# Patient Record
Sex: FEMALE | Race: BLACK OR AFRICAN AMERICAN | NOT HISPANIC OR LATINO | Employment: OTHER | URBAN - METROPOLITAN AREA
[De-identification: names, ages, dates, MRNs, and addresses within clinical notes are randomized per-mention and may not be internally consistent; named-entity substitution may affect disease eponyms.]

---

## 2019-05-15 ENCOUNTER — MEDICAL CORRESPONDENCE (OUTPATIENT)
Dept: HEALTH INFORMATION MANAGEMENT | Facility: CLINIC | Age: 69
End: 2019-05-15

## 2019-05-20 ENCOUNTER — TRANSFERRED RECORDS (OUTPATIENT)
Dept: HEALTH INFORMATION MANAGEMENT | Facility: CLINIC | Age: 69
End: 2019-05-20

## 2019-06-11 NOTE — TELEPHONE ENCOUNTER
RECORDS RECEIVED FROM: Hep C, High viral load, appt per [facility]. records at [facility]   DATE RECEIVED: 06.12.2019   NOTES STATUS DETAILS   OFFICE NOTE from referring provider Received 06.11.2019   OFFICE NOTES from other specialists N/A    DISCHARGE SUMMARY from hospital N/A    MEDICATION LIST N/A    LIVER BIOSPY (IF APPLICABLE)      PATHOLOGY REPORTS  N/A    IMAGING     ENDOSCOPY (IF AVAILABLE) N/A    COLONOSCOPY (IF AVAILABLE) N/A    ULTRASOUND LIVER N/A    CT OF ABDOMEN N/A    MRI OF LIVER N/A    FIBROSCAN, US ELASTOGRAPHY, FIBROSIS SCAN, MR ELASTOGRAPHY N/A    LABS     HEPATIC PANEL (LIVER PANEL) N/A    BASIC METABOLIC PANEL N/A    COMPLETE METABOLIC PANEL N/A    COMPLETE BLOOD COUNT (CBC) Received 05.16.2019   INTERNATIONAL NORMALIZED RATIO (INR) N/A    HEPATITIS C ANTIBODY N/A    HEPATITIS C VIRAL LOAD/PCR N/A    HEPATITIS C GENOTYPE N/A    HEPATITIS B SURFACE ANTIGEN N/A    HEPATITIS B SURFACE ANTIBODY N/A    HEPATITIS B DNA QUANT LEVEL N/A    HEPATITIS B CORE ANTIBODY N/A

## 2019-06-12 ENCOUNTER — PRE VISIT (OUTPATIENT)
Dept: GASTROENTEROLOGY | Facility: CLINIC | Age: 69
End: 2019-06-12

## 2019-06-21 ENCOUNTER — APPOINTMENT (OUTPATIENT)
Dept: GENERAL RADIOLOGY | Facility: CLINIC | Age: 69
End: 2019-06-21
Attending: EMERGENCY MEDICINE
Payer: COMMERCIAL

## 2019-06-21 ENCOUNTER — HOSPITAL ENCOUNTER (EMERGENCY)
Facility: CLINIC | Age: 69
Discharge: HOME | End: 2019-06-21
Attending: EMERGENCY MEDICINE | Admitting: EMERGENCY MEDICINE
Payer: COMMERCIAL

## 2019-06-21 VITALS
DIASTOLIC BLOOD PRESSURE: 62 MMHG | OXYGEN SATURATION: 100 % | HEART RATE: 74 BPM | SYSTOLIC BLOOD PRESSURE: 130 MMHG | RESPIRATION RATE: 16 BRPM | TEMPERATURE: 98 F

## 2019-06-21 DIAGNOSIS — D64.9 ANEMIA, UNSPECIFIED TYPE: ICD-10-CM

## 2019-06-21 DIAGNOSIS — G89.29 OTHER CHRONIC PAIN: ICD-10-CM

## 2019-06-21 DIAGNOSIS — M19.90 OSTEOARTHRITIS, UNSPECIFIED OSTEOARTHRITIS TYPE, UNSPECIFIED SITE: ICD-10-CM

## 2019-06-21 LAB
ALBUMIN UR-MCNC: NEGATIVE MG/DL
ANION GAP SERPL CALCULATED.3IONS-SCNC: 7 MMOL/L (ref 3–14)
APPEARANCE UR: CLEAR
BASOPHILS # BLD AUTO: 0 10E9/L (ref 0–0.2)
BASOPHILS NFR BLD AUTO: 0 %
BILIRUB UR QL STRIP: NEGATIVE
BUN SERPL-MCNC: 22 MG/DL (ref 7–30)
CALCIUM SERPL-MCNC: 8.5 MG/DL (ref 8.5–10.1)
CHLORIDE SERPL-SCNC: 103 MMOL/L (ref 94–109)
CO2 SERPL-SCNC: 27 MMOL/L (ref 20–32)
COLOR UR AUTO: ABNORMAL
CREAT SERPL-MCNC: 0.86 MG/DL (ref 0.52–1.04)
DIFFERENTIAL METHOD BLD: ABNORMAL
EOSINOPHIL # BLD AUTO: 0 10E9/L (ref 0–0.7)
EOSINOPHIL NFR BLD AUTO: 0 %
ERYTHROCYTE [DISTWIDTH] IN BLOOD BY AUTOMATED COUNT: 13 % (ref 10–15)
GFR SERPL CREATININE-BSD FRML MDRD: 68 ML/MIN/{1.73_M2}
GLUCOSE SERPL-MCNC: 84 MG/DL (ref 70–99)
GLUCOSE UR STRIP-MCNC: NEGATIVE MG/DL
HCT VFR BLD AUTO: 32.1 % (ref 35–47)
HGB BLD-MCNC: 10.7 G/DL (ref 11.7–15.7)
HGB UR QL STRIP: NEGATIVE
IMM GRANULOCYTES # BLD: 0 10E9/L (ref 0–0.4)
IMM GRANULOCYTES NFR BLD: 0.4 %
KETONES UR STRIP-MCNC: NEGATIVE MG/DL
LEUKOCYTE ESTERASE UR QL STRIP: ABNORMAL
LYMPHOCYTES # BLD AUTO: 1.7 10E9/L (ref 0.8–5.3)
LYMPHOCYTES NFR BLD AUTO: 35.4 %
MCH RBC QN AUTO: 32.1 PG (ref 26.5–33)
MCHC RBC AUTO-ENTMCNC: 33.3 G/DL (ref 31.5–36.5)
MCV RBC AUTO: 96 FL (ref 78–100)
MONOCYTES # BLD AUTO: 0.4 10E9/L (ref 0–1.3)
MONOCYTES NFR BLD AUTO: 7.8 %
MUCOUS THREADS #/AREA URNS LPF: PRESENT /LPF
NEUTROPHILS # BLD AUTO: 2.7 10E9/L (ref 1.6–8.3)
NEUTROPHILS NFR BLD AUTO: 56.4 %
NITRATE UR QL: NEGATIVE
NRBC # BLD AUTO: 0 10*3/UL
NRBC BLD AUTO-RTO: 0 /100
PH UR STRIP: 6 PH (ref 5–7)
PLATELET # BLD AUTO: 128 10E9/L (ref 150–450)
POTASSIUM SERPL-SCNC: 4.7 MMOL/L (ref 3.4–5.3)
RBC # BLD AUTO: 3.33 10E12/L (ref 3.8–5.2)
RBC #/AREA URNS AUTO: 0 /HPF (ref 0–2)
SODIUM SERPL-SCNC: 137 MMOL/L (ref 133–144)
SOURCE: ABNORMAL
SP GR UR STRIP: 1 (ref 1–1.03)
SQUAMOUS #/AREA URNS AUTO: <1 /HPF (ref 0–1)
UROBILINOGEN UR STRIP-MCNC: NORMAL MG/DL (ref 0–2)
WBC # BLD AUTO: 4.8 10E9/L (ref 4–11)
WBC #/AREA URNS AUTO: 1 /HPF (ref 0–5)

## 2019-06-21 PROCEDURE — 25000132 ZZH RX MED GY IP 250 OP 250 PS 637: Performed by: EMERGENCY MEDICINE

## 2019-06-21 PROCEDURE — 72100 X-RAY EXAM L-S SPINE 2/3 VWS: CPT

## 2019-06-21 PROCEDURE — 72040 X-RAY EXAM NECK SPINE 2-3 VW: CPT

## 2019-06-21 PROCEDURE — 80048 BASIC METABOLIC PNL TOTAL CA: CPT | Performed by: EMERGENCY MEDICINE

## 2019-06-21 PROCEDURE — 81001 URINALYSIS AUTO W/SCOPE: CPT | Performed by: EMERGENCY MEDICINE

## 2019-06-21 PROCEDURE — 73502 X-RAY EXAM HIP UNI 2-3 VIEWS: CPT

## 2019-06-21 PROCEDURE — 73562 X-RAY EXAM OF KNEE 3: CPT | Mod: LT

## 2019-06-21 PROCEDURE — 85025 COMPLETE CBC W/AUTO DIFF WBC: CPT | Performed by: EMERGENCY MEDICINE

## 2019-06-21 PROCEDURE — 99283 EMERGENCY DEPT VISIT LOW MDM: CPT | Mod: Z6 | Performed by: EMERGENCY MEDICINE

## 2019-06-21 PROCEDURE — 99284 EMERGENCY DEPT VISIT MOD MDM: CPT | Performed by: EMERGENCY MEDICINE

## 2019-06-21 RX ORDER — IBUPROFEN 600 MG/1
600 TABLET, FILM COATED ORAL ONCE
Status: COMPLETED | OUTPATIENT
Start: 2019-06-21 | End: 2019-06-21

## 2019-06-21 RX ORDER — IBUPROFEN 200 MG
400 TABLET ORAL EVERY 8 HOURS PRN
Qty: 20 TABLET | Refills: 0 | Status: SHIPPED | OUTPATIENT
Start: 2019-06-21 | End: 2019-07-11

## 2019-06-21 RX ORDER — ACETAMINOPHEN 500 MG
500 TABLET ORAL EVERY 6 HOURS PRN
Qty: 20 TABLET | Refills: 0 | Status: SHIPPED | OUTPATIENT
Start: 2019-06-21 | End: 2019-07-11

## 2019-06-21 RX ADMIN — IBUPROFEN 600 MG: 600 TABLET ORAL at 20:32

## 2019-06-21 SDOH — HEALTH STABILITY: MENTAL HEALTH: HOW OFTEN DO YOU HAVE A DRINK CONTAINING ALCOHOL?: NEVER

## 2019-06-21 ASSESSMENT — ENCOUNTER SYMPTOMS
NECK STIFFNESS: 0
NECK PAIN: 1
WEAKNESS: 0
DYSURIA: 0
NUMBNESS: 1
COUGH: 0
SHORTNESS OF BREATH: 0
BACK PAIN: 1
FACIAL ASYMMETRY: 0
VOMITING: 0
NAUSEA: 0
ARTHRALGIAS: 1
ABDOMINAL PAIN: 0
MYALGIAS: 1
HEADACHES: 0
FEVER: 1
SPEECH DIFFICULTY: 0
WOUND: 0

## 2019-06-21 NOTE — ED NOTES
Patient reports pain throughout body for past couple of months. Was seen in doctors office June 5, says they didn't tell her what was wrong or give her anything to help. Patient voided just prior to coming into ED, asked for urine sample when she is able to provide.

## 2019-06-21 NOTE — ED NOTES
Patient presents to ED with c/o right sided pain and states symptoms started 3 months ago and has gotten worst; patient c/o right toes numbness; refused  services and requested family to serve as ; resting in bed.

## 2019-06-21 NOTE — ED AVS SNAPSHOT
[facility], [place], Emergency Department  [address]  [place] [address]  Phone:  [phone]  Fax:  [phone]                                    Junaid Gunter   MRN: [id number]    Department:  [facility], Emergency Department   Date of Visit:  6/21/2019           After Visit Summary Signature Page    I have received my discharge instructions, and my questions have been answered. I have discussed any challenges I see with this plan with the nurse or doctor.    ..........................................................................................................................................  Patient/Patient Representative Signature      ..........................................................................................................................................  Patient Representative Print Name and Relationship to Patient    ..................................................               ................................................  Date                                   Time    ..........................................................................................................................................  Reviewed by Signature/Title    ...................................................              ..............................................  Date                                               Time          22EPIC Rev 08/18

## 2019-06-22 NOTE — ED PROVIDER NOTES
History     Chief Complaint   Patient presents with     Body pain     Left sided body pain for about three months, getting worse.     Urgency     and burning with urination.     The history is provided by the patient. The history is limited by a language barrier.  used: patient's neighbor acts as Singaporean .     Junaid Gunter is a 69 year old [place]an female with a history of hypertension who presents to the Emergency Department accompanied by her neighbor for evaluation of left-sided pain ongoing for the past three months. Patient reports that her pain initially began at her left hip and has since progressed to include her entire left side from the neck below. Patient reports that her pain has been constant since onset though waxing and waning in severity. Patient describes this as musculoskeletal in nature with no associated injuries or trauma. She notes that her pain is exacerbated with movement. She does use a cane in order to ambulate and notes that she walks with a limp and bears more weight on her right side. Patient states that she has a burning sensation of her right foot on the plantar aspect with associated paresthesia. Additionally, patient reports having subjective fevers since onset and states that this is increasing. She denies any headaches but does endorse neck pain. She denies any vision changes or eye pain, though she does note having some blurred vision. She denies any abdominal pain, chest pain, shortness of breath, nausea or vomiting.    I have reviewed the Medications, Allergies, Past Medical and Surgical History, and Social History in the [facility] system.    Past Medical History:   Diagnosis Date     Hypertension        History reviewed. No pertinent surgical history.    History reviewed. No pertinent family history.    Social History     Tobacco Use     Smoking status: Never Smoker     Smokeless tobacco: Never Used   Substance Use Topics     Alcohol use: Never      Frequency: Never     No current facility-administered medications for this encounter.      Current Outpatient Medications   Medication     acetaminophen (TYLENOL) 500 MG tablet     ibuprofen (ADVIL/MOTRIN) 200 MG tablet        Allergies   Allergen Reactions     Egg [Chicken-Derived Products (Egg)] Itching       Review of Systems   Constitutional: Positive for fever (subjective).   Eyes: Negative for visual disturbance.   Respiratory: Negative for cough and shortness of breath.    Cardiovascular: Negative for chest pain and leg swelling.   Gastrointestinal: Negative for abdominal pain, nausea and vomiting.   Genitourinary: Negative for dysuria.   Musculoskeletal: Positive for arthralgias, back pain, gait problem (limps), myalgias (left-sided from the neck below) and neck pain (left-sided). Negative for neck stiffness.   Skin: Negative for rash and wound.   Neurological: Positive for numbness (right toes). Negative for facial asymmetry, speech difficulty, weakness and headaches.   All other systems reviewed and are negative.      Physical Exam   BP: 148/60  Pulse: 74  Heart Rate: 74  Temp: 96.4  F (35.8  C)  Resp: 16  SpO2: 97 %      Physical Exam   Constitutional: She is oriented to person, place, and time. She appears well-developed and well-nourished. No distress.   HENT:   Head: Normocephalic and atraumatic.   Nose: Nose normal.   Mouth/Throat: Oropharynx is clear and moist.   Eyes: Pupils are equal, round, and reactive to light. Conjunctivae and EOM are normal. No scleral icterus.   Neck: Trachea normal, normal range of motion, full passive range of motion without pain and phonation normal. Neck supple. Muscular tenderness present. No spinous process tenderness present. No neck rigidity. No edema, no erythema and normal range of motion present. No thyroid mass present.       Cardiovascular: Normal rate, regular rhythm, normal heart sounds and intact distal pulses.   No murmur heard.  Pulmonary/Chest: Effort  normal and breath sounds normal. No stridor. No respiratory distress. She has no wheezes. She has no rales. She exhibits no tenderness.   Abdominal: Soft. Bowel sounds are normal. She exhibits no distension and no mass. There is no tenderness. There is no rebound and no guarding.   Musculoskeletal: She exhibits tenderness. She exhibits no edema or deformity.        Left shoulder: Normal.        Left elbow: Normal.        Left hip: She exhibits tenderness. She exhibits normal range of motion, no bony tenderness, no swelling, no crepitus and no deformity.        Cervical back: Normal.        Thoracic back: Normal.        Lumbar back: She exhibits tenderness and pain. She exhibits normal range of motion, no bony tenderness, no swelling, no edema and no deformity.        Back:         Right upper arm: Normal.        Left upper leg: She exhibits tenderness. She exhibits no bony tenderness, no edema and no deformity.        Left lower leg: Normal.        Legs:       Right foot: Normal. There is normal range of motion, no tenderness, no bony tenderness, no swelling and no deformity.        Left foot: Normal.   Neurological: She is alert and oriented to person, place, and time. She has normal strength. She displays no tremor. No cranial nerve deficit or sensory deficit. She exhibits normal muscle tone. Gait abnormal. Coordination normal. GCS eye subscore is 4. GCS verbal subscore is 5. GCS motor subscore is 6.   Gait is slightly antalgic with subtle tilt to right side   Skin: Skin is warm and dry. No rash noted. She is not diaphoretic. No erythema.   Psychiatric: She has a normal mood and affect. Her behavior is normal. Thought content normal.   Nursing note and vitals reviewed.      ED Course        Procedures             Critical Care time:  none             Labs Ordered and Resulted from Time of ED Arrival Up to the Time of Departure from the ED   CBC WITH PLATELETS DIFFERENTIAL - Abnormal; Notable for the following  components:       Result Value    RBC Count 3.33 (*)     Hemoglobin 10.7 (*)     Hematocrit 32.1 (*)     Platelet Count 128 (*)     All other components within normal limits   ROUTINE UA WITH MICROSCOPIC REFLEX TO CULTURE - Abnormal; Notable for the following components:    Leukocyte Esterase Urine Small (*)     Mucous Urine Present (*)     All other components within normal limits   BASIC METABOLIC PANEL           XR Pelvis and Hip Left 2 Views   Final Result   IMPRESSION: AP view of pelvis. Pelvic ring is intact without fracture.   Hips are located without evidence of fracture. SI joints appear   symmetric. Lateral view left hip is unremarkable. Multiple injection   calcifications are noted in the right and left buttock region.       CADY AGARWAL MD      XR Lumbar Spine 2/3 Views   Final Result   IMPRESSION:  Two views of the lumbar spine are performed. There is   grade 2 spondylolisthesis of L5 on S1 probably related to significant   degenerative facet changes at that level. Lumbar spine is otherwise   normal in alignment. No fracture is appreciated. Aortic calcification   is noted. No obvious evidence of aneurysm.       CADY AGARWAL MD      XR Knee Left 3 Views   Final Result   IMPRESSION: Three views of the left knee are performed. No fracture or   dislocation is evident. Three compartment degenerative changes are   present most significant in the medial joint compartment with medial   joint line osteophytes and joint space narrowing. Mild joint space   narrowing patellofemoral and lateral joint compartment are also noted   without significant osteophytes. No suprapatellar effusion is evident.         CADY AGARWAL MD      XR Cervical Spine 2/3 Views   Final Result   IMPRESSION:  Three views of the cervical spine are performed. No   evidence of fracture or malalignment. Degenerative disc changes are   noted at C3-4, C4-5 and C5-6. Degenerative facet changes are also   noted at these levels. Odontoid view is  unremarkable.       CADY AGARWAL MD            Assessments & Plan (with Medical Decision Making)   Junaid Gunter is a 69 year old female with a history of hypertension who presents to the Emergency Department accompanied by her neighbor for evaluation of left-sided pain ongoing for the past three months.    Ddx: myalgias, arthralgias, DJD/OA of left hip or knee, antalgic gait    Patient NAD with diffuse muscle tenderness on left upper and lower body. No bony deformity. No strength or sensation deficits on exam. Walks with slightly antalgic gait, leaning to right side a bit. No ataxia or weakness. Suspect patient has DJD of left hip or knee causing gait abnormality with myalgias from asymmetric gait. Would likely benefit from PT and NSAIDs for pain. Will obtain screening labs: CBC, BMP, UA. Xrays of C and L spine, pelvis, left hip, left knee. No e/o stroke with absence of objective neuro deficits on exam. No joint swelling or warmth to suggest septic or inflammatory arthropathy. Patient walks with cane at baseline. Given motrin for pain.     Pain resolved with motrin. Xrays, as above, with diffuse DJD. Discussed f/u with PCP for referral to PCP and w/u of anemia. Prescribed tylenol and motrin for chronic MSK pain. Return precautions provided.       I have reviewed the nursing notes.    I have reviewed the findings, diagnosis, plan and need for follow up with the patient.       Medication List      Started    acetaminophen 500 MG tablet  Commonly known as:  TYLENOL  500 mg, Oral, EVERY 6 HOURS PRN     ibuprofen 200 MG tablet  Commonly known as:  ADVIL/MOTRIN  400 mg, Oral, EVERY 8 HOURS PRN            Final diagnoses:   Osteoarthritis, unspecified osteoarthritis type, unspecified site   Anemia, unspecified type   Other chronic pain     Sonya BRICENO, am serving as a trained medical scribe to document services personally performed by Dominique Benton MD, based on the provider's statements to me.   IDominique MD,  was physically present and have reviewed and verified the accuracy of this note documented by Sonya Gunter.    6/21/2019   [facility], [place], EMERGENCY DEPARTMENT     Dominique Benton MD  06/22/19 0132

## 2019-08-30 ENCOUNTER — TELEPHONE (OUTPATIENT)
Dept: GASTROENTEROLOGY | Facility: CLINIC | Age: 69
End: 2019-08-30

## 2019-09-03 ENCOUNTER — OFFICE VISIT (OUTPATIENT)
Dept: GASTROENTEROLOGY | Facility: CLINIC | Age: 69
End: 2019-09-03
Attending: INTERNAL MEDICINE
Payer: COMMERCIAL

## 2019-09-03 VITALS
HEART RATE: 73 BPM | OXYGEN SATURATION: 98 % | DIASTOLIC BLOOD PRESSURE: 72 MMHG | TEMPERATURE: 97.8 F | WEIGHT: 158.7 LBS | SYSTOLIC BLOOD PRESSURE: 126 MMHG

## 2019-09-03 DIAGNOSIS — B18.2 CHRONIC HEPATITIS C WITHOUT HEPATIC COMA (H): Primary | ICD-10-CM

## 2019-09-03 DIAGNOSIS — B18.2 CHRONIC HEPATITIS C WITHOUT HEPATIC COMA (H): ICD-10-CM

## 2019-09-03 LAB
ALBUMIN SERPL-MCNC: 3.6 G/DL (ref 3.4–5)
ALP SERPL-CCNC: 85 U/L (ref 40–150)
ALT SERPL W P-5'-P-CCNC: 46 U/L (ref 0–50)
ANION GAP SERPL CALCULATED.3IONS-SCNC: 5 MMOL/L (ref 3–14)
AST SERPL W P-5'-P-CCNC: 44 U/L (ref 0–45)
BILIRUB SERPL-MCNC: 0.3 MG/DL (ref 0.2–1.3)
BUN SERPL-MCNC: 27 MG/DL (ref 7–30)
CALCIUM SERPL-MCNC: 9.2 MG/DL (ref 8.5–10.1)
CHLORIDE SERPL-SCNC: 99 MMOL/L (ref 94–109)
CO2 SERPL-SCNC: 28 MMOL/L (ref 20–32)
CREAT SERPL-MCNC: 0.88 MG/DL (ref 0.52–1.04)
ERYTHROCYTE [DISTWIDTH] IN BLOOD BY AUTOMATED COUNT: 12.9 % (ref 10–15)
GFR SERPL CREATININE-BSD FRML MDRD: 67 ML/MIN/{1.73_M2}
GLUCOSE SERPL-MCNC: 71 MG/DL (ref 70–99)
HBV SURFACE AB SERPL IA-ACNC: 0.88 M[IU]/ML
HBV SURFACE AG SERPL QL IA: NONREACTIVE
HCT VFR BLD AUTO: 31.8 % (ref 35–47)
HCV AB SERPL QL IA: REACTIVE
HCV AB SERPL QL IA: REACTIVE
HGB BLD-MCNC: 10.3 G/DL (ref 11.7–15.7)
MCH RBC QN AUTO: 32.3 PG (ref 26.5–33)
MCHC RBC AUTO-ENTMCNC: 32.4 G/DL (ref 31.5–36.5)
MCV RBC AUTO: 100 FL (ref 78–100)
PLATELET # BLD AUTO: 109 10E9/L (ref 150–450)
POTASSIUM SERPL-SCNC: 3.7 MMOL/L (ref 3.4–5.3)
PROT SERPL-MCNC: 8.1 G/DL (ref 6.8–8.8)
RBC # BLD AUTO: 3.19 10E12/L (ref 3.8–5.2)
SODIUM SERPL-SCNC: 132 MMOL/L (ref 133–144)
WBC # BLD AUTO: 5.2 10E9/L (ref 4–11)

## 2019-09-03 PROCEDURE — 87389 HIV-1 AG W/HIV-1&-2 AB AG IA: CPT | Performed by: INTERNAL MEDICINE

## 2019-09-03 PROCEDURE — 87340 HEPATITIS B SURFACE AG IA: CPT | Performed by: INTERNAL MEDICINE

## 2019-09-03 PROCEDURE — 36415 COLL VENOUS BLD VENIPUNCTURE: CPT | Performed by: INTERNAL MEDICINE

## 2019-09-03 PROCEDURE — 87522 HEPATITIS C REVRS TRNSCRPJ: CPT | Performed by: INTERNAL MEDICINE

## 2019-09-03 PROCEDURE — 86706 HEP B SURFACE ANTIBODY: CPT | Performed by: INTERNAL MEDICINE

## 2019-09-03 PROCEDURE — 80053 COMPREHEN METABOLIC PANEL: CPT | Performed by: INTERNAL MEDICINE

## 2019-09-03 PROCEDURE — 85027 COMPLETE CBC AUTOMATED: CPT | Performed by: INTERNAL MEDICINE

## 2019-09-03 PROCEDURE — G0463 HOSPITAL OUTPT CLINIC VISIT: HCPCS | Mod: ZF

## 2019-09-03 PROCEDURE — 86705 HEP B CORE ANTIBODY IGM: CPT | Performed by: INTERNAL MEDICINE

## 2019-09-03 PROCEDURE — 86803 HEPATITIS C AB TEST: CPT | Performed by: INTERNAL MEDICINE

## 2019-09-03 PROCEDURE — 91200 LIVER ELASTOGRAPHY: CPT | Mod: ZF

## 2019-09-03 PROCEDURE — 86704 HEP B CORE ANTIBODY TOTAL: CPT | Performed by: INTERNAL MEDICINE

## 2019-09-03 PROCEDURE — 87902 NFCT AGT GNTYP ALYS HEP C: CPT | Performed by: INTERNAL MEDICINE

## 2019-09-03 ASSESSMENT — PAIN SCALES - GENERAL: PAINLEVEL: MODERATE PAIN (5)

## 2019-09-03 NOTE — NURSING NOTE
Chief Complaint   Patient presents with     Consult     new; HEP C     /72 (BP Location: Right arm, Patient Position: Sitting, Cuff Size: Adult Regular)   Pulse 73   Temp 97.8  F (36.6  C)   Wt 72 kg (158 lb 11.2 oz)   SpO2 98%       Jefry Eduardo, EMT

## 2019-09-03 NOTE — LETTER
September 5, 2019       TO: Junaid KOLB Darrylmaura  [address]  [place] [address]       Dear Ms. Jani,    We are writing to inform you of your test results.  Your hepatitis C is positive. Will talk to you about management in your next visit.     Resulted Orders   Hepatitis C Screen Reflex to HCV RNA Quant and Genotype   Result Value Ref Range    Hepatitis C Antibody Reactive (AA) NR^Nonreactive      Comment:      A reactive result indicates one of the following 1) current HCV infection 2)   past HCV infection that has resolved or 3) false positivity. The CDC   recommends that a reactive result should be followed by Nucleic acid testing   for HCV RNA.   If HCV RNA is detected, that indicates current HCV infection. If HCV RNA is   not detected, that indicates either past, resolved HCV infection, or false HCV   antibody positivity.  Assay performance characteristics have not been established for newborns,   infants, and children             It was a pleasure to see you at your recent visit. Please let me know if you have any questions or concerns.     Clinic Staff - [phone] option 3     Sincerely,     Jani Nino MD  [address] [facility], [id number]  [place]  [address].

## 2019-09-03 NOTE — PROGRESS NOTES
[facility]    Hepatology New Patient Visit    Referring provider:  Todd Han    CHIEF COMPLAINT AND REASON FOR VISIT:  Ms. Gunter is a 69-year-old [place] immigrant who carries a diagnosis of hepatitis C virus infection.  Although patient claims that I have seen her in the past,  I do not have the details of her diagnosis.        She tells me now that she never had jaundice, does not have any signs of chronic liver disease, abdominal distension, lower extremity edema, lethargy or confusion. No history of melena, hematemesis or hematochezia. Her weight has not changed a lot and her appetite is good.  Denies also any abdominal pain, nausea or vomiting.  She is having regular bowel movements also.  She is physically active, although she is limited by the degenerative joint disease.  Otherwise, her only 2 medications are for pain and these are ibuprofen and Tylenol.     Medical hx Surgical hx   Past Medical History:   Diagnosis Date     DJD (degenerative joint disease)      HCV (hepatitis C virus)      Hypertension      Osteoporosis       No past surgical history on file.       Medications  Prior to Admission medications    Medication Sig Start Date End Date Taking? Authorizing Provider   acetaminophen (TYLENOL) 500 MG tablet Take 1 tablet (500 mg) by mouth every 6 hours as needed for pain 6/21/19 7/11/19  Dominique Benton MD   ibuprofen (ADVIL/MOTRIN) 200 MG tablet Take 2 tablets (400 mg) by mouth every 8 hours as needed (moderate or severe pain) 6/21/19 7/11/19  Dominique Benton MD       Allergies  Allergies   Allergen Reactions     Egg [Chicken-Derived Products (Egg)] Itching       Family hx Social hx   Family History   Problem Relation Age of Onset     Depression Sister       Social History     Tobacco Use     Smoking status: Never Smoker     Smokeless tobacco: Never Used   Substance Use Topics     Alcohol use: Never     Frequency: Never     Drug use: Never          REVIEW OF  SYSTEMS:  She denies any infections, admitted to some fatigue and occasional headaches which required at one point a hospital ER visit.  Denies any seizures.  Denies any cough, shortness of breath or chest pain.  She has some anemia, but no easy bruising.  Denies any diabetes or thyroid issues.  Does not have any psychiatric diagnosis.  She has problems with eye vision, but no hearing problems.  She has significant degenerative joint disease, but no other skin issues.  Otherwise, a comprehensive review of systems was noncontributory if not stated above.     Examination  /72 (BP Location: Right arm, Patient Position: Sitting, Cuff Size: Adult Regular)   Pulse 73   Temp 97.8  F (36.6  C)   Wt 72 kg (158 lb 11.2 oz)   SpO2 98%   There is no height or weight on file to calculate BMI.    Gen- well, NAD, A+Ox3, normal color  Eye- EOMI  ENT- MMM, normal oropharynx  Lym- no palpable lymphadenopathy  CVS- S1, S2 normal, no added sounds, RRR  RS- CTA  Abd- Obese> no hepatosplenomegaly.  Extr- pulses good, no KELLY  MS- hands normal- no clubbing  Neuro- A+Ox3, no asterixis  Skin- no rash or jaundice  Psych- normal mood    Laboratory  Lab Results   Component Value Date     09/03/2019    POTASSIUM 3.7 09/03/2019    CHLORIDE 99 09/03/2019    CO2 28 09/03/2019    BUN 27 09/03/2019    CR 0.88 09/03/2019       Lab Results   Component Value Date    BILITOTAL 0.3 09/03/2019    ALT 46 09/03/2019    AST 44 09/03/2019    ALKPHOS 85 09/03/2019       Lab Results   Component Value Date    ALBUMIN 3.6 09/03/2019    PROTTOTAL 8.1 09/03/2019        Lab Results   Component Value Date    WBC 5.2 09/03/2019    HGB 10.3 09/03/2019     09/03/2019     09/03/2019       No results found for: INR      Radiology    ASSESSMENT AND PLAN:  Hepatitis C virus infection.  Ms. Gunter comes to me with the diagnosis of hepatitis C virus infection.  She claims that I have seen her before, although I do not have the details of that  visit.  She has been in [place] for the last 5 years now, and she is just coming back.  The plan will be first of all to clarify if she has hepatitis C.  We will get a hepatitis C antibody with reflex to HCV RNA and genotype, and at the same time we will get markers for hepatitis B virus infection.  Please note that this is necessary in case the patient proves to have hepatitis C and requires hep C treatment.  There are cases of reactivation, and there is also a question if she had hepatitis B state of disease.  We will get that, and she already had this morning a fibrosis scan, which showed no fibrosis at all, as her mean liver stiffness was 5.6 kPa, which means F0, F1.  She will need an abdominal ultrasound, also, and that will be ordered after we know if she has hep B versus hep C.      This was a 45 minute visit, of which more than 50% was spent in explaining to the patient what our plan of care was.  We answered all of her questions.  She will have her healthcare maintenance issues with him (Dr. Han?), also, including need for a colonoscopy.      cc:   Todd Han MD   [address] [address]     Jani Nino MD  Hepatology  [facility]

## 2019-09-03 NOTE — LETTER
9/3/2019      RE: Junaid Gunter  [address]  [facility] [address]       [facility]    Hepatology New Patient Visit    Referring provider:  Todd Han    CHIEF COMPLAINT AND REASON FOR VISIT:  Ms. Gunter is a 69-year-old Pakistani immigrant who carries a diagnosis of hepatitis C virus infection.  Although patient claims that I have seen her in the past,  I do not have the details of her diagnosis.        She tells me now that she never had jaundice, does not have any signs of chronic liver disease, abdominal distension, lower extremity edema, lethargy or confusion. No history of melena, hematemesis or hematochezia. Her weight has not changed a lot and her appetite is good.  Denies also any abdominal pain, nausea or vomiting.  She is having regular bowel movements also.  She is physically active, although she is limited by the degenerative joint disease.  Otherwise, her only 2 medications are for pain and these are ibuprofen and Tylenol.     Medical hx Surgical hx   Past Medical History:   Diagnosis Date     DJD (degenerative joint disease)      HCV (hepatitis C virus)      Hypertension      Osteoporosis       No past surgical history on file.       Medications  Prior to Admission medications    Medication Sig Start Date End Date Taking? Authorizing Provider   acetaminophen (TYLENOL) 500 MG tablet Take 1 tablet (500 mg) by mouth every 6 hours as needed for pain 6/21/19 7/11/19  Dominique Benton MD   ibuprofen (ADVIL/MOTRIN) 200 MG tablet Take 2 tablets (400 mg) by mouth every 8 hours as needed (moderate or severe pain) 6/21/19 7/11/19  Dominique Benton MD       Allergies  Allergies   Allergen Reactions     Egg [Chicken-Derived Products (Egg)] Itching       Family hx Social hx   Family History   Problem Relation Age of Onset     Depression Sister       Social History     Tobacco Use     Smoking status: Never Smoker     Smokeless tobacco: Never Used   Substance Use Topics      Alcohol use: Never     Frequency: Never     Drug use: Never          REVIEW OF SYSTEMS:  She denies any infections, admitted to some fatigue and occasional headaches which required at one point a hospital ER visit.  Denies any seizures.  Denies any cough, shortness of breath or chest pain.  She has some anemia, but no easy bruising.  Denies any diabetes or thyroid issues.  Does not have any psychiatric diagnosis.  She has problems with eye vision, but no hearing problems.  She has significant degenerative joint disease, but no other skin issues.  Otherwise, a comprehensive review of systems was noncontributory if not stated above.     Examination  /72 (BP Location: Right arm, Patient Position: Sitting, Cuff Size: Adult Regular)   Pulse 73   Temp 97.8  F (36.6  C)   Wt 72 kg (158 lb 11.2 oz)   SpO2 98%   There is no height or weight on file to calculate BMI.    Gen- well, NAD, A+Ox3, normal color  Eye- EOMI  ENT- MMM, normal oropharynx  Lym- no palpable lymphadenopathy  CVS- S1, S2 normal, no added sounds, RRR  RS- CTA  Abd- Obese> no hepatosplenomegaly.  Extr- pulses good, no KELLY  MS- hands normal- no clubbing  Neuro- A+Ox3, no asterixis  Skin- no rash or jaundice  Psych- normal mood    Laboratory  Lab Results   Component Value Date     09/03/2019    POTASSIUM 3.7 09/03/2019    CHLORIDE 99 09/03/2019    CO2 28 09/03/2019    BUN 27 09/03/2019    CR 0.88 09/03/2019       Lab Results   Component Value Date    BILITOTAL 0.3 09/03/2019    ALT 46 09/03/2019    AST 44 09/03/2019    ALKPHOS 85 09/03/2019       Lab Results   Component Value Date    ALBUMIN 3.6 09/03/2019    PROTTOTAL 8.1 09/03/2019        Lab Results   Component Value Date    WBC 5.2 09/03/2019    HGB 10.3 09/03/2019     09/03/2019     09/03/2019       No results found for: INR      Radiology    ASSESSMENT AND PLAN:  Hepatitis C virus infection.  Ms. Gunter comes to me with the diagnosis of hepatitis C virus infection.  She  claims that I have seen her before, although I do not have the details of that visit.  She has been in [place] for the last 5 years now, and she is just coming back.  The plan will be first of all to clarify if she has hepatitis C.  We will get a hepatitis C antibody with reflex to HCV RNA and genotype, and at the same time we will get markers for hepatitis B virus infection.  Please note that this is necessary in case the patient proves to have hepatitis C and requires hep C treatment.  There are cases of reactivation, and there is also a question if she had hepatitis B state of disease.  We will get that, and she already had this morning a fibrosis scan, which showed no fibrosis at all, as her mean liver stiffness was 5.6 kPa, which means F0, F1.  She will need an abdominal ultrasound, also, and that will be ordered after we know if she has hep B versus hep C.      This was a 45 minute visit, of which more than 50% was spent in explaining to the patient what our plan of care was.  We answered all of her questions.  She will have her healthcare maintenance issues with him (Dr. Han?), also, including need for a colonoscopy.     Jani Nino MD    cc:   Todd Han MD   [address] [address]     Jani Nino MD  Hepatology  [facility]

## 2019-09-03 NOTE — LETTER
9/3/2019       RE: uJnaid Gunter  [address]  [facility] [address]     Dear Colleague,    Thank you for referring your patient, Junaid Gunter, to the [facility] HEPATOLOGY at [facility]. Please see a copy of my visit note below.    [facility]    Hepatology New Patient Visit    Referring provider:  Todd Han    CHIEF COMPLAINT AND REASON FOR VISIT:  Ms. Gunter is a 69-year-old [place] immigrant who carries a diagnosis of hepatitis C virus infection.  Although patient claims that I have seen her in the past,  I do not have the details of her diagnosis.        She tells me now that she never had jaundice, does not have any signs of chronic liver disease, abdominal distension, lower extremity edema, lethargy or confusion. No history of melena, hematemesis or hematochezia. Her weight has not changed a lot and her appetite is good.  Denies also any abdominal pain, nausea or vomiting.  She is having regular bowel movements also.  She is physically active, although she is limited by the degenerative joint disease.  Otherwise, her only 2 medications are for pain and these are ibuprofen and Tylenol.     Medical hx Surgical hx   Past Medical History:   Diagnosis Date     DJD (degenerative joint disease)      HCV (hepatitis C virus)      Hypertension      Osteoporosis       No past surgical history on file.       Medications  Prior to Admission medications    Medication Sig Start Date End Date Taking? Authorizing Provider   acetaminophen (TYLENOL) 500 MG tablet Take 1 tablet (500 mg) by mouth every 6 hours as needed for pain 6/21/19 7/11/19  Dominique Benton MD   ibuprofen (ADVIL/MOTRIN) 200 MG tablet Take 2 tablets (400 mg) by mouth every 8 hours as needed (moderate or severe pain) 6/21/19 7/11/19  Dominique Benton MD       Allergies  Allergies   Allergen Reactions     Egg [Chicken-Derived Products (Egg)] Itching       Family hx Social hx    Family History   Problem Relation Age of Onset     Depression Sister       Social History     Tobacco Use     Smoking status: Never Smoker     Smokeless tobacco: Never Used   Substance Use Topics     Alcohol use: Never     Frequency: Never     Drug use: Never          REVIEW OF SYSTEMS:  She denies any infections, admitted to some fatigue and occasional headaches which required at one point a hospital ER visit.  Denies any seizures.  Denies any cough, shortness of breath or chest pain.  She has some anemia, but no easy bruising.  Denies any diabetes or thyroid issues.  Does not have any psychiatric diagnosis.  She has problems with eye vision, but no hearing problems.  She has significant degenerative joint disease, but no other skin issues.  Otherwise, a comprehensive review of systems was noncontributory if not stated above.     Examination  /72 (BP Location: Right arm, Patient Position: Sitting, Cuff Size: Adult Regular)   Pulse 73   Temp 97.8  F (36.6  C)   Wt 72 kg (158 lb 11.2 oz)   SpO2 98%   There is no height or weight on file to calculate BMI.    Gen- well, NAD, A+Ox3, normal color  Eye- EOMI  ENT- MMM, normal oropharynx  Lym- no palpable lymphadenopathy  CVS- S1, S2 normal, no added sounds, RRR  RS- CTA  Abd- Obese> no hepatosplenomegaly.  Extr- pulses good, no KELLY  MS- hands normal- no clubbing  Neuro- A+Ox3, no asterixis  Skin- no rash or jaundice  Psych- normal mood    Laboratory  Lab Results   Component Value Date     09/03/2019    POTASSIUM 3.7 09/03/2019    CHLORIDE 99 09/03/2019    CO2 28 09/03/2019    BUN 27 09/03/2019    CR 0.88 09/03/2019       Lab Results   Component Value Date    BILITOTAL 0.3 09/03/2019    ALT 46 09/03/2019    AST 44 09/03/2019    ALKPHOS 85 09/03/2019       Lab Results   Component Value Date    ALBUMIN 3.6 09/03/2019    PROTTOTAL 8.1 09/03/2019        Lab Results   Component Value Date    WBC 5.2 09/03/2019    HGB 10.3 09/03/2019     09/03/2019      09/03/2019       No results found for: INR      Radiology    ASSESSMENT AND PLAN:  Hepatitis C virus infection.  Ms. Gunter comes to me with the diagnosis of hepatitis C virus infection.  She claims that I have seen her before, although I do not have the details of that visit.  She has been in [place] for the last 5 years now, and she is just coming back.  The plan will be first of all to clarify if she has hepatitis C.  We will get a hepatitis C antibody with reflex to HCV RNA and genotype, and at the same time we will get markers for hepatitis B virus infection.  Please note that this is necessary in case the patient proves to have hepatitis C and requires hep C treatment.  There are cases of reactivation, and there is also a question if she had hepatitis B state of disease.  We will get that, and she already had this morning a fibrosis scan, which showed no fibrosis at all, as her mean liver stiffness was 5.6 kPa, which means F0, F1.  She will need an abdominal ultrasound, also, and that will be ordered after we know if she has hep B versus hep C.      This was a 45 minute visit, of which more than 50% was spent in explaining to the patient what our plan of care was.  We answered all of her questions.  She will have her healthcare maintenance issues with him (Dr. Han?), also, including need for a colonoscopy.        Again, thank you for allowing me to participate in the care of your patient.      Sincerely,    Jani Nino MD    cc:   Todd Han MD   [address] [address]

## 2019-09-05 DIAGNOSIS — B18.2 CHRONIC HEPATITIS C WITHOUT HEPATIC COMA (H): Primary | ICD-10-CM

## 2019-09-05 LAB
HBV CORE AB SERPL QL IA: REACTIVE
HBV CORE IGM SERPL QL IA: NONREACTIVE
HCV RNA SERPL NAA+PROBE-ACNC: ABNORMAL [IU]/ML
HCV RNA SERPL NAA+PROBE-LOG IU: 7.2 LOG IU/ML

## 2019-09-06 DIAGNOSIS — B18.2 CHRONIC HEPATITIS C WITHOUT HEPATIC COMA (H): ICD-10-CM

## 2019-09-06 LAB — HIV 1+2 AB+HIV1 P24 AG SERPL QL IA: NONREACTIVE

## 2019-09-09 ENCOUNTER — PATIENT OUTREACH (OUTPATIENT)
Dept: GASTROENTEROLOGY | Facility: CLINIC | Age: 69
End: 2019-09-09

## 2019-09-09 NOTE — PROGRESS NOTES
Attempted to reach patient to discuss plan of care and hepatitis C treatment with Mavyret x 8 weeks, no answer, message left requesting call back, number provided.

## 2019-09-10 LAB — HCV GENTYP SERPL NAA+PROBE: NORMAL

## 2019-10-18 NOTE — PROGRESS NOTES
2nd attempt made to connect with pt to discuss approval of Mavyret and delivery of medication, no answer, message left requesting call back, number provided. Also called pt's emergency contact, Abhermann (son), to request Abee give pt message to call writer. Spencer asked if writer had called [phone] which writer had. Spoke with Baron Lovelace, [facility] Advocate, who stated that he also has not been able to connect with pt. Will attempt to reach pt again in 2 weeks.

## 2019-11-07 NOTE — PROGRESS NOTES
[facility] unable to reach pt to arrange initial delivery of Mavyret. Third attempt made to reach patient, no answer, message left instructing pt to call the clinic, number provided.

## 2020-02-04 DIAGNOSIS — B18.2 CHRONIC HEPATITIS C WITHOUT HEPATIC COMA (H): Primary | ICD-10-CM

## 2020-02-18 ENCOUNTER — PRE VISIT (OUTPATIENT)
Dept: GASTROENTEROLOGY | Facility: CLINIC | Age: 70
End: 2020-02-18

## 2020-02-18 NOTE — PROGRESS NOTES
Was the patient contacted by phone and reminded of the upcoming visit? Yes via Indian      Was the patient instructed to bring a current list of all medications to the appointment or instructed to bring in all medication bottles? Yes, patient verbalized understanding    Was the patient instructed to arrive prior to the appointment time to have ordered labs drawn? Yes, patient verbalized understanding    Were the needed lab orders placed? Yes    Was lab appointment made 1 hour or more prior to visit? Yes    Patient instructed to arrive early for check-in    Elham Givens CMA [facility]  2/18/2020 4:12 PM

## 2020-02-19 ENCOUNTER — DOCUMENTATION ONLY (OUTPATIENT)
Dept: CARE COORDINATION | Facility: CLINIC | Age: 70
End: 2020-02-19

## 2020-03-10 ENCOUNTER — OFFICE VISIT (OUTPATIENT)
Dept: FAMILY MEDICINE | Facility: CLINIC | Age: 70
End: 2020-03-10
Payer: COMMERCIAL

## 2020-03-10 VITALS
HEIGHT: 59 IN | OXYGEN SATURATION: 100 % | WEIGHT: 163 LBS | HEART RATE: 87 BPM | DIASTOLIC BLOOD PRESSURE: 64 MMHG | BODY MASS INDEX: 32.86 KG/M2 | RESPIRATION RATE: 16 BRPM | SYSTOLIC BLOOD PRESSURE: 110 MMHG | TEMPERATURE: 98.4 F

## 2020-03-10 DIAGNOSIS — I10 ESSENTIAL HYPERTENSION: ICD-10-CM

## 2020-03-10 DIAGNOSIS — M54.2 NECK PAIN: ICD-10-CM

## 2020-03-10 DIAGNOSIS — R06.09 EXERTIONAL DYSPNEA: ICD-10-CM

## 2020-03-10 DIAGNOSIS — E03.9 HYPOTHYROIDISM, UNSPECIFIED TYPE: ICD-10-CM

## 2020-03-10 DIAGNOSIS — Z23 NEED FOR VACCINATION: ICD-10-CM

## 2020-03-10 DIAGNOSIS — M85.80 AGE-RELATED BONE LOSS: ICD-10-CM

## 2020-03-10 DIAGNOSIS — R52 PAIN OF LEFT SIDE OF BODY: ICD-10-CM

## 2020-03-10 DIAGNOSIS — Z13.6 ENCOUNTER FOR LIPID SCREENING FOR CARDIOVASCULAR DISEASE: ICD-10-CM

## 2020-03-10 DIAGNOSIS — I70.0 ATHEROSCLEROSIS OF AORTA (H): ICD-10-CM

## 2020-03-10 DIAGNOSIS — Z78.0 MENOPAUSE: ICD-10-CM

## 2020-03-10 DIAGNOSIS — K21.9 GASTROESOPHAGEAL REFLUX DISEASE, ESOPHAGITIS PRESENCE NOT SPECIFIED: ICD-10-CM

## 2020-03-10 DIAGNOSIS — Z12.11 SPECIAL SCREENING FOR MALIGNANT NEOPLASMS, COLON: ICD-10-CM

## 2020-03-10 DIAGNOSIS — D64.9 ANEMIA, UNSPECIFIED TYPE: ICD-10-CM

## 2020-03-10 DIAGNOSIS — G62.9 PERIPHERAL POLYNEUROPATHY: Primary | ICD-10-CM

## 2020-03-10 DIAGNOSIS — R53.83 FATIGUE, UNSPECIFIED TYPE: ICD-10-CM

## 2020-03-10 DIAGNOSIS — B18.2 CHRONIC HEPATITIS C WITHOUT HEPATIC COMA (H): ICD-10-CM

## 2020-03-10 DIAGNOSIS — D69.6 THROMBOCYTOPENIA (H): ICD-10-CM

## 2020-03-10 DIAGNOSIS — M50.30 DDD (DEGENERATIVE DISC DISEASE), CERVICAL: ICD-10-CM

## 2020-03-10 DIAGNOSIS — Z13.220 ENCOUNTER FOR LIPID SCREENING FOR CARDIOVASCULAR DISEASE: ICD-10-CM

## 2020-03-10 DIAGNOSIS — F32.5 MAJOR DEPRESSIVE DISORDER WITH SINGLE EPISODE, IN REMISSION (H): ICD-10-CM

## 2020-03-10 LAB
BASOPHILS # BLD AUTO: 0.1 10E9/L (ref 0–0.2)
BASOPHILS NFR BLD AUTO: 1 %
DIFFERENTIAL METHOD BLD: ABNORMAL
ERYTHROCYTE [DISTWIDTH] IN BLOOD BY AUTOMATED COUNT: 13.8 % (ref 10–15)
FOLATE SERPL-MCNC: 16.1 NG/ML
HBA1C MFR BLD: 5.1 % (ref 0–5.6)
HCT VFR BLD AUTO: 36 % (ref 35–47)
HGB BLD-MCNC: 11.3 G/DL (ref 11.7–15.7)
LYMPHOCYTES # BLD AUTO: 1.4 10E9/L (ref 0.8–5.3)
LYMPHOCYTES NFR BLD AUTO: 27 %
MCH RBC QN AUTO: 32.4 PG (ref 26.5–33)
MCHC RBC AUTO-ENTMCNC: 31.4 G/DL (ref 31.5–36.5)
MCV RBC AUTO: 103 FL (ref 78–100)
MONOCYTES # BLD AUTO: 0.2 10E9/L (ref 0–1.3)
MONOCYTES NFR BLD AUTO: 3 %
NEUTROPHILS # BLD AUTO: 3.5 10E9/L (ref 1.6–8.3)
NEUTROPHILS NFR BLD AUTO: 69 %
PLATELET # BLD AUTO: 134 10E9/L (ref 150–450)
PLATELET # BLD EST: ABNORMAL 10*3/UL
RBC # BLD AUTO: 3.49 10E12/L (ref 3.8–5.2)
RBC MORPH BLD: ABNORMAL
VIT B12 SERPL-MCNC: 662 PG/ML (ref 193–986)
WBC # BLD AUTO: 5.2 10E9/L (ref 4–11)

## 2020-03-10 PROCEDURE — 82306 VITAMIN D 25 HYDROXY: CPT | Performed by: INTERNAL MEDICINE

## 2020-03-10 PROCEDURE — 82746 ASSAY OF FOLIC ACID SERUM: CPT | Performed by: INTERNAL MEDICINE

## 2020-03-10 PROCEDURE — 36415 COLL VENOUS BLD VENIPUNCTURE: CPT | Performed by: INTERNAL MEDICINE

## 2020-03-10 PROCEDURE — 80050 GENERAL HEALTH PANEL: CPT | Performed by: INTERNAL MEDICINE

## 2020-03-10 PROCEDURE — 84439 ASSAY OF FREE THYROXINE: CPT | Performed by: INTERNAL MEDICINE

## 2020-03-10 PROCEDURE — 99205 OFFICE O/P NEW HI 60 MIN: CPT | Mod: 25 | Performed by: INTERNAL MEDICINE

## 2020-03-10 PROCEDURE — 82607 VITAMIN B-12: CPT | Performed by: INTERNAL MEDICINE

## 2020-03-10 PROCEDURE — 83036 HEMOGLOBIN GLYCOSYLATED A1C: CPT | Performed by: INTERNAL MEDICINE

## 2020-03-10 PROCEDURE — 80061 LIPID PANEL: CPT | Performed by: INTERNAL MEDICINE

## 2020-03-10 PROCEDURE — 90715 TDAP VACCINE 7 YRS/> IM: CPT | Performed by: INTERNAL MEDICINE

## 2020-03-10 PROCEDURE — 90471 IMMUNIZATION ADMIN: CPT | Performed by: INTERNAL MEDICINE

## 2020-03-10 PROCEDURE — 83540 ASSAY OF IRON: CPT | Performed by: INTERNAL MEDICINE

## 2020-03-10 PROCEDURE — 83550 IRON BINDING TEST: CPT | Performed by: INTERNAL MEDICINE

## 2020-03-10 PROCEDURE — 82728 ASSAY OF FERRITIN: CPT | Performed by: INTERNAL MEDICINE

## 2020-03-10 RX ORDER — FERROUS GLUCONATE 324(37.5)
TABLET ORAL
Status: ON HOLD | COMMUNITY
Start: 2019-12-23 | End: 2020-06-24

## 2020-03-10 RX ORDER — PAROXETINE 10 MG/1
TABLET, FILM COATED ORAL
COMMUNITY
Start: 2020-02-05 | End: 2020-03-10

## 2020-03-10 RX ORDER — LEVOTHYROXINE SODIUM 25 UG/1
TABLET ORAL
COMMUNITY
Start: 2020-02-10 | End: 2020-03-11

## 2020-03-10 RX ORDER — PNEUMOCOCCAL 13-VALENT CONJUGATE VACCINE 2.2; 2.2; 2.2; 2.2; 2.2; 4.4; 2.2; 2.2; 2.2; 2.2; 2.2; 2.2; 2.2 UG/.5ML; UG/.5ML; UG/.5ML; UG/.5ML; UG/.5ML; UG/.5ML; UG/.5ML; UG/.5ML; UG/.5ML; UG/.5ML; UG/.5ML; UG/.5ML; UG/.5ML
INJECTION, SUSPENSION INTRAMUSCULAR
Status: ON HOLD | COMMUNITY
Start: 2019-12-31 | End: 2020-06-24

## 2020-03-10 RX ORDER — CHOLECALCIFEROL (VITAMIN D3) 50 MCG
1 TABLET ORAL DAILY
COMMUNITY
Start: 2020-02-05

## 2020-03-10 RX ORDER — ACETAMINOPHEN 160 MG
TABLET,DISINTEGRATING ORAL
Refills: 99 | COMMUNITY
Start: 2019-09-23 | End: 2020-03-10

## 2020-03-10 RX ORDER — FERROUS GLUCONATE 324(38)MG
TABLET ORAL
Status: ON HOLD | COMMUNITY
Start: 2020-02-05 | End: 2022-02-19

## 2020-03-10 RX ORDER — AMLODIPINE BESYLATE 5 MG/1
TABLET ORAL
COMMUNITY
Start: 2020-03-03 | End: 2020-03-10

## 2020-03-10 RX ORDER — SENNOSIDES 8.6 MG/1
TABLET, FILM COATED ORAL
Status: ON HOLD | COMMUNITY
Start: 2020-02-05 | End: 2022-02-19

## 2020-03-10 RX ORDER — PSEUDOEPHED/ACETAMINOPH/DIPHEN 30MG-500MG
500 TABLET ORAL EVERY 4 HOURS PRN
Status: ON HOLD | COMMUNITY
Start: 2020-02-05 | End: 2022-02-21

## 2020-03-10 RX ORDER — ALENDRONATE SODIUM 70 MG/1
TABLET ORAL
COMMUNITY
Start: 2020-02-05 | End: 2020-03-10

## 2020-03-10 RX ORDER — GABAPENTIN 300 MG/1
300 CAPSULE ORAL 2 TIMES DAILY
Qty: 60 CAPSULE | Refills: 1 | Status: SHIPPED | OUTPATIENT
Start: 2020-03-10

## 2020-03-10 RX ORDER — LOSARTAN POTASSIUM 100 MG/1
100 TABLET ORAL DAILY
COMMUNITY
Start: 2020-02-08

## 2020-03-10 RX ORDER — FAMOTIDINE 20 MG/1
20 TABLET, FILM COATED ORAL 2 TIMES DAILY
Qty: 60 TABLET | Refills: 1 | Status: ON HOLD | OUTPATIENT
Start: 2020-03-10 | End: 2020-06-24

## 2020-03-10 RX ORDER — HYDROCHLOROTHIAZIDE 12.5 MG/1
CAPSULE ORAL
COMMUNITY
Start: 2020-02-08 | End: 2020-03-10

## 2020-03-10 RX ORDER — INFLUENZA A VIRUS A/VICTORIA/4897/2022 IVR-238 (H1N1) ANTIGEN (FORMALDEHYDE INACTIVATED), INFLUENZA A VIRUS A/CALIFORNIA/122/2022 SAN-022 (H3N2) ANTIGEN (FORMALDEHYDE INACTIVATED), AND INFLUENZA B VIRUS B/MICHIGAN/01/2021 ANTIGEN (FORMALDEHYDE INACTIVATED) 60; 60; 60 UG/.5ML; UG/.5ML; UG/.5ML
INJECTION, SUSPENSION INTRAMUSCULAR
Status: ON HOLD | COMMUNITY
Start: 2019-12-31 | End: 2020-06-24

## 2020-03-10 ASSESSMENT — MIFFLIN-ST. JEOR: SCORE: 1169.75

## 2020-03-10 NOTE — ASSESSMENT & PLAN NOTE
This new problem identified on the patient recent ultrasound done for her liver electrogram and ultrasound renal showing atherosclerosis of aorta.  There is no lipid panel done anytime for this patient, not on aspirin.

## 2020-03-10 NOTE — ASSESSMENT & PLAN NOTE
This is a new problem which patient states is mostly causing her trouble in the numbness of the Rt foot which is causing her difficulty to sleep . Has associated burning sensation in her feet which is uncontrollable.

## 2020-03-10 NOTE — ASSESSMENT & PLAN NOTE
This new problem which patient states is mostly in the upper abdomen, feels burning sensation in the stomach.  Denies any nausea, vomiting or blood in the stools.

## 2020-03-10 NOTE — ASSESSMENT & PLAN NOTE
This is a chronic health problem that is uncontrolled with current medications and lifestyle measures.She has visited  hepatology who has done the required testing and pt supposed to take the medication Mavyret which she never started taking it as she does not know how to get it due to language barrier. Interpretor services will help her to get it done as per my todays discussion. Fibrosis elastogram was negative for Cirrhosis of liver F0 in 09/2019.

## 2020-03-10 NOTE — LETTER
March 11, 2020      Junaid Gunter  [address]    [facility] [address]        Dear ,    We are writing to inform you of your test results.    Your thyroid function is abnormal which is a cause of your depression, I do remember you telling me that the levothyroxine which was started for you caused swelling on your body and you have stopped taking it.  I am happy to give you an alternative called [place] [facility]roid and I hope your insurance covers this. Please start this medication ASAP. We will need to recheck your thyroid function in 5 weeks to make sure we adjust the dose of [place] Thyroid.     Your cholesterol levels are borderline high at 111, given your age and risk factors of hypertension and Calcification of Aorta I recommend to start on a low-dose of statin.  I have sent both medications to pharmacy and also placed a future order for your thyroid function in 5 weeks with lab appointment for you around April 16, 2020 to check if your thyroid function is normalized after you start off on the new thyroid medication tomorrow.     Please let me know if you have any questions.     Resulted Orders   CBC with platelets differential   Result Value Ref Range    WBC 5.2 4.0 - 11.0 10e9/L    RBC Count 3.49 (L) 3.8 - 5.2 10e12/L    Hemoglobin 11.3 (L) 11.7 - 15.7 g/dL    Hematocrit 36.0 35.0 - 47.0 %     (H) 78 - 100 fl    MCH 32.4 26.5 - 33.0 pg    MCHC 31.4 (L) 31.5 - 36.5 g/dL    RDW 13.8 10.0 - 15.0 %    Platelet Count 134 (L) 150 - 450 10e9/L    % Neutrophils 69.0 %    % Lymphocytes 27.0 %    % Monocytes 3.0 %    % Basophils 1.0 %    Absolute Neutrophil 3.5 1.6 - 8.3 10e9/L    Absolute Lymphocytes 1.4 0.8 - 5.3 10e9/L    Absolute Monocytes 0.2 0.0 - 1.3 10e9/L    Absolute Basophils 0.1 0.0 - 0.2 10e9/L    RBC Morphology Consistent with reported results     Platelet Estimate       Automated count confirmed.  Platelet morphology is normal.    Diff Method Manual Differential    Comprehensive  metabolic panel   Result Value Ref Range    Sodium 138 133 - 144 mmol/L    Potassium 3.9 3.4 - 5.3 mmol/L    Chloride 108 94 - 109 mmol/L    Carbon Dioxide 21 20 - 32 mmol/L    Anion Gap 9 3 - 14 mmol/L    Glucose 83 70 - 99 mg/dL    Urea Nitrogen 18 7 - 30 mg/dL    Creatinine 0.90 0.52 - 1.04 mg/dL    GFR Estimate 65 >60 mL/min/[1.73_m2]      Comment:      Non  GFR Calc  Starting 12/18/2018, serum creatinine based estimated GFR (eGFR) will be   calculated using the Chronic Kidney Disease Epidemiology Collaboration   (CKD-EPI) equation.      GFR Estimate If Black 75 >60 mL/min/[1.73_m2]      Comment:       GFR Calc  Starting 12/18/2018, serum creatinine based estimated GFR (eGFR) will be   calculated using the Chronic Kidney Disease Epidemiology Collaboration   (CKD-EPI) equation.      Calcium 8.8 8.5 - 10.1 mg/dL    Bilirubin Total 0.3 0.2 - 1.3 mg/dL    Albumin 3.3 (L) 3.4 - 5.0 g/dL    Protein Total 8.2 6.8 - 8.8 g/dL    Alkaline Phosphatase 94 40 - 150 U/L    ALT 25 0 - 50 U/L    AST 21 0 - 45 U/L   Hemoglobin A1c   Result Value Ref Range    Hemoglobin A1C 5.1 0 - 5.6 %      Comment:      Normal <5.7% Prediabetes 5.7-6.4%  Diabetes 6.5% or higher - adopted from ADA   consensus guidelines.     Lipid panel reflex to direct LDL Fasting   Result Value Ref Range    Cholesterol 199 <200 mg/dL    Triglycerides 92 <150 mg/dL    HDL Cholesterol 70 >49 mg/dL    LDL Cholesterol Calculated 111 (H) <100 mg/dL      Comment:      Above desirable:  100-129 mg/dl  Borderline High:  130-159 mg/dL  High:             160-189 mg/dL  Very high:       >189 mg/dl      Non HDL Cholesterol 129 <130 mg/dL   TSH with free T4 reflex   Result Value Ref Range    TSH 6.06 (H) 0.40 - 4.00 mU/L   Iron and iron binding capacity   Result Value Ref Range    Iron 92 35 - 180 ug/dL    Iron Binding Cap 318 240 - 430 ug/dL    Iron Saturation Index 29 15 - 46 %   Vitamin B12   Result Value Ref Range    Vitamin B12 662 193  - 986 pg/mL   Folate   Result Value Ref Range    Folate 16.1 >5.4 ng/mL   Ferritin   Result Value Ref Range    Ferritin 149 8 - 252 ng/mL   T4 free   Result Value Ref Range    T4 Free 1.05 0.76 - 1.46 ng/dL       If you have any questions or concerns, please call the clinic at the number listed above.       Sincerely,        Sherlyn Orozco MD

## 2020-03-10 NOTE — ASSESSMENT & PLAN NOTE
This is a chronic health problem that is uncontrolled with current medications and lifestyle measures.  Patient had her lab work done in September 2019 showing mildly worsening anemia with hemoglobin at 10.3, denies any active blood loss from stools.

## 2020-03-10 NOTE — ASSESSMENT & PLAN NOTE
This is a chronic health problem that is uncontrolled with current medications and lifestyle measures. Pt was supposed to be on medication LT 25 mcg but she took only for 8 days and stopped it last month as she feels that she got edema all over the body due to it.

## 2020-03-10 NOTE — ASSESSMENT & PLAN NOTE
This new problem identified and added to the problem list today after reviewing her recent x-rays in June 2019 done for her generalized left-sided body pains at the emergency room.  Cervical spine x-ray positive for degenerative disc disease between C5-C7.  Does have ongoing radiculopathy with left arm pain extending from the neck.

## 2020-03-10 NOTE — ASSESSMENT & PLAN NOTE
This is a chronic health problem that is well controlled with current medications and lifestyle measures. Pt was previously on Losartan/HCTZ and Amlodipine but currently only on Losartan 100 mg daily. B.P in the office today was 110/64 on Losartan 100mg daily.

## 2020-03-10 NOTE — ASSESSMENT & PLAN NOTE
This is a chronic health problem that is uncontrolled with current medications and lifestyle measures. Pt has visited ER with similar symptoms and has gotten the X rays on all the Left sided joints positive for Cervical spine DDD , DJD on the Knee , hip joints.

## 2020-03-10 NOTE — ASSESSMENT & PLAN NOTE
This is a chronic health problem that is well controlled with current medications and lifestyle measures. Pt supposed to be on Paroxetine which she stopped taking it after 1 day few weeks back. PHQ-2 was 0.

## 2020-03-10 NOTE — PROGRESS NOTES
Subjective     Junaid Gunter is a 70 year old female who presents to clinic today for the following health issues:    Patient has language barrier of and we had to use the  services for communicating with this patient.    HPI   Body Aches/Musculoskeletal Pain      Duration: ongoing     Description (location/character/radiation): lower back pain radiating down legs, neck pain radiating down back     Intensity:  Moderate, 6/10    Accompanying signs and symptoms: numbness and tingling in feet x1 year, difficulty turning head due to neck pain, problems with urination - urgency    History (similar episodes/previous evaluation): None    Precipitating or alleviating factors: None    Therapies tried and outcome: support wrap/brace, acetaminophen         Pain of left side of body  This is a chronic health problem that is uncontrolled with current medications and lifestyle measures. Pt has visited ER with similar symptoms and has gotten the X rays on all the Left sided joints positive for Cervical spine DDD , DJD on the Knee , hip joints.     Peripheral neuropathy  This is a new problem which patient states is mostly causing her trouble in the numbness of the Rt foot which is causing her difficulty to sleep . Has associated burning sensation in her feet which is uncontrollable.    Hepatitis C, chronic (H)  This is a chronic health problem that is uncontrolled with current medications and lifestyle measures.She has visited  hepatology who has done the required testing and pt supposed to take the medication Mavyret which she never started taking it as she does not know how to get it due to language barrier. Interpretor services will help her to get it done as per my todays discussion. Fibrosis elastogram was negative for Cirrhosis of liver F0 in 09/2019.     Hypothyroidism  This is a chronic health problem that is uncontrolled with current medications and lifestyle measures. Pt was supposed to be on medication  LT 25 mcg but she took only for 8 days and stopped it last month as she feels that she got edema all over the body due to it.    Essential hypertension  This is a chronic health problem that is well controlled with current medications and lifestyle measures. Pt was previously on Losartan/HCTZ and Amlodipine but currently only on Losartan 100 mg daily. B.P in the office today was 110/64 on Losartan 100mg daily.    Major depressive disorder with single episode, in remission (H)  This is a chronic health problem that is well controlled with current medications and lifestyle measures. Pt supposed to be on Paroxetine which she stopped taking it after 1 day few weeks back. PHQ-2 was 0.     Atherosclerosis of aorta (H)  This new problem identified on the patient recent ultrasound done for her liver electrogram and ultrasound renal showing atherosclerosis of aorta.  There is no lipid panel done anytime for this patient, not on aspirin.    DDD (degenerative disc disease), cervical  This new problem identified and added to the problem list today after reviewing her recent x-rays in June 2019 done for her generalized left-sided body pains at the emergency room.  Cervical spine x-ray positive for degenerative disc disease between C5-C7.  Does have ongoing radiculopathy with left arm pain extending from the neck.    Neck pain  This new problem which patient is having with ongoing neck pain more on the right side and also left arm pain which is been chronic but is worsening currently.    Thrombocytopenia (H)  This is a chronic health problem that is uncontrolled with current medications and lifestyle measures.  Last lab work in September 2019 showing low platelets at 109.  Denies any bleeding diathesis or skin purpura.    Anemia  This is a chronic health problem that is uncontrolled with current medications and lifestyle measures.  Patient had her lab work done in September 2019 showing mildly worsening anemia with hemoglobin at  "10.3, denies any active blood loss from stools.    Gastroesophageal reflux disease, esophagitis presence not specified  This new problem which patient states is mostly in the upper abdomen, feels burning sensation in the stomach.  Denies any nausea, vomiting or blood in the stools.      Patient Active Problem List   Diagnosis     Pain of left side of body     Hepatitis C, chronic (H)     Peripheral neuropathy     Hypothyroidism     Essential hypertension     Major depressive disorder with single episode, in remission (H)     Anemia     Thrombocytopenia (H)     Atherosclerosis of aorta (H)     DDD (degenerative disc disease), cervical     Menopause     Gastroesophageal reflux disease, esophagitis presence not specified     Neck pain     History reviewed. No pertinent surgical history.    Social History     Tobacco Use     Smoking status: Never Smoker     Smokeless tobacco: Never Used   Substance Use Topics     Alcohol use: Never     Frequency: Never     Family History   Problem Relation Age of Onset     Depression Sister            Reviewed and updated as needed this visit by Provider      Review of Systems  Constitutional: Denies fevers or chills  Eyes: Denies changes in vision  Ears/Nose/Throat/Mouth: Denies nasal congestion or sore throat   Cardiovascular: Denies chest pain or palpitations   Respiratory:  Positive for exertional dyspnea , Denies cough  Gastrointestinal/Hepatic: As mentioned in HPI above  Genitourinary: Denies dysuria or frequency  Musculoskeletal/Rheum: As mentioned in HPI above  Neurological: Denies headache  Psychiatric: Denies mood disorder   Endocrine: Denies hx of diabetes As mentioned in HPI above  Heme/Oncology/Lymph Nodes: Denies weight changes or enlarged LNs.   Allergic/Immunologic: Denies hx of allergies        PHYSICAL EXAM  VITAL SIGNS:   /64 (Patient Position: Sitting, Cuff Size: Adult Large)   Pulse 87   Temp 98.4  F (36.9  C) (Tympanic)   Resp 16   Ht 1.506 m (4' 11.3\")  "  Wt 73.9 kg (163 lb)   SpO2 100%   BMI 32.59 kg/m    Constitutional: Well developed, Well nourished, No acute distress, Non-toxic appearance.    HENT: Normocephalic, Atraumatic, Bilateral external ears normal, Oropharynx moist, No oral exudates, Nose normal. Eyes:  EOMI, Conjunctiva normal, No discharge.    Neck: Positive for tenderness on palpation of C7 spine, upper border of trapezius bilaterally has muscle spasm and pain, Supple, No stridor.    Lymphatic: No lymphadenopathy noted.    Cardiovascular: Regular rate and rhythm,  No murmurs, No rubs, No gallops.    Thorax & Lungs: Normal breath sounds, No respiratory distress, No wheezing, No chest tenderness.    Abdomen: Bowel sounds normal, Soft, Positive for mild tenderness in the epigastrium , No masses, No pulsatile masses.    Skin: Warm, Dry, No erythema, No rash.    Back: No tenderness on Lumbosacral spine but positive for paraspinal tenderness bilaterally and left sided sciatica -SLR not done due to pain , No CVA tenderness.   Extremities: Intact distal pulses, No edema, No tenderness, No cyanosis, No clubbing.    Musculoskeletal: No tenderness on the left hip joint but restricted range of movements with Chronic Osteoarthritis on the hip joint.  Neurologic: Alert & oriented x 3, Normal motor function, No focal deficits noted. Positive for numbness on the left lower extremity and Rt foot which is worse.   Psychiatric: Affect normal, Judgment normal, Mood normal.        Diagnostic Test Results:  Labs reviewed in Epic        Assessment and Plan  1. Peripheral polyneuropathy  2. Pain of left side of body  Uncontrolled, given her peripheral neuropathy and chronic left-sided body pains we will start her on Gabapentin at moderate doses, explained all side effects and given instructions to inform if no improvement so that we can increase the dose in 2 weeks to titrate it up. We will check for A1C and B12 deficiency and titrate the dose up.  - Hemoglobin A1c  -  Vitamin B12  - Folate  - gabapentin (NEURONTIN) 300 MG capsule; Take 1 capsule (300 mg) by mouth 2 times daily  Dispense: 60 capsule; Refill: 1      3. Chronic hepatitis C without hepatic coma (H)  Stable, reviewed recent hepatology notes and patient supposed to be on Mavret which she never picked up. Have provided her the contact numbers which she is supposed to call m and get the medication.  - Comprehensive metabolic panel    4. Hypothyroidism, unspecified type  6. Major depressive disorder with single episode, in remission (H)  Uncontrolled, patient noncompliant with her levothyroxine.  Not using any paroxetine for her depression and she feels okay.  PHQ 2 score was 0.  Does have ongoing fatigue will recheck her thyroid function and start her on thyroid replacement other than levothyroxine as patient states nonspecific side effects for this.  - TSH with free T4 reflex    5. Essential hypertension  Well-controlled, continue current losartan 100 mg daily.  Will check renal function.  - Comprehensive metabolic panel      7. Anemia, unspecified type  8. Thrombocytopenia (H)  10. Fatigue, unspecified type  11. Exertional dyspnea  Ongoing problem of fatigue and exertional dyspnea which is related to her anemia.  Last CBC in September 2019 showing hemoglobin at 10, will do the required lab work to make sure it is not worsening along with B12 and iron panel.  - CBC with platelets differential  - Iron and iron binding capacity  - Vitamin B12  - Folate  - Ferritin      12. Encounter for lipid screening for cardiovascular disease  13. Atherosclerosis of aorta (H)  Stable, lipid panel never checked per my chart review.  Reviewed the imaging positive for atherosclerosis and calcifications of aorta.Will start her on Aspirin and check for lipid panel before considering Statins.  - Lipid panel reflex to direct LDL Fasting  - aspirin (ASA) 81 MG EC tablet; Take 1 tablet (81 mg) by mouth daily  Dispense: 90 tablet; Refill:  1    14. Special screening for malignant neoplasms, colon  - Fecal colorectal cancer screen (FIT); Future    15. Need for vaccination  - TDAP, IM (10 - 64 YRS) - Adacel    16. DDD (degenerative disc disease), cervical  17. Neck pain  Uncontrolled, reviewed the recent x-ray of the cervical spine which is positive for degenerative disc disease.  Per my physical exam findings will give her the muscle relaxer as below.  - tiZANidine (ZANAFLEX) 4 MG tablet; Take 1 tablet (4 mg) by mouth 2 times daily as needed for muscle spasms  Dispense: 30 tablet; Refill: 1    18. Gastroesophageal reflux disease, esophagitis presence not specified  Uncontrolled, as mentioned in HPI were no physical exam findings most likely GERD.  We will start her on famotidine 20 mg twice daily.  - famotidine (PEPCID) 20 MG tablet; Take 1 tablet (20 mg) by mouth 2 times daily  Dispense: 60 tablet; Refill: 1    9. Age-related bone loss  19. Menopause  Ongoing problem as per the patient - she has never underwent the DEXA scan but was started on bisphosphonates around last year at her [facility] for possible osteoporosis which we do not have any reports.  Will make sure she gets it done 1 time before we restart on the medication.  - Vitamin D Deficiency  - DEXA - HIM Scan        Patient Instructions     Please do the lab work ordered, prescribed medications and explained the side effects to the patient which she understands.     =======================    Patient Education     Peripheral Neuropathy  Peripheral neuropathy is the result of damage to the peripheral nerves. It usually affects the arms or legs, and causes a change in physical feeling. Sometimes it causes weakness in the muscles. You may feel tingling, numbness or shooting pains. Symptoms may be more common at night. Skin may be extra sensitive to light touch or temperature changes.  Neuropathy may be caused by a complication of a chronic disease such as diabetes, virus or bacterial  infections, or physical injury. A ruptured disk with pressure on the spinal nerve may also lead to the problem. Certain vitamin deficiencies may also lead to it. It may also be caused by exposure to certain drugs or chemicals.  Home care    Tell the healthcare provider about all medicines you take. This includes prescription and over-the-counter medicines, vitamins, and herbs. Ask if any of the medicines may be causing your problems. Don't make any changes to prescription medicines without talking to your healthcare provider first.    You may be prescribed medicines to help relieve the tingling feeling or for pain. Take all medicines as directed.    A numb hand or foot may be more prone to injury. To help protect it:  ? Always use oven mitts.  ? Test water with an unaffected hand or foot.  ? Use caution when trimming nails. File sharp areas.  ? Wear shoes that fit well to avoid pressure points, blisters, and ulcers.  ? Inspect your hands and feet carefully (including the soles of your feet and between your toes) at least once a week. If you see red areas, sores, or other problems, tell your healthcare provider.    Follow-up care  Follow up with your doctor or as advised by our staff. You may need further testing or evaluation.  When to seek medical advice  Call your healthcare provider right away if any of the following occur:    Redness, swelling, cracking, or ulcer on any numb area, especially the feet    New symptoms of numbness or muscle weakness numbness    Loss of bowel or bladder control    Slurred speech, confusion, or trouble speaking, walking, or seeing  Date Last Reviewed: 3/1/2018    3509-3021 [employer]. [address] [place] [id number]. All rights reserved. This information is not intended as a substitute for professional medical care. Always follow your healthcare professional's instructions.    ====================  Patient Education     Discharge Instructions for  Gastroesophageal Reflux Disease (GERD)  Gastroesophageal reflux disease (GERD) is a backflow of acid from the stomach into the swallowing tube (esophagus).  Home care  These home care steps can help you manage GERD:    Maintain a healthy weight. Get help to lose any extra pounds.    Avoid lying down after meals.    Avoid eating late at night.    Elevate the head of your bed by 6 inches. You can do this by placing wooden blocks or bed risers under the head of your bed.    Avoid wearing tight-fitting clothes.    Avoid foods that might irritate your stomach, such as the following:  ? Alcohol  ? Fat  ? Chocolate  ? Caffeine  ? Spearmint or peppermint    Talk to your healthcare provider if you are taking any of the following medicines. These medicines can make GERD symptoms worse:  ? Calcium channel blockers  ? Theophylline  ? Anticholinergic medicines, such as oxybutynin and benzatropine    Begin an exercise program. Ask your healthcare provider how to get started. You can benefit from simple activities, such as walking or gardening.    Break the smoking habit. Enroll in a stop-smoking program to improve your chances of success.    Limit alcohol intake to no more than 2 drinks a day.    Take your medicines exactly as directed. Don t skip doses.    Avoid over-the-counter nonsteroidal anti-inflammatory medicines, such as aspirin and ibuprofen, unless recommended by your healthcare provider for certain conditions.     If possible, avoid nitrates (heart medicines, such as nitroglycerin and isosorbide dinitrate ).  Follow-up care  Make a follow-up appointment as directed by our staff.     When to call the healthcare provider  Call your healthcare provider immediately if you have any of the following:    Trouble swallowing    Pain when swallowing    Feeling of food caught in your chest or throat    Pain in the neck, chest, or back    Heartburn that causes you to vomit    Vomiting blood    Black or tarry stools (from digested  blood)    More saliva (watering of the mouth) than usual    Weight loss of more than 3% to 5% of your total body weight in a month    Hoarseness or sore throat that won t go away    Choking, coughing, or wheezing   Date Last Reviewed: 7/1/2016 2000-2019 [employer]. [address] [place] [id number]. All rights reserved. This information is not intended as a substitute for professional medical care. Always follow your healthcare professional's instructions.                    Return in about 2 weeks (around 3/24/2020), or if symptoms worsen or fail to improve, for AWV.    Sherlyn Orozco MD  [facility]

## 2020-03-10 NOTE — LETTER
March 11, 2020      Junaid Gunter  [address]    [facility] [address]        Dear ,    We are writing to inform you of your test results.    Your blood work is positive for anemia which is improved compared to the past. It does show increased cell size which depicts possible vitamin B12 and folate deficiency though both the levels checked are remaining normal.     Resulted Orders   CBC with platelets differential   Result Value Ref Range    WBC 5.2 4.0 - 11.0 10e9/L    RBC Count 3.49 (L) 3.8 - 5.2 10e12/L    Hemoglobin 11.3 (L) 11.7 - 15.7 g/dL    Hematocrit 36.0 35.0 - 47.0 %     (H) 78 - 100 fl    MCH 32.4 26.5 - 33.0 pg    MCHC 31.4 (L) 31.5 - 36.5 g/dL    RDW 13.8 10.0 - 15.0 %    Platelet Count 134 (L) 150 - 450 10e9/L    % Neutrophils 69.0 %    % Lymphocytes 27.0 %    % Monocytes 3.0 %    % Basophils 1.0 %    Absolute Neutrophil 3.5 1.6 - 8.3 10e9/L    Absolute Lymphocytes 1.4 0.8 - 5.3 10e9/L    Absolute Monocytes 0.2 0.0 - 1.3 10e9/L    Absolute Basophils 0.1 0.0 - 0.2 10e9/L    RBC Morphology Consistent with reported results     Platelet Estimate       Automated count confirmed.  Platelet morphology is normal.    Diff Method Manual Differential    Hemoglobin A1c   Result Value Ref Range    Hemoglobin A1C 5.1 0 - 5.6 %      Comment:      Normal <5.7% Prediabetes 5.7-6.4%  Diabetes 6.5% or higher - adopted from ADA   consensus guidelines.     Vitamin B12   Result Value Ref Range    Vitamin B12 662 193 - 986 pg/mL   Folate   Result Value Ref Range    Folate 16.1 >5.4 ng/mL       If you have any questions or concerns, please call the clinic at the number listed above.       Sincerely,        Sherlyn Orozco MD

## 2020-03-10 NOTE — Clinical Note
Buster Prado, This patient was seen by a specialist at [facility] and I am pretty sure it should be a new patient to us. I billed it level 5 on the complexity , multiple issues and time based.     Please let me know if any corrections so that I can correct it.   Thank you,  Sherlyn

## 2020-03-10 NOTE — NURSING NOTE
Prior to immunization administration, verified patients identity using patient s name and date of birth. Please see Immunization Activity for additional information.     Screening Questionnaire for Adult Immunization    Are you sick today?   No   Do you have allergies to medications, food, a vaccine component or latex?   No   Have you ever had a serious reaction after receiving a vaccination?   No   Do you have a long-term health problem with heart, lung, kidney, or metabolic disease (e.g., diabetes), asthma, a blood disorder, no spleen, complement component deficiency, a cochlear implant, or a spinal fluid leak?  Are you on long-term aspirin therapy?   No   Do you have cancer, leukemia, HIV/AIDS, or any other immune system problem?   No   Do you have a parent, brother, or sister with an immune system problem?   No   In the past 3 months, have you taken medications that affect  your immune system, such as prednisone, other steroids, or anticancer drugs; drugs for the treatment of rheumatoid arthritis, Crohn s disease, or psoriasis; or have you had radiation treatments?   No   Have you had a seizure, or a brain or other nervous system problem?   No   During the past year, have you received a transfusion of blood or blood    products, or been given immune (gamma) globulin or antiviral drug?   No   For women: Are you pregnant or is there a chance you could become       pregnant during the next month?   No   Have you received any vaccinations in the past 4 weeks?   No     Immunization questionnaire answers were all negative.        Per orders of Dr. Orozco, injection of Adacel given by Caron Shelton MA. Patient instructed to remain in clinic for 15 minutes afterwards, and to report any adverse reaction to me immediately.       Screening performed by Caron Shelton MA on 3/10/2020 at 11:54 AM.  Caron Shelton MA on 3/10/2020 at 11:55 AM

## 2020-03-10 NOTE — ASSESSMENT & PLAN NOTE
This is a chronic health problem that is uncontrolled with current medications and lifestyle measures.  Last lab work in September 2019 showing low platelets at 109.  Denies any bleeding diathesis or skin purpura.

## 2020-03-10 NOTE — ASSESSMENT & PLAN NOTE
This new problem which patient is having with ongoing neck pain more on the right side and also left arm pain which is been chronic but is worsening currently.

## 2020-03-10 NOTE — PATIENT INSTRUCTIONS
Please do the lab work ordered, prescribed medications and explained the side effects to the patient which she understands.     =======================    Patient Education     Peripheral Neuropathy  Peripheral neuropathy is the result of damage to the peripheral nerves. It usually affects the arms or legs, and causes a change in physical feeling. Sometimes it causes weakness in the muscles. You may feel tingling, numbness or shooting pains. Symptoms may be more common at night. Skin may be extra sensitive to light touch or temperature changes.  Neuropathy may be caused by a complication of a chronic disease such as diabetes, virus or bacterial infections, or physical injury. A ruptured disk with pressure on the spinal nerve may also lead to the problem. Certain vitamin deficiencies may also lead to it. It may also be caused by exposure to certain drugs or chemicals.  Home care    Tell the healthcare provider about all medicines you take. This includes prescription and over-the-counter medicines, vitamins, and herbs. Ask if any of the medicines may be causing your problems. Don't make any changes to prescription medicines without talking to your healthcare provider first.    You may be prescribed medicines to help relieve the tingling feeling or for pain. Take all medicines as directed.    A numb hand or foot may be more prone to injury. To help protect it:  ? Always use oven mitts.  ? Test water with an unaffected hand or foot.  ? Use caution when trimming nails. File sharp areas.  ? Wear shoes that fit well to avoid pressure points, blisters, and ulcers.  ? Inspect your hands and feet carefully (including the soles of your feet and between your toes) at least once a week. If you see red areas, sores, or other problems, tell your healthcare provider.    Follow-up care  Follow up with your doctor or as advised by our staff. You may need further testing or evaluation.  When to seek medical advice  Call your  healthcare provider right away if any of the following occur:    Redness, swelling, cracking, or ulcer on any numb area, especially the feet    New symptoms of numbness or muscle weakness numbness    Loss of bowel or bladder control    Slurred speech, confusion, or trouble speaking, walking, or seeing  Date Last Reviewed: 3/1/2018    [id number] [employer]. [address] [place] [address]. All rights reserved. This information is not intended as a substitute for professional medical care. Always follow your healthcare professional's instructions.    ====================  Patient Education     Discharge Instructions for Gastroesophageal Reflux Disease (GERD)  Gastroesophageal reflux disease (GERD) is a backflow of acid from the stomach into the swallowing tube (esophagus).  Home care  These home care steps can help you manage GERD:    Maintain a healthy weight. Get help to lose any extra pounds.    Avoid lying down after meals.    Avoid eating late at night.    Elevate the head of your bed by 6 inches. You can do this by placing wooden blocks or bed risers under the head of your bed.    Avoid wearing tight-fitting clothes.    Avoid foods that might irritate your stomach, such as the following:  ? Alcohol  ? Fat  ? Chocolate  ? Caffeine  ? Spearmint or peppermint    Talk to your healthcare provider if you are taking any of the following medicines. These medicines can make GERD symptoms worse:  ? Calcium channel blockers  ? Theophylline  ? Anticholinergic medicines, such as oxybutynin and benzatropine    Begin an exercise program. Ask your healthcare provider how to get started. You can benefit from simple activities, such as walking or gardening.    Break the smoking habit. Enroll in a stop-smoking program to improve your chances of success.    Limit alcohol intake to no more than 2 drinks a day.    Take your medicines exactly as directed. Don t skip doses.    Avoid over-the-counter nonsteroidal  anti-inflammatory medicines, such as aspirin and ibuprofen, unless recommended by your healthcare provider for certain conditions.     If possible, avoid nitrates (heart medicines, such as nitroglycerin and isosorbide dinitrate ).  Follow-up care  Make a follow-up appointment as directed by our staff.     When to call the healthcare provider  Call your healthcare provider immediately if you have any of the following:    Trouble swallowing    Pain when swallowing    Feeling of food caught in your chest or throat    Pain in the neck, chest, or back    Heartburn that causes you to vomit    Vomiting blood    Black or tarry stools (from digested blood)    More saliva (watering of the mouth) than usual    Weight loss of more than 3% to 5% of your total body weight in a month    Hoarseness or sore throat that won t go away    Choking, coughing, or wheezing   Date Last Reviewed: 7/1/2016 2000-2019 [employer]. [address] [place] [address], [place] [address]. All rights reserved. This information is not intended as a substitute for professional medical care. Always follow your healthcare professional's instructions.

## 2020-03-11 DIAGNOSIS — I70.0 ATHEROSCLEROSIS OF AORTA (H): ICD-10-CM

## 2020-03-11 DIAGNOSIS — E03.9 HYPOTHYROIDISM, UNSPECIFIED TYPE: Primary | ICD-10-CM

## 2020-03-11 LAB
ALBUMIN SERPL-MCNC: 3.3 G/DL (ref 3.4–5)
ALP SERPL-CCNC: 94 U/L (ref 40–150)
ALT SERPL W P-5'-P-CCNC: 25 U/L (ref 0–50)
ANION GAP SERPL CALCULATED.3IONS-SCNC: 9 MMOL/L (ref 3–14)
AST SERPL W P-5'-P-CCNC: 21 U/L (ref 0–45)
BILIRUB SERPL-MCNC: 0.3 MG/DL (ref 0.2–1.3)
BUN SERPL-MCNC: 18 MG/DL (ref 7–30)
CALCIUM SERPL-MCNC: 8.8 MG/DL (ref 8.5–10.1)
CHLORIDE SERPL-SCNC: 108 MMOL/L (ref 94–109)
CHOLEST SERPL-MCNC: 199 MG/DL
CO2 SERPL-SCNC: 21 MMOL/L (ref 20–32)
CREAT SERPL-MCNC: 0.9 MG/DL (ref 0.52–1.04)
DEPRECATED CALCIDIOL+CALCIFEROL SERPL-MC: 31 UG/L (ref 20–75)
FERRITIN SERPL-MCNC: 149 NG/ML (ref 8–252)
GFR SERPL CREATININE-BSD FRML MDRD: 65 ML/MIN/{1.73_M2}
GLUCOSE SERPL-MCNC: 83 MG/DL (ref 70–99)
HDLC SERPL-MCNC: 70 MG/DL
IRON SATN MFR SERPL: 29 % (ref 15–46)
IRON SERPL-MCNC: 92 UG/DL (ref 35–180)
LDLC SERPL CALC-MCNC: 111 MG/DL
NONHDLC SERPL-MCNC: 129 MG/DL
POTASSIUM SERPL-SCNC: 3.9 MMOL/L (ref 3.4–5.3)
PROT SERPL-MCNC: 8.2 G/DL (ref 6.8–8.8)
SODIUM SERPL-SCNC: 138 MMOL/L (ref 133–144)
T4 FREE SERPL-MCNC: 1.05 NG/DL (ref 0.76–1.46)
TIBC SERPL-MCNC: 318 UG/DL (ref 240–430)
TRIGL SERPL-MCNC: 92 MG/DL
TSH SERPL DL<=0.005 MIU/L-ACNC: 6.06 MU/L (ref 0.4–4)

## 2020-03-11 RX ORDER — THYROID 15 MG/1
15 TABLET ORAL DAILY
Qty: 30 TABLET | Refills: 1 | Status: ON HOLD | OUTPATIENT
Start: 2020-03-11 | End: 2022-02-19

## 2020-03-11 RX ORDER — SIMVASTATIN 10 MG
10 TABLET ORAL AT BEDTIME
Qty: 90 TABLET | Refills: 1 | Status: ON HOLD | OUTPATIENT
Start: 2020-03-11 | End: 2020-06-24

## 2020-03-17 ENCOUNTER — APPOINTMENT (OUTPATIENT)
Dept: INTERPRETER SERVICES | Facility: CLINIC | Age: 70
End: 2020-03-17
Payer: COMMERCIAL

## 2020-04-15 ENCOUNTER — TELEPHONE (OUTPATIENT)
Dept: FAMILY MEDICINE | Facility: CLINIC | Age: 70
End: 2020-04-15

## 2020-04-16 ENCOUNTER — APPOINTMENT (OUTPATIENT)
Dept: INTERPRETER SERVICES | Facility: CLINIC | Age: 70
End: 2020-04-16
Payer: COMMERCIAL

## 2020-04-16 NOTE — TELEPHONE ENCOUNTER
Please schedule a Telephone visit with me for follow up on previous office visit.      Thank you,  Sherlyn Orozco MD on 4/15/2020 at 7:33 PM

## 2020-06-20 ENCOUNTER — APPOINTMENT (OUTPATIENT)
Dept: CT IMAGING | Facility: CLINIC | Age: 70
End: 2020-06-20
Attending: EMERGENCY MEDICINE
Payer: COMMERCIAL

## 2020-06-20 ENCOUNTER — HOSPITAL ENCOUNTER (EMERGENCY)
Facility: CLINIC | Age: 70
Discharge: HOME | End: 2020-06-20
Attending: EMERGENCY MEDICINE | Admitting: EMERGENCY MEDICINE
Payer: COMMERCIAL

## 2020-06-20 VITALS
WEIGHT: 165 LBS | BODY MASS INDEX: 32.99 KG/M2 | SYSTOLIC BLOOD PRESSURE: 116 MMHG | TEMPERATURE: 98.1 F | DIASTOLIC BLOOD PRESSURE: 61 MMHG | HEART RATE: 70 BPM | OXYGEN SATURATION: 92 % | RESPIRATION RATE: 18 BRPM

## 2020-06-20 DIAGNOSIS — S22.060A COMPRESSION FRACTURE OF T7 VERTEBRA, INITIAL ENCOUNTER (H): ICD-10-CM

## 2020-06-20 DIAGNOSIS — K44.9 HIATAL HERNIA: ICD-10-CM

## 2020-06-20 LAB
ABO + RH BLD: NORMAL
ABO + RH BLD: NORMAL
ALBUMIN SERPL-MCNC: 3.2 G/DL (ref 3.4–5)
ALBUMIN UR-MCNC: NEGATIVE MG/DL
ALP SERPL-CCNC: 82 U/L (ref 40–150)
ALT SERPL W P-5'-P-CCNC: 20 U/L (ref 0–50)
ANION GAP SERPL CALCULATED.3IONS-SCNC: 4 MMOL/L (ref 3–14)
APPEARANCE UR: CLEAR
AST SERPL W P-5'-P-CCNC: 22 U/L (ref 0–45)
BASOPHILS # BLD AUTO: 0 10E9/L (ref 0–0.2)
BASOPHILS NFR BLD AUTO: 0.2 %
BILIRUB SERPL-MCNC: 0.3 MG/DL (ref 0.2–1.3)
BILIRUB UR QL STRIP: NEGATIVE
BLD GP AB SCN SERPL QL: NORMAL
BLOOD BANK CMNT PATIENT-IMP: NORMAL
BUN SERPL-MCNC: 19 MG/DL (ref 7–30)
CALCIUM SERPL-MCNC: 8.5 MG/DL (ref 8.5–10.1)
CHLORIDE SERPL-SCNC: 99 MMOL/L (ref 94–109)
CO2 SERPL-SCNC: 27 MMOL/L (ref 20–32)
COLOR UR AUTO: NORMAL
CREAT SERPL-MCNC: 0.95 MG/DL (ref 0.52–1.04)
DIFFERENTIAL METHOD BLD: ABNORMAL
EOSINOPHIL # BLD AUTO: 0 10E9/L (ref 0–0.7)
EOSINOPHIL NFR BLD AUTO: 0.2 %
ERYTHROCYTE [DISTWIDTH] IN BLOOD BY AUTOMATED COUNT: 13.7 % (ref 10–15)
GFR SERPL CREATININE-BSD FRML MDRD: 61 ML/MIN/{1.73_M2}
GLUCOSE SERPL-MCNC: 96 MG/DL (ref 70–99)
GLUCOSE UR STRIP-MCNC: NEGATIVE MG/DL
HCT VFR BLD AUTO: 31.1 % (ref 35–47)
HGB BLD-MCNC: 10 G/DL (ref 11.7–15.7)
HGB UR QL STRIP: NEGATIVE
IMM GRANULOCYTES # BLD: 0.1 10E9/L (ref 0–0.4)
IMM GRANULOCYTES NFR BLD: 1.9 %
INR PPP: 1.04 (ref 0.86–1.14)
INTERPRETATION ECG - MUSE: NORMAL
KETONES UR STRIP-MCNC: NEGATIVE MG/DL
LEUKOCYTE ESTERASE UR QL STRIP: NEGATIVE
LIPASE SERPL-CCNC: 210 U/L (ref 73–393)
LYMPHOCYTES # BLD AUTO: 1.7 10E9/L (ref 0.8–5.3)
LYMPHOCYTES NFR BLD AUTO: 30.1 %
MCH RBC QN AUTO: 31.7 PG (ref 26.5–33)
MCHC RBC AUTO-ENTMCNC: 32.2 G/DL (ref 31.5–36.5)
MCV RBC AUTO: 99 FL (ref 78–100)
MONOCYTES # BLD AUTO: 0.6 10E9/L (ref 0–1.3)
MONOCYTES NFR BLD AUTO: 10.2 %
NEUTROPHILS # BLD AUTO: 3.3 10E9/L (ref 1.6–8.3)
NEUTROPHILS NFR BLD AUTO: 57.4 %
NITRATE UR QL: NEGATIVE
NRBC # BLD AUTO: 0 10*3/UL
NRBC BLD AUTO-RTO: 0 /100
PH UR STRIP: 6 PH (ref 5–7)
PLATELET # BLD AUTO: 119 10E9/L (ref 150–450)
POTASSIUM SERPL-SCNC: 4.1 MMOL/L (ref 3.4–5.3)
PROT SERPL-MCNC: 7.4 G/DL (ref 6.8–8.8)
RBC # BLD AUTO: 3.15 10E12/L (ref 3.8–5.2)
RBC #/AREA URNS AUTO: <1 /HPF (ref 0–2)
SODIUM SERPL-SCNC: 130 MMOL/L (ref 133–144)
SOURCE: NORMAL
SP GR UR STRIP: 1 (ref 1–1.03)
SPECIMEN EXP DATE BLD: NORMAL
SQUAMOUS #/AREA URNS AUTO: <1 /HPF (ref 0–1)
UROBILINOGEN UR STRIP-MCNC: NORMAL MG/DL (ref 0–2)
WBC # BLD AUTO: 5.8 10E9/L (ref 4–11)
WBC #/AREA URNS AUTO: 1 /HPF (ref 0–5)

## 2020-06-20 PROCEDURE — 99285 EMERGENCY DEPT VISIT HI MDM: CPT | Mod: 25

## 2020-06-20 PROCEDURE — 25000128 H RX IP 250 OP 636: Performed by: EMERGENCY MEDICINE

## 2020-06-20 PROCEDURE — 93005 ELECTROCARDIOGRAM TRACING: CPT

## 2020-06-20 PROCEDURE — 81001 URINALYSIS AUTO W/SCOPE: CPT | Performed by: EMERGENCY MEDICINE

## 2020-06-20 PROCEDURE — 83690 ASSAY OF LIPASE: CPT | Performed by: EMERGENCY MEDICINE

## 2020-06-20 PROCEDURE — 80053 COMPREHEN METABOLIC PANEL: CPT | Performed by: EMERGENCY MEDICINE

## 2020-06-20 PROCEDURE — 96374 THER/PROPH/DIAG INJ IV PUSH: CPT | Mod: 59

## 2020-06-20 PROCEDURE — 93010 ELECTROCARDIOGRAM REPORT: CPT | Mod: Z6 | Performed by: EMERGENCY MEDICINE

## 2020-06-20 PROCEDURE — 25000132 ZZH RX MED GY IP 250 OP 250 PS 637: Performed by: EMERGENCY MEDICINE

## 2020-06-20 PROCEDURE — 86850 RBC ANTIBODY SCREEN: CPT | Performed by: EMERGENCY MEDICINE

## 2020-06-20 PROCEDURE — 75635 CT ANGIO ABDOMINAL ARTERIES: CPT

## 2020-06-20 PROCEDURE — 86900 BLOOD TYPING SEROLOGIC ABO: CPT | Performed by: EMERGENCY MEDICINE

## 2020-06-20 PROCEDURE — 86901 BLOOD TYPING SEROLOGIC RH(D): CPT | Performed by: EMERGENCY MEDICINE

## 2020-06-20 PROCEDURE — 25000125 ZZHC RX 250: Performed by: EMERGENCY MEDICINE

## 2020-06-20 PROCEDURE — 99285 EMERGENCY DEPT VISIT HI MDM: CPT | Mod: 25 | Performed by: EMERGENCY MEDICINE

## 2020-06-20 PROCEDURE — 71275 CT ANGIOGRAPHY CHEST: CPT

## 2020-06-20 PROCEDURE — 96375 TX/PRO/DX INJ NEW DRUG ADDON: CPT

## 2020-06-20 PROCEDURE — 85610 PROTHROMBIN TIME: CPT | Performed by: EMERGENCY MEDICINE

## 2020-06-20 PROCEDURE — 85025 COMPLETE CBC W/AUTO DIFF WBC: CPT | Performed by: EMERGENCY MEDICINE

## 2020-06-20 RX ORDER — LIDOCAINE 4 G/G
1 PATCH TOPICAL ONCE
Status: DISCONTINUED | OUTPATIENT
Start: 2020-06-20 | End: 2020-06-20 | Stop reason: HOSPADM

## 2020-06-20 RX ORDER — ONDANSETRON 2 MG/ML
4 INJECTION INTRAMUSCULAR; INTRAVENOUS ONCE
Status: COMPLETED | OUTPATIENT
Start: 2020-06-20 | End: 2020-06-20

## 2020-06-20 RX ORDER — HYDROMORPHONE HYDROCHLORIDE 1 MG/ML
0.5 INJECTION, SOLUTION INTRAMUSCULAR; INTRAVENOUS; SUBCUTANEOUS ONCE
Status: COMPLETED | OUTPATIENT
Start: 2020-06-20 | End: 2020-06-20

## 2020-06-20 RX ORDER — IOPAMIDOL 755 MG/ML
100 INJECTION, SOLUTION INTRAVASCULAR ONCE
Status: COMPLETED | OUTPATIENT
Start: 2020-06-20 | End: 2020-06-20

## 2020-06-20 RX ADMIN — LIDOCAINE 1 PATCH: 560 PATCH PERCUTANEOUS; TOPICAL; TRANSDERMAL at 12:00

## 2020-06-20 RX ADMIN — ONDANSETRON 4 MG: 2 INJECTION INTRAMUSCULAR; INTRAVENOUS at 11:19

## 2020-06-20 RX ADMIN — IOPAMIDOL 100 ML: 755 INJECTION, SOLUTION INTRAVENOUS at 09:44

## 2020-06-20 RX ADMIN — LIDOCAINE HYDROCHLORIDE 30 ML: 20 SOLUTION ORAL; TOPICAL at 11:02

## 2020-06-20 RX ADMIN — HYDROMORPHONE HYDROCHLORIDE 0.5 MG: 1 INJECTION, SOLUTION INTRAMUSCULAR; INTRAVENOUS; SUBCUTANEOUS at 08:54

## 2020-06-20 ASSESSMENT — ENCOUNTER SYMPTOMS
NECK PAIN: 1
NAUSEA: 0
FEVER: 0
VOMITING: 0
COUGH: 0
ABDOMINAL PAIN: 1
CONSTIPATION: 0
BACK PAIN: 1
SHORTNESS OF BREATH: 0
DIARRHEA: 0
BRUISES/BLEEDS EASILY: 0

## 2020-06-20 NOTE — ED PROVIDER NOTES
ED Provider Note  [facility]      History     Chief Complaint   Patient presents with     Abdominal Pain     HPI  Junaid Gunter is a 70 year old female who presents with abdominal pain.   Not fully clear when pain began. Noted on a primary care appointment on 3/10/2020 and thought to be GERD. Started on pepcid. Pt notes taking for the last 10 days without improvement. Describes pain as umbilical, constant with occasional worsening with feeling of cramping. Not clearly worse with eating. No fever. Thinks she may have had an abdominal surgery at a young age for a possible urinary issue. No previous known appendectomy, cholecystectomy, or other bowel surgery.   Has hep C and pt's team is working to get her started on treatment with Mavyret antiviral.     Pt denies urinary symptoms. Does report pain in the neck, left arm and leg that is chronic. No new reported weakness, numbness, gait instability.       Past Medical History  Past Medical History:   Diagnosis Date     DJD (degenerative joint disease)      HCV (hepatitis C virus)      Hypertension      Osteoporosis      No past surgical history on file.  ACETAMINOPHEN EXTRA STRENGTH 500 MG tablet  aspirin (ASA) 81 MG EC tablet  diclofenac (VOLTAREN) 1 % topical gel  famotidine (PEPCID) 20 MG tablet  ferrous gluconate (FERGON) 324 (38 Fe) MG tablet  Ferrous Gluconate 324 (37.5 Fe) MG TABS  FLUZONE HIGH-DOSE 0.5 ML injection  gabapentin (NEURONTIN) 300 MG capsule  glecaprevir-pibrentasvir (MAVYRET) 100-40 MG per tablet  losartan (COZAAR) 100 MG tablet  PREVNAR 13 SUSP injection  SENNA-LAX 8.6 MG tablet  simvastatin (ZOCOR) 10 MG tablet  thyroid (ARMOUR) 15 MG tablet  tiZANidine (ZANAFLEX) 4 MG tablet  vitamin D3 (CHOLECALCIFEROL) 2000 units (50 mcg) tablet      Allergies   Allergen Reactions     Egg [Chicken-Derived Products (Egg)] Itching     Past medical history, past surgical history, medications, and allergies were reviewed with the  patient. Additional pertinent items: None    Family History  Family History   Problem Relation Age of Onset     Depression Sister      Family history was reviewed with the patient. Additional pertinent items: None    Social History  Social History     Tobacco Use     Smoking status: Never Smoker     Smokeless tobacco: Never Used   Substance Use Topics     Alcohol use: Never     Frequency: Never     Drug use: Never      Social history was reviewed with the patient. Additional pertinent items: None    Review of Systems   Constitutional: Negative for fever.   Respiratory: Negative for cough and shortness of breath.    Gastrointestinal: Positive for abdominal pain. Negative for constipation, diarrhea, nausea and vomiting.   Genitourinary: Negative.    Musculoskeletal: Positive for back pain and neck pain.   Allergic/Immunologic: Negative for immunocompromised state.   Hematological: Does not bruise/bleed easily.     A complete review of systems was performed with pertinent positives and negatives noted in the HPI, and all other systems negative.    Physical Exam   BP: (!) 163/72  Pulse: 73  Temp: 98.1  F (36.7  C)  Resp: 18  Weight: 74.8 kg (165 lb)  SpO2: 100 %     Physical Exam  Gen:A&Ox3, uncomfortable  HEENT:PERRL, no facial tenderness or wounds, head atraumatic, oropharynx clear, mucous membranes moist, TMs clear bilaterally  Neck:no bony tenderness or step offs, no JVD, trachea midline  Back: low cervical and mid thoracic back pain, no CVA tenderness bilaterally  CV:RRR without murmurs  PULM:Clear to auscultation bilaterally  Abd:soft, tender in the epigastrium, negative joshua's sign, nondistended  UE:No traumatic injuries, skin normal  LE:no traumatic injuries, cool feet bilaterally. Right foot + DP and PT pulses, left foot with decreased DP and normal PT pulse. Capillary refill 3 seconds bilaterally  Neuro:CN II-XII intact, strength 5/5 throughout, sensation intact throughout. Gait stable.   Skin: no rashes or  ecchymoses      ED Course      Procedures             EKG Interpretation:      Interpreted by Ashley Hunter MD  Time reviewed: 8:57AM  Symptoms at time of EKG: abdominal and back pain  Rhythm: normal sinus   Rate: 73  Axis: normal  Ectopy: none  Conduction: normal  ST Segments/ T Waves: No ST-T wave changes  Q Waves: none  Comparison to prior: No old EKG available    Clinical Impression: normal EKG     Results for orders placed or performed during the hospital encounter of 06/20/20   CTA Chest Abdomen Pelvis w Contrast     Status: None (Preliminary result)    Narrative    Preliminary Report - The following report is a preliminary  interpretation.      Impression    IMPRESSION:   1. No evidence of thoracic aorta or abdominal aortic dissection. The  visualized proximal lower extremity arteries are patent without  atherosclerotic disease.    2. T7 compression deformity, no priors available to determine  chronicity. Correlate for focal tenderness.    3. Small hiatal hernia.    4. Prominent right paratracheal lymph node, likely reactive. Attention  on follow-up.       CBC with platelets differential     Status: Abnormal   Result Value Ref Range    WBC 5.8 4.0 - 11.0 10e9/L    RBC Count 3.15 (L) 3.8 - 5.2 10e12/L    Hemoglobin 10.0 (L) 11.7 - 15.7 g/dL    Hematocrit 31.1 (L) 35.0 - 47.0 %    MCV 99 78 - 100 fl    MCH 31.7 26.5 - 33.0 pg    MCHC 32.2 31.5 - 36.5 g/dL    RDW 13.7 10.0 - 15.0 %    Platelet Count 119 (L) 150 - 450 10e9/L    Diff Method Automated Method     % Neutrophils 57.4 %    % Lymphocytes 30.1 %    % Monocytes 10.2 %    % Eosinophils 0.2 %    % Basophils 0.2 %    % Immature Granulocytes 1.9 %    Nucleated RBCs 0 0 /100    Absolute Neutrophil 3.3 1.6 - 8.3 10e9/L    Absolute Lymphocytes 1.7 0.8 - 5.3 10e9/L    Absolute Monocytes 0.6 0.0 - 1.3 10e9/L    Absolute Eosinophils 0.0 0.0 - 0.7 10e9/L    Absolute Basophils 0.0 0.0 - 0.2 10e9/L    Abs Immature Granulocytes 0.1 0 - 0.4 10e9/L    Absolute  Nucleated RBC 0.0    Comprehensive metabolic panel     Status: Abnormal   Result Value Ref Range    Sodium 130 (L) 133 - 144 mmol/L    Potassium 4.1 3.4 - 5.3 mmol/L    Chloride 99 94 - 109 mmol/L    Carbon Dioxide 27 20 - 32 mmol/L    Anion Gap 4 3 - 14 mmol/L    Glucose 96 70 - 99 mg/dL    Urea Nitrogen 19 7 - 30 mg/dL    Creatinine 0.95 0.52 - 1.04 mg/dL    GFR Estimate 61 >60 mL/min/[1.73_m2]    GFR Estimate If Black 70 >60 mL/min/[1.73_m2]    Calcium 8.5 8.5 - 10.1 mg/dL    Bilirubin Total 0.3 0.2 - 1.3 mg/dL    Albumin 3.2 (L) 3.4 - 5.0 g/dL    Protein Total 7.4 6.8 - 8.8 g/dL    Alkaline Phosphatase 82 40 - 150 U/L    ALT 20 0 - 50 U/L    AST 22 0 - 45 U/L   Lipase     Status: None   Result Value Ref Range    Lipase 210 73 - 393 U/L   INR     Status: None   Result Value Ref Range    INR 1.04 0.86 - 1.14   UA with Microscopic reflex to Culture     Status: None    Specimen: Midstream Urine   Result Value Ref Range    Color Urine Light Yellow     Appearance Urine Clear     Glucose Urine Negative NEG^Negative mg/dL    Bilirubin Urine Negative NEG^Negative    Ketones Urine Negative NEG^Negative mg/dL    Specific Gravity Urine 1.003 1.003 - 1.035    Blood Urine Negative NEG^Negative    pH Urine 6.0 5.0 - 7.0 pH    Protein Albumin Urine Negative NEG^Negative mg/dL    Urobilinogen mg/dL Normal 0.0 - 2.0 mg/dL    Nitrite Urine Negative NEG^Negative    Leukocyte Esterase Urine Negative NEG^Negative    Source Midstream Urine     WBC Urine 1 0 - 5 /HPF    RBC Urine <1 0 - 2 /HPF    Squamous Epithelial /HPF Urine <1 0 - 1 /HPF   EKG 12 lead     Status: None (Preliminary result)   Result Value Ref Range    Interpretation ECG Click View Image link to view waveform and result    ABO/Rh type and screen     Status: None   Result Value Ref Range    ABO O     RH(D) Pos     Antibody Screen Neg     Test Valid Only At          [facility]    Specimen Expires 06/23/2020      Medications    Lidocaine (LIDOCARE) 4 % Patch 1 patch (has no administration in time range)   HYDROmorphone (PF) (DILAUDID) injection 0.5 mg (0.5 mg Intravenous Given 6/20/20 0854)   iopamidol (ISOVUE-370) solution 100 mL (100 mLs Intravenous Given 6/20/20 0944)   sodium chloride (PF) 0.9% PF flush 90 mL (90 mLs Intravenous Given 6/20/20 0944)   lidocaine (XYLOCAINE) 2 % 15 mL, alum & mag hydroxide-simethicone (MAALOX  ES) 15 mL GI Cocktail (30 mLs Oral Given 6/20/20 1102)   ondansetron (ZOFRAN) injection 4 mg (4 mg Intravenous Given 6/20/20 1119)        Assessments & Plan (with Medical Decision Making)   69 yo F presenting with umbilical abdominal pain.   Subacute symptoms, worse for the the last few weeks.   Seen in primary care 10 days ago and started on H2 blocker. Pain worse today and described as cramping. Associated with neck and back pain radiating into the left arm and leg. Neuro exam today without acute findings.   Vitals stable.   DDx included AAA or thoracic aortic dissection, PUD, gastritis, cholecystitis and pancreatitis.   IV access obtained and lab testing done.   CBC with WBC of 5.8. Hgb stable at 10. Plts decreased at 119, chronic but worsening, thought to be 2/2 to chronic liver disease.   Glucose normal.   CMP with unremarkable Cr, LFTs. Sodium 130, and other electrolytes normal.   Lipase normal.  CT CAP done and shows no aortic abnormality.   CT demonstrates T7 compression fracture. On exam pt has more diffuse back pain, that has been present for 3 years with worsening over the last 2-3 months. Likely chronic to subacute. A lidocaine patch was placed to help with pain today. She was also given 1 dose of IV dilaudid while in the ED. Recommended removing lidocaine patch in 12 hours, using tylenol PRN, and continuing newly prescribed gabapentin. Pt notes she has not been taking this daily since it was started 10 days ago. She and I discussed daily use with the possibility of dose increase on follow up if  needed. Referred to Sports Medicine/Spine. Also will need follow up with primary care to reassess her pain management.     CT also with hiatal hernia. Continue H2 blocker.     Discharged with follow up with primary care.         I have reviewed the nursing notes. I have reviewed the findings, diagnosis, plan and need for follow up with the patient.    New Prescriptions    No medications on file       Final diagnoses:   Hiatal hernia   Compression fracture of T7 vertebra, initial encounter (H)       --  Ashley Hunter MD [facility]  Emergency Medicine   [facility] EMERGENCY DEPARTMENT  6/20/2020     Ashley Hunter MD  06/20/20 1145

## 2020-06-20 NOTE — ED AVS SNAPSHOT
[facility], [place], Emergency Department  [address] [place] [id number]  Phone:  [phone]                                    Junaid Gunter   MRN: [id number]    Department:  [facility], Emergency Department   Date of Visit:  6/20/2020           After Visit Summary Signature Page    I have received my discharge instructions, and my questions have been answered. I have discussed any challenges I see with this plan with the nurse or doctor.    ..........................................................................................................................................  Patient/Patient Representative Signature      ..........................................................................................................................................  Patient Representative Print Name and Relationship to Patient    ..................................................               ................................................  Date                                   Time    ..........................................................................................................................................  Reviewed by Signature/Title    ...................................................              ..............................................  Date                                               Time          22EPIC Rev 08/18

## 2020-06-20 NOTE — DISCHARGE INSTRUCTIONS
Thank you for coming to [facility] Emergency Department.     Your testing today showed a compression fracture of the T7 vertebra (thoracic #7). Since you have been having pain for several months to years, this likely occurred a while back. We have referred you to sports medicine for further evaluation of your back pain, and please also follow up in your primary care clinic as soon as possible to check in on how your pain is doing. For your pain, continue taking gabapentin once daily. You may also use tylenol according to package instructions. Today, a lidocaine patch was put on your back. Keep this on for 12 hours, then remove it.     For your stomach, the CT today showed a hiatal hernia. Continue taking the antacid medication prescribed by your clinic, called Pepcid. Eat more frequent, small meals, instead of large meals.

## 2020-06-20 NOTE — ED NOTES
Bed: ED15  Expected date: 6/20/20  Expected time: 7:49 AM  Means of arrival: Ambulance  Comments:  Ben 411    71 y/o Female    Abdominal pain    Triaged:  YELLOW

## 2020-06-21 ENCOUNTER — HOSPITAL ENCOUNTER (OUTPATIENT)
Facility: CLINIC | Age: 70
Setting detail: OBSERVATION
Discharge: HOME | End: 2020-06-24
Attending: EMERGENCY MEDICINE | Admitting: EMERGENCY MEDICINE
Payer: COMMERCIAL

## 2020-06-21 ENCOUNTER — APPOINTMENT (OUTPATIENT)
Dept: ULTRASOUND IMAGING | Facility: CLINIC | Age: 70
End: 2020-06-21
Attending: EMERGENCY MEDICINE
Payer: COMMERCIAL

## 2020-06-21 DIAGNOSIS — R11.2 NAUSEA AND VOMITING, INTRACTABILITY OF VOMITING NOT SPECIFIED, UNSPECIFIED VOMITING TYPE: ICD-10-CM

## 2020-06-21 DIAGNOSIS — Z20.822 2019-NCOV NOT DETECTED: ICD-10-CM

## 2020-06-21 DIAGNOSIS — M54.50 CHRONIC LOW BACK PAIN, UNSPECIFIED BACK PAIN LATERALITY, UNSPECIFIED WHETHER SCIATICA PRESENT: ICD-10-CM

## 2020-06-21 DIAGNOSIS — R19.7 DIARRHEA, UNSPECIFIED TYPE: ICD-10-CM

## 2020-06-21 DIAGNOSIS — I70.0 ATHEROSCLEROSIS OF AORTA (H): ICD-10-CM

## 2020-06-21 DIAGNOSIS — R19.7 DIARRHEA: ICD-10-CM

## 2020-06-21 DIAGNOSIS — E87.1 HYPONATREMIA: ICD-10-CM

## 2020-06-21 DIAGNOSIS — B96.81 GASTRIC ULCER DUE TO HELICOBACTER PYLORI, ACUTE: Primary | ICD-10-CM

## 2020-06-21 DIAGNOSIS — R10.84 GENERALIZED ABDOMINAL PAIN: ICD-10-CM

## 2020-06-21 DIAGNOSIS — G89.29 CHRONIC LOW BACK PAIN, UNSPECIFIED BACK PAIN LATERALITY, UNSPECIFIED WHETHER SCIATICA PRESENT: ICD-10-CM

## 2020-06-21 DIAGNOSIS — K25.3 GASTRIC ULCER DUE TO HELICOBACTER PYLORI, ACUTE: Primary | ICD-10-CM

## 2020-06-21 PROBLEM — R10.9 ABDOMINAL PAIN: Status: ACTIVE | Noted: 2020-06-21

## 2020-06-21 LAB
ALBUMIN SERPL-MCNC: 3.4 G/DL (ref 3.4–5)
ALBUMIN UR-MCNC: NEGATIVE MG/DL
ALP SERPL-CCNC: 72 U/L (ref 40–150)
ALT SERPL W P-5'-P-CCNC: 23 U/L (ref 0–50)
ANION GAP SERPL CALCULATED.3IONS-SCNC: 7 MMOL/L (ref 3–14)
APPEARANCE UR: CLEAR
AST SERPL W P-5'-P-CCNC: 32 U/L (ref 0–45)
BASOPHILS # BLD AUTO: 0 10E9/L (ref 0–0.2)
BASOPHILS NFR BLD AUTO: 0 %
BILIRUB SERPL-MCNC: 0.4 MG/DL (ref 0.2–1.3)
BILIRUB UR QL STRIP: NEGATIVE
BUN SERPL-MCNC: 11 MG/DL (ref 7–30)
CALCIUM SERPL-MCNC: 8.8 MG/DL (ref 8.5–10.1)
CHLORIDE SERPL-SCNC: 94 MMOL/L (ref 94–109)
CO2 SERPL-SCNC: 25 MMOL/L (ref 20–32)
COLOR UR AUTO: ABNORMAL
CREAT SERPL-MCNC: 0.98 MG/DL (ref 0.52–1.04)
DIFFERENTIAL METHOD BLD: ABNORMAL
EOSINOPHIL # BLD AUTO: 0 10E9/L (ref 0–0.7)
EOSINOPHIL NFR BLD AUTO: 0 %
ERYTHROCYTE [DISTWIDTH] IN BLOOD BY AUTOMATED COUNT: 13.4 % (ref 10–15)
GFR SERPL CREATININE-BSD FRML MDRD: 58 ML/MIN/{1.73_M2}
GLUCOSE SERPL-MCNC: 101 MG/DL (ref 70–99)
GLUCOSE UR STRIP-MCNC: NEGATIVE MG/DL
HCT VFR BLD AUTO: 29.7 % (ref 35–47)
HGB BLD-MCNC: 9.9 G/DL (ref 11.7–15.7)
HGB UR QL STRIP: NEGATIVE
KETONES UR STRIP-MCNC: NEGATIVE MG/DL
LACTATE BLD-SCNC: 0.8 MMOL/L (ref 0.7–2)
LEUKOCYTE ESTERASE UR QL STRIP: NEGATIVE
LIPASE SERPL-CCNC: 359 U/L (ref 73–393)
LYMPHOCYTES # BLD AUTO: 1.1 10E9/L (ref 0.8–5.3)
LYMPHOCYTES NFR BLD AUTO: 20.5 %
MCH RBC QN AUTO: 32 PG (ref 26.5–33)
MCHC RBC AUTO-ENTMCNC: 33.3 G/DL (ref 31.5–36.5)
MCV RBC AUTO: 96 FL (ref 78–100)
MONOCYTES # BLD AUTO: 0.2 10E9/L (ref 0–1.3)
MONOCYTES NFR BLD AUTO: 4.5 %
NEUTROPHILS # BLD AUTO: 3.9 10E9/L (ref 1.6–8.3)
NEUTROPHILS NFR BLD AUTO: 75 %
NITRATE UR QL: NEGATIVE
PH UR STRIP: 7.5 PH (ref 5–7)
PLATELET # BLD AUTO: 94 10E9/L (ref 150–450)
PLATELET # BLD EST: ABNORMAL 10*3/UL
POTASSIUM SERPL-SCNC: 4.3 MMOL/L (ref 3.4–5.3)
PROT SERPL-MCNC: 7.4 G/DL (ref 6.8–8.8)
RBC # BLD AUTO: 3.09 10E12/L (ref 3.8–5.2)
RBC MORPH BLD: NORMAL
SARS-COV-2 PCR COMMENT: NORMAL
SARS-COV-2 RNA SPEC QL NAA+PROBE: NEGATIVE
SARS-COV-2 RNA SPEC QL NAA+PROBE: NORMAL
SODIUM SERPL-SCNC: 126 MMOL/L (ref 133–144)
SOURCE: ABNORMAL
SP GR UR STRIP: 1.01 (ref 1–1.03)
SPECIMEN SOURCE: NORMAL
SPECIMEN SOURCE: NORMAL
TROPONIN I BLD-MCNC: 0 UG/L (ref 0–0.08)
UROBILINOGEN UR STRIP-MCNC: NORMAL MG/DL (ref 0–2)
WBC # BLD AUTO: 5.2 10E9/L (ref 4–11)

## 2020-06-21 PROCEDURE — 84484 ASSAY OF TROPONIN QUANT: CPT

## 2020-06-21 PROCEDURE — U0003 INFECTIOUS AGENT DETECTION BY NUCLEIC ACID (DNA OR RNA); SEVERE ACUTE RESPIRATORY SYNDROME CORONAVIRUS 2 (SARS-COV-2) (CORONAVIRUS DISEASE [COVID-19]), AMPLIFIED PROBE TECHNIQUE, MAKING USE OF HIGH THROUGHPUT TECHNOLOGIES AS DESCRIBED BY CMS-2020-01-R: HCPCS | Performed by: EMERGENCY MEDICINE

## 2020-06-21 PROCEDURE — 96375 TX/PRO/DX INJ NEW DRUG ADDON: CPT | Performed by: EMERGENCY MEDICINE

## 2020-06-21 PROCEDURE — G0378 HOSPITAL OBSERVATION PER HR: HCPCS

## 2020-06-21 PROCEDURE — 25000132 ZZH RX MED GY IP 250 OP 250 PS 637: Performed by: NURSE PRACTITIONER

## 2020-06-21 PROCEDURE — 25800030 ZZH RX IP 258 OP 636: Performed by: EMERGENCY MEDICINE

## 2020-06-21 PROCEDURE — 76705 ECHO EXAM OF ABDOMEN: CPT

## 2020-06-21 PROCEDURE — 96361 HYDRATE IV INFUSION ADD-ON: CPT | Performed by: EMERGENCY MEDICINE

## 2020-06-21 PROCEDURE — 80053 COMPREHEN METABOLIC PANEL: CPT | Performed by: EMERGENCY MEDICINE

## 2020-06-21 PROCEDURE — 96365 THER/PROPH/DIAG IV INF INIT: CPT | Performed by: EMERGENCY MEDICINE

## 2020-06-21 PROCEDURE — 96375 TX/PRO/DX INJ NEW DRUG ADDON: CPT

## 2020-06-21 PROCEDURE — 99220 ZZC INITIAL OBSERVATION CARE,LEVL III: CPT | Mod: Z6 | Performed by: NURSE PRACTITIONER

## 2020-06-21 PROCEDURE — 25000128 H RX IP 250 OP 636: Performed by: NURSE PRACTITIONER

## 2020-06-21 PROCEDURE — C9803 HOPD COVID-19 SPEC COLLECT: HCPCS | Performed by: EMERGENCY MEDICINE

## 2020-06-21 PROCEDURE — 25000128 H RX IP 250 OP 636: Performed by: EMERGENCY MEDICINE

## 2020-06-21 PROCEDURE — 93005 ELECTROCARDIOGRAM TRACING: CPT | Performed by: EMERGENCY MEDICINE

## 2020-06-21 PROCEDURE — 83690 ASSAY OF LIPASE: CPT | Performed by: EMERGENCY MEDICINE

## 2020-06-21 PROCEDURE — 85025 COMPLETE CBC W/AUTO DIFF WBC: CPT | Performed by: EMERGENCY MEDICINE

## 2020-06-21 PROCEDURE — 83605 ASSAY OF LACTIC ACID: CPT | Performed by: EMERGENCY MEDICINE

## 2020-06-21 PROCEDURE — 87086 URINE CULTURE/COLONY COUNT: CPT | Performed by: EMERGENCY MEDICINE

## 2020-06-21 PROCEDURE — 81003 URINALYSIS AUTO W/O SCOPE: CPT | Performed by: EMERGENCY MEDICINE

## 2020-06-21 PROCEDURE — C9113 INJ PANTOPRAZOLE SODIUM, VIA: HCPCS | Performed by: NURSE PRACTITIONER

## 2020-06-21 PROCEDURE — 99285 EMERGENCY DEPT VISIT HI MDM: CPT | Mod: 25 | Performed by: EMERGENCY MEDICINE

## 2020-06-21 RX ORDER — ACETAMINOPHEN 325 MG/1
650 TABLET ORAL EVERY 4 HOURS PRN
Status: DISCONTINUED | OUTPATIENT
Start: 2020-06-21 | End: 2020-06-24 | Stop reason: HOSPADM

## 2020-06-21 RX ORDER — LOSARTAN POTASSIUM 100 MG/1
100 TABLET ORAL AT BEDTIME
Status: DISCONTINUED | OUTPATIENT
Start: 2020-06-21 | End: 2020-06-24 | Stop reason: HOSPADM

## 2020-06-21 RX ORDER — POLYETHYLENE GLYCOL 3350 17 G/17G
17 POWDER, FOR SOLUTION ORAL ONCE
Status: DISCONTINUED | OUTPATIENT
Start: 2020-06-21 | End: 2020-06-24 | Stop reason: HOSPADM

## 2020-06-21 RX ORDER — NALOXONE HYDROCHLORIDE 0.4 MG/ML
.1-.4 INJECTION, SOLUTION INTRAMUSCULAR; INTRAVENOUS; SUBCUTANEOUS
Status: DISCONTINUED | OUTPATIENT
Start: 2020-06-21 | End: 2020-06-24 | Stop reason: HOSPADM

## 2020-06-21 RX ORDER — ONDANSETRON 2 MG/ML
4 INJECTION INTRAMUSCULAR; INTRAVENOUS EVERY 6 HOURS PRN
Status: DISCONTINUED | OUTPATIENT
Start: 2020-06-21 | End: 2020-06-24 | Stop reason: HOSPADM

## 2020-06-21 RX ORDER — SODIUM CHLORIDE 9 MG/ML
1000 INJECTION, SOLUTION INTRAVENOUS CONTINUOUS
Status: DISCONTINUED | OUTPATIENT
Start: 2020-06-21 | End: 2020-06-21 | Stop reason: DRUGHIGH

## 2020-06-21 RX ORDER — ASPIRIN 81 MG/1
81 TABLET ORAL DAILY
Status: DISCONTINUED | OUTPATIENT
Start: 2020-06-22 | End: 2020-06-24 | Stop reason: HOSPADM

## 2020-06-21 RX ORDER — LOSARTAN POTASSIUM 100 MG/1
100 TABLET ORAL DAILY
Status: DISCONTINUED | OUTPATIENT
Start: 2020-06-22 | End: 2020-06-21

## 2020-06-21 RX ORDER — ONDANSETRON 4 MG/1
4 TABLET, ORALLY DISINTEGRATING ORAL EVERY 6 HOURS PRN
Status: DISCONTINUED | OUTPATIENT
Start: 2020-06-21 | End: 2020-06-24 | Stop reason: HOSPADM

## 2020-06-21 RX ORDER — LIDOCAINE 4 G/G
1 PATCH TOPICAL
Status: DISCONTINUED | OUTPATIENT
Start: 2020-06-21 | End: 2020-06-24 | Stop reason: HOSPADM

## 2020-06-21 RX ORDER — AMLODIPINE BESYLATE 5 MG/1
5 TABLET ORAL AT BEDTIME
Status: DISCONTINUED | OUTPATIENT
Start: 2020-06-21 | End: 2020-06-24 | Stop reason: HOSPADM

## 2020-06-21 RX ORDER — THYROID 15 MG/1
15 TABLET ORAL DAILY
Status: DISCONTINUED | OUTPATIENT
Start: 2020-06-22 | End: 2020-06-21

## 2020-06-21 RX ORDER — OXYCODONE HYDROCHLORIDE 5 MG/1
5 TABLET ORAL EVERY 6 HOURS PRN
Status: DISCONTINUED | OUTPATIENT
Start: 2020-06-21 | End: 2020-06-24 | Stop reason: HOSPADM

## 2020-06-21 RX ORDER — GABAPENTIN 300 MG/1
300 CAPSULE ORAL 2 TIMES DAILY
Status: DISCONTINUED | OUTPATIENT
Start: 2020-06-21 | End: 2020-06-24 | Stop reason: HOSPADM

## 2020-06-21 RX ORDER — ACETAMINOPHEN 650 MG/1
650 SUPPOSITORY RECTAL EVERY 4 HOURS PRN
Status: DISCONTINUED | OUTPATIENT
Start: 2020-06-21 | End: 2020-06-24 | Stop reason: HOSPADM

## 2020-06-21 RX ORDER — SODIUM CHLORIDE 9 MG/ML
INJECTION, SOLUTION INTRAVENOUS CONTINUOUS
Status: DISCONTINUED | OUTPATIENT
Start: 2020-06-21 | End: 2020-06-24

## 2020-06-21 RX ORDER — SIMVASTATIN 10 MG
10 TABLET ORAL AT BEDTIME
Status: DISCONTINUED | OUTPATIENT
Start: 2020-06-21 | End: 2020-06-24 | Stop reason: HOSPADM

## 2020-06-21 RX ORDER — FERROUS GLUCONATE 324(38)MG
324 TABLET ORAL
Status: DISCONTINUED | OUTPATIENT
Start: 2020-06-22 | End: 2020-06-24 | Stop reason: HOSPADM

## 2020-06-21 RX ORDER — VITAMIN B COMPLEX
50 TABLET ORAL DAILY
Status: DISCONTINUED | OUTPATIENT
Start: 2020-06-22 | End: 2020-06-24 | Stop reason: HOSPADM

## 2020-06-21 RX ADMIN — AMLODIPINE BESYLATE 5 MG: 5 TABLET ORAL at 22:30

## 2020-06-21 RX ADMIN — PANTOPRAZOLE SODIUM 40 MG: 40 INJECTION, POWDER, FOR SOLUTION INTRAVENOUS at 22:30

## 2020-06-21 RX ADMIN — SODIUM CHLORIDE 1000 ML: 9 INJECTION, SOLUTION INTRAVENOUS at 17:07

## 2020-06-21 RX ADMIN — ACETAMINOPHEN 650 MG: 325 TABLET, FILM COATED ORAL at 22:30

## 2020-06-21 RX ADMIN — Medication 1 MG: at 22:30

## 2020-06-21 RX ADMIN — FAMOTIDINE 20 MG: 20 INJECTION, SOLUTION INTRAVENOUS at 17:08

## 2020-06-21 RX ADMIN — LOSARTAN POTASSIUM 100 MG: 100 TABLET, FILM COATED ORAL at 22:30

## 2020-06-21 RX ADMIN — GABAPENTIN 300 MG: 300 CAPSULE ORAL at 22:30

## 2020-06-21 RX ADMIN — METOCLOPRAMIDE 10 MG: 5 INJECTION, SOLUTION INTRAMUSCULAR; INTRAVENOUS at 17:43

## 2020-06-21 RX ADMIN — SIMVASTATIN 10 MG: 10 TABLET, FILM COATED ORAL at 22:43

## 2020-06-21 RX ADMIN — LIDOCAINE 1 PATCH: 560 PATCH PERCUTANEOUS; TOPICAL; TRANSDERMAL at 22:31

## 2020-06-21 NOTE — ED PROVIDER NOTES
[facility] ([facility])  6/21/20    History     Chief Complaint   Patient presents with     Gastrophageal Reflux     Nausea & Vomiting     HPI  Junaid Gunter is a 70 year old female with a history of hypertension and chronic hepatitis C who presents to the Emergency Department for evaluation of recurrence abdominal and back pain along with new nausea vomiting today.Pain symptoms noted on a primary care appointment on 3/10/2020 abdominal pain was thought to be d/t GERD.  She was started on Pepcid at this time. Patient was evaluated here yesterday for the same symptoms. Patient had CT chest abdomen pelvis done, which showed small hiatal hernia and T7 compression deformity, full impressions below the HPI.  Patient's pain was managed with a lidocaine patch and 1 dose of IV Dilaudid while in the ED. Patient was discharged with a new prescription for gabapentin 300mg capsule by mouth BID, referral to sports medicine/spine, and primary care to reassess for pain management.    CT Chest Abdomen Pelvis Impressions (6/20/2020):  1. No evidence of thoracic aorta or abdominal aortic dissection. The  visualized proximal lower extremity arteries are patent without  atherosclerotic disease.  2. T7 compression deformity, no priors available to determine  chronicity. Correlate for focal tenderness.  3. Small hiatal hernia.  4. Prominent pretracheal lymph node, likely reactive. Attention on  follow-up.    I have reviewed the Medications, Allergies, Past Medical and Surgical History, and Social History in the [facility] system.  PAST MEDICAL HISTORY:   Past Medical History:   Diagnosis Date     DJD (degenerative joint disease)      HCV (hepatitis C virus)      Hypertension      Osteoporosis        PAST SURGICAL HISTORY: No past surgical history on file.    Past medical history, past surgical history, medications, and allergies were reviewed with the patient. Additional pertinent items:       FAMILY HISTORY:    Family History   Problem Relation Age of Onset     Depression Sister        SOCIAL HISTORY:   Social History     Tobacco Use     Smoking status: Never Smoker     Smokeless tobacco: Never Used   Substance Use Topics     Alcohol use: Never     Frequency: Never     Social history was reviewed with the patient. Additional pertinent items: None      Patient's Medications   New Prescriptions    No medications on file   Previous Medications    ACETAMINOPHEN EXTRA STRENGTH 500 MG TABLET        ASPIRIN (ASA) 81 MG EC TABLET    Take 1 tablet (81 mg) by mouth daily    DICLOFENAC (VOLTAREN) 1 % TOPICAL GEL        FAMOTIDINE (PEPCID) 20 MG TABLET    Take 1 tablet (20 mg) by mouth 2 times daily    FERROUS GLUCONATE (FERGON) 324 (38 FE) MG TABLET        FERROUS GLUCONATE 324 (37.5 FE) MG TABS        FLUZONE HIGH-DOSE 0.5 ML INJECTION        GABAPENTIN (NEURONTIN) 300 MG CAPSULE    Take 1 capsule (300 mg) by mouth 2 times daily    GLECAPREVIR-PIBRENTASVIR (MAVYRET) 100-40 MG PER TABLET    Take 3 tablets by mouth daily (with breakfast)    LOSARTAN (COZAAR) 100 MG TABLET        PREVNAR 13 SUSP INJECTION        SENNA-LAX 8.6 MG TABLET        SIMVASTATIN (ZOCOR) 10 MG TABLET    Take 1 tablet (10 mg) by mouth At Bedtime    THYROID (ARMOUR) 15 MG TABLET    Take 1 tablet (15 mg) by mouth daily    TIZANIDINE (ZANAFLEX) 4 MG TABLET    Take 1 tablet (4 mg) by mouth 2 times daily as needed for muscle spasms    VITAMIN D3 (CHOLECALCIFEROL) 2000 UNITS (50 MCG) TABLET       Modified Medications    No medications on file   Discontinued Medications    No medications on file          Allergies   Allergen Reactions     Egg [Chicken-Derived Products (Egg)] Itching        Review of Systems  A complete review of systems was performed with pertinent positives and negatives noted in the HPI, and all other systems negative.       Physical Exam   BP: (!) 181/77  Pulse: 74  SpO2: 99 %      Physical Exam  Constitutional:       General: She is not in acute  distress.     Appearance: She is not diaphoretic.   HENT:      Head: Atraumatic.      Mouth/Throat:      Pharynx: No oropharyngeal exudate.   Eyes:      General: No scleral icterus.     Pupils: Pupils are equal, round, and reactive to light.   Cardiovascular:      Heart sounds: Normal heart sounds.   Pulmonary:      Effort: No respiratory distress.      Breath sounds: Normal breath sounds.   Abdominal:      General: Bowel sounds are normal.      Palpations: Abdomen is soft.      Tenderness: There is no abdominal tenderness.   Musculoskeletal:         General: No tenderness.   Skin:     General: Skin is warm.      Findings: No rash.         ED Course   5 PM  The patient was seen and examined by Praveen Dangelo MD in Room ED18.        Procedures                           No results found for this or any previous visit (from the past 24 hour(s)).  Medications   0.9% sodium chloride BOLUS (has no administration in time range)     Followed by   sodium chloride 0.9% infusion (has no administration in time range)   metoclopramide (REGLAN) 10 mg in sodium chloride 0.9 % 50 mL infusion (has no administration in time range)   famotidine (PEPCID) infusion 20 mg (has no administration in time range)             Assessments & Plan (with Medical Decision Making)     70 year old female with a history of hypertension and chronic hepatitis C who presents to the Emergency Department for evaluation of recurrence abdominal and back pain along with new nausea vomiting today.  IV established, labs drawn sent reviewed document in epic essentially all unremarkable with exception of a sodium of 126 but otherwise normal electrolytes and CBC, lactic acid 0.8 and troponin undetectable. EKG obtained which revealed normal sinus rhythm without any acute ischemic changes.  Patient sent to ultrasound from his right upper quadrant which revealed no acute process.  Patient medicated with a liter normal saline, Reglan 10 mg IV piggyback, Pepcid 20 mg IV.   On repeat assessment patient symptoms only mildly improved.  Fluid swab obtained and pending at time of this dictation as well as UA.  Case discussed with ED observation provider with plan to place lab status until tolerating p.o. with consideration of possible placement or home health services.    I have reviewed the nursing notes.    I have reviewed the findings, diagnosis, plan and need for follow up with the patient.    New Prescriptions    No medications on file       Final diagnoses:   Nausea and vomiting, intractability of vomiting not specified, unspecified vomiting type       6/21/2020   [facility], EMERGENCY DEPARTMENT    IGallito, am serving as a trained medical scribe to document services personally performed by Praveen Dangelo MD, based on the provider's statements to me.     Praveen BRICENO MD, was physically present and have reviewed and verified the accuracy of this note documented by Gallito Christopher.       Praveen Dangelo MD  06/21/20 2372

## 2020-06-21 NOTE — ED NOTES
Bed: ED18  Expected date: 6/21/20  Expected time: 4:14 PM  Means of arrival: Ambulance  Comments:  Ben Carnes    69 y/o Female    Vomiting    Triaged:  YELLOW

## 2020-06-22 ENCOUNTER — APPOINTMENT (OUTPATIENT)
Dept: INTERPRETER SERVICES | Facility: CLINIC | Age: 70
End: 2020-06-22
Payer: COMMERCIAL

## 2020-06-22 LAB
ALBUMIN SERPL-MCNC: 2.7 G/DL (ref 3.4–5)
ALP SERPL-CCNC: 60 U/L (ref 40–150)
ALT SERPL W P-5'-P-CCNC: 19 U/L (ref 0–50)
ANION GAP SERPL CALCULATED.3IONS-SCNC: 6 MMOL/L (ref 3–14)
AST SERPL W P-5'-P-CCNC: 21 U/L (ref 0–45)
BASOPHILS # BLD AUTO: 0 10E9/L (ref 0–0.2)
BASOPHILS NFR BLD AUTO: 0.2 %
BILIRUB SERPL-MCNC: 0.4 MG/DL (ref 0.2–1.3)
BUN SERPL-MCNC: 11 MG/DL (ref 7–30)
C COLI+JEJUNI+LARI FUSA STL QL NAA+PROBE: NOT DETECTED
C DIFF TOX B STL QL: NEGATIVE
CALCIUM SERPL-MCNC: 8 MG/DL (ref 8.5–10.1)
CHLORIDE SERPL-SCNC: 108 MMOL/L (ref 94–109)
CO2 SERPL-SCNC: 24 MMOL/L (ref 20–32)
CREAT SERPL-MCNC: 0.98 MG/DL (ref 0.52–1.04)
DIFFERENTIAL METHOD BLD: ABNORMAL
EC STX1 GENE STL QL NAA+PROBE: NOT DETECTED
EC STX2 GENE STL QL NAA+PROBE: NOT DETECTED
ENTERIC PATHOGEN COMMENT: NORMAL
EOSINOPHIL # BLD AUTO: 0 10E9/L (ref 0–0.7)
EOSINOPHIL NFR BLD AUTO: 0.2 %
ERYTHROCYTE [DISTWIDTH] IN BLOOD BY AUTOMATED COUNT: 13.8 % (ref 10–15)
GFR SERPL CREATININE-BSD FRML MDRD: 59 ML/MIN/{1.73_M2}
GLUCOSE SERPL-MCNC: 89 MG/DL (ref 70–99)
HCT VFR BLD AUTO: 29 % (ref 35–47)
HGB BLD-MCNC: 9.5 G/DL (ref 11.7–15.7)
IMM GRANULOCYTES # BLD: 0.1 10E9/L (ref 0–0.4)
IMM GRANULOCYTES NFR BLD: 1.2 %
INTERPRETATION ECG - MUSE: NORMAL
LYMPHOCYTES # BLD AUTO: 1.3 10E9/L (ref 0.8–5.3)
LYMPHOCYTES NFR BLD AUTO: 31.1 %
MCH RBC QN AUTO: 31.9 PG (ref 26.5–33)
MCHC RBC AUTO-ENTMCNC: 32.8 G/DL (ref 31.5–36.5)
MCV RBC AUTO: 97 FL (ref 78–100)
MONOCYTES # BLD AUTO: 0.4 10E9/L (ref 0–1.3)
MONOCYTES NFR BLD AUTO: 9.2 %
NEUTROPHILS # BLD AUTO: 2.3 10E9/L (ref 1.6–8.3)
NEUTROPHILS NFR BLD AUTO: 58.1 %
NOROV GI+II ORF1-ORF2 JNC STL QL NAA+PR: NOT DETECTED
NRBC # BLD AUTO: 0 10*3/UL
NRBC BLD AUTO-RTO: 0 /100
PLATELET # BLD AUTO: 116 10E9/L (ref 150–450)
POTASSIUM SERPL-SCNC: 4 MMOL/L (ref 3.4–5.3)
PROT SERPL-MCNC: 6.6 G/DL (ref 6.8–8.8)
RBC # BLD AUTO: 2.98 10E12/L (ref 3.8–5.2)
RVA NSP5 STL QL NAA+PROBE: NOT DETECTED
SALMONELLA SP RPOD STL QL NAA+PROBE: NOT DETECTED
SHIGELLA SP+EIEC IPAH STL QL NAA+PROBE: NOT DETECTED
SODIUM SERPL-SCNC: 137 MMOL/L (ref 133–144)
SPECIMEN SOURCE: NORMAL
V CHOL+PARA RFBL+TRKH+TNAA STL QL NAA+PR: NOT DETECTED
WBC # BLD AUTO: 4 10E9/L (ref 4–11)
Y ENTERO RECN STL QL NAA+PROBE: NOT DETECTED

## 2020-06-22 PROCEDURE — 25000132 ZZH RX MED GY IP 250 OP 250 PS 637: Performed by: NURSE PRACTITIONER

## 2020-06-22 PROCEDURE — 36415 COLL VENOUS BLD VENIPUNCTURE: CPT | Performed by: NURSE PRACTITIONER

## 2020-06-22 PROCEDURE — 96376 TX/PRO/DX INJ SAME DRUG ADON: CPT

## 2020-06-22 PROCEDURE — 25000128 H RX IP 250 OP 636: Performed by: NURSE PRACTITIONER

## 2020-06-22 PROCEDURE — 87338 HPYLORI STOOL AG IA: CPT | Performed by: NURSE PRACTITIONER

## 2020-06-22 PROCEDURE — 96361 HYDRATE IV INFUSION ADD-ON: CPT

## 2020-06-22 PROCEDURE — 99224 ZZC SUBSEQUENT OBSERVATION CARE,LEVEL I: CPT | Mod: Z6 | Performed by: NURSE PRACTITIONER

## 2020-06-22 PROCEDURE — 25800030 ZZH RX IP 258 OP 636: Performed by: NURSE PRACTITIONER

## 2020-06-22 PROCEDURE — 85025 COMPLETE CBC W/AUTO DIFF WBC: CPT | Performed by: NURSE PRACTITIONER

## 2020-06-22 PROCEDURE — 80053 COMPREHEN METABOLIC PANEL: CPT | Performed by: NURSE PRACTITIONER

## 2020-06-22 PROCEDURE — G0378 HOSPITAL OBSERVATION PER HR: HCPCS

## 2020-06-22 PROCEDURE — C9113 INJ PANTOPRAZOLE SODIUM, VIA: HCPCS | Performed by: NURSE PRACTITIONER

## 2020-06-22 PROCEDURE — 87493 C DIFF AMPLIFIED PROBE: CPT | Performed by: NURSE PRACTITIONER

## 2020-06-22 PROCEDURE — L3650 SO 8 ABD RESTRAINT PRE OTS: HCPCS

## 2020-06-22 PROCEDURE — 87506 IADNA-DNA/RNA PROBE TQ 6-11: CPT | Performed by: NURSE PRACTITIONER

## 2020-06-22 RX ADMIN — AMLODIPINE BESYLATE 5 MG: 5 TABLET ORAL at 22:18

## 2020-06-22 RX ADMIN — SIMVASTATIN 10 MG: 10 TABLET, FILM COATED ORAL at 22:18

## 2020-06-22 RX ADMIN — PANTOPRAZOLE SODIUM 40 MG: 40 INJECTION, POWDER, FOR SOLUTION INTRAVENOUS at 22:16

## 2020-06-22 RX ADMIN — LIDOCAINE 1 PATCH: 560 PATCH PERCUTANEOUS; TOPICAL; TRANSDERMAL at 22:21

## 2020-06-22 RX ADMIN — GABAPENTIN 300 MG: 300 CAPSULE ORAL at 08:36

## 2020-06-22 RX ADMIN — FERROUS GLUCONATE 324 MG: 324 TABLET ORAL at 08:35

## 2020-06-22 RX ADMIN — OXYCODONE HYDROCHLORIDE 5 MG: 5 TABLET ORAL at 08:35

## 2020-06-22 RX ADMIN — ASPIRIN 81 MG: 81 TABLET, FILM COATED ORAL at 08:36

## 2020-06-22 RX ADMIN — ACETAMINOPHEN 650 MG: 325 TABLET, FILM COATED ORAL at 08:35

## 2020-06-22 RX ADMIN — LOSARTAN POTASSIUM 100 MG: 100 TABLET, FILM COATED ORAL at 22:18

## 2020-06-22 RX ADMIN — GABAPENTIN 300 MG: 300 CAPSULE ORAL at 22:11

## 2020-06-22 RX ADMIN — MELATONIN 50 MCG: at 08:35

## 2020-06-22 RX ADMIN — LEVOTHYROXINE SODIUM 12.5 MCG: 25 TABLET ORAL at 08:36

## 2020-06-22 RX ADMIN — PANTOPRAZOLE SODIUM 40 MG: 40 INJECTION, POWDER, FOR SOLUTION INTRAVENOUS at 08:36

## 2020-06-22 RX ADMIN — ONDANSETRON 4 MG: 4 TABLET, ORALLY DISINTEGRATING ORAL at 10:10

## 2020-06-22 RX ADMIN — SODIUM CHLORIDE: 9 INJECTION, SOLUTION INTRAVENOUS at 00:02

## 2020-06-22 NOTE — PROGRESS NOTES
Observation Goals:    Diagnostic tests and consults completed and resulted: Not met- stool sample needs to be collected  Vital signs normal or at patient baseline: Not met- HTN   Tolerating oral intake to maintain hydration: Met   Adequate pain control on oral analgesics: Met   Returns to baseline functional status: Not met

## 2020-06-22 NOTE — PLAN OF CARE
Observation Goals:     Diagnostic tests and consults completed and resulted: Met; results pending.  Vital signs normal or at patient baseline: HTN, but improved. MD aware.  Tolerating oral intake to maintain hydration: Met   Adequate pain control on oral analgesics: Met   Returns to baseline functional status: Met

## 2020-06-22 NOTE — PROGRESS NOTES
[facility] Progress Note - Emergency Department Observation Unit       Date of Admission:  6/21/2020  Assessment & Plan   Junaid Gunter is a 70 year old female with a PMH significant for history of hypertension, hypothyroidism , DJD, osteoporosis, and chronic hepatitis C who presented to the Emergency Department for evaluation of abdominal pain and back pain.      #Generalized abdominal pain   #Nausea and vomiting   #Diarrhea   #Hyponatremia: resolved  Patient reports having umbilical pain on and off since March of 2020. And for about a week it has been worse that she has not been able to eat food or take medications. Reports was able to eat porridge last night. Reports loose stools, which resolved overnight.No fever or leukocytosis. UA negative. COVID19 is negative. EKG obtained with normal sinus rhythm and no acute ischemic changes.  RUQ abdominal US w/o acute pathology. Patient reports partially treated recent stomach bacteria. EPASS and Cdiff negative   --75ml/hr of normal saline   --Diet as tolerated   --Antiemetics as needed  --40mg of PPI twice daily     #Acute on chronic back pain   #Degenerative joint disease   Patient has diffuse back pain that has been worsening in the past 2-3 months and now it is radiating to her legs which makes difficult to walk. Patient currently has 2 hours of PCA per day. Daughter would like this increased.  CT showed T7 compression fracture. She has occasional urine leak but not complete loss of control; and no bowel incontinence.She received Flexeril and Oxycodone last evening.  Neurosurgery consulted and recommended T/L MRI to rule out malignancy. Orthotics consult.Reports backpain improved overnight. PT/OT consulted and will see patient once MRI completed   --Continue with home Gabapentin   --Tylenol and Oxycodone as needed   - PT/OT consulted and will see patient once MRI completed   - SW consult  - Orthotics consult: figure  of 8 brace until follow-up in clinic  - Follow up in [facility] in 6 weeks      Chronic Medical Problems:  #Chronic hepatitis C  Patient thinks she supposed be on Mavyret but waiting for insurance approval.  -- consult   --Follow up with outpatient GI clinic      #Hypertension   --Continue with home Losartan and Amlodipine      #Hypothyroidism   --Continue with home Levothyroxine       #Osteoporosis   --Patient is on weekly 70mg of oral Alendronate      #Chronic iron deficiency anemia   --Continue with home oral iron      Consults: Neurosurgery for acute on chronic back pain   FEN: Oral and IV hydration   DVT prophylaxis: Anticipating short stay  Code Status: Full code  Disposition: To be determined          Disposition Plan   Expected discharge: Tomorrow, recommended to prior living arrangement/TCU once safe disposition plan/ TCU bed available.  Entered: PATITO Buitrago CNP 06/22/2020, 8:53 AM       The patient's care was discussed with the Attending Physician, Dr. Goins, Bedside Nurse, Care Coordinator/ and Patient.    PATITO Hester, NP  Emergency Department  [phone] Ex [phone]  ___________________________    Interval History   No events overnight     Data reviewed today: I reviewed all medications, new labs and imaging results over the last 24 hours.    Physical Exam   Vital Signs: Temp: 96.8  F (36  C) Temp src: Oral BP: (!) 142/57 Pulse: 68   Resp: 15 SpO2: 94 % O2 Device: None (Room air)    Weight: 0 lbs 0 oz  Gen:A&Ox3, comfortably resting in bed  HEENT:PERRL, no facial tenderness or wounds, head atraumatic, oropharynx clear, mucous membranes moist, TMs clear bilaterally  Neck:no bony tenderness or step offs, no JVD, trachea midline  Back: low cervical and mid thoracic back pain, no CVA tenderness bilaterally  CV:RRR without murmurs  PULM:Clear to auscultation bilaterally  Abd:soft, tender in the epigastrium, nondistended BS in all 4 quadrants.  UE:No traumatic  injuries, skin normal  LE:no traumatic injuries, cool feet bilaterally. Right foot + DP and PT pulses, left foot with decreased DP and normal PT pulse.   Neuro:CN II-XII intact, strength 5/5 throughout, sensation intact throughout. Gait stable.   Skin: no rashes or ecchymoses    Data   Recent Labs   Lab 06/22/20  0735 06/21/20  1704 06/21/20  1658 06/20/20  0821   WBC 4.0 5.2  --  5.8   HGB 9.5* 9.9*  --  10.0*   MCV 97 96  --  99   * 94*  --  119*   INR  --   --   --  1.04    126*  --  130*   POTASSIUM 4.0 4.3  --  4.1   CHLORIDE 108 94  --  99   CO2 24 25  --  27   BUN 11 11  --  19   CR 0.98 0.98  --  0.95   ANIONGAP 6 7  --  4   TACHO 8.0* 8.8  --  8.5   GLC 89 101*  --  96   ALBUMIN 2.7* 3.4  --  3.2*   PROTTOTAL 6.6* 7.4  --  7.4   BILITOTAL 0.4 0.4  --  0.3   ALKPHOS 60 72  --  82   ALT 19 23  --  20   AST 21 32  --  22   LIPASE  --  359  --  210   TROPONIN  --   --  0.00  --      Recent Results (from the past 24 hour(s))   Abdomen US, limited (RUQ only)    Narrative    Ultrasound abdominal right upper quadrant, 6/21/2020 6:11 PM.    Comparison: Chest abdomen and pelvis pelvis CT 6/20/2020..    History: abd pain, ? Biliarty ract disease.    Findings:    There is no ascites.     Liver: The liver measures 14.8 cm in greatest cranial caudal  dimension. The liver parenchyma has normal echotexture and  echogenicity. There are no focal masses. There are no portal-systemic  collaterals identified. There is no intrahepatic biliary dilatation.    Gallbladder: There was a negative sonographic Funk's sign. The  gallbladder lumen is anechoic and not distended. The wall is not  thickened. The extrahepatic bile duct measures 4 mm. There is no  extrahepatic biliary dilatation. There is no pericholecystic fluid.    Pancreas: The visualized pancreas is unremarkable. Unable to visualize  portions of the pancreatic tail due to overlying bowel gas.     Right Kidney: There is no hydronephrosis or hydroureter. There  are no  shadowing renal calculi, cystic lesion or mass. The right kidney  measures 9.2 cm long. The capsule and parenchyma have normal  echotexture.       Impression    Impression:   No sonographic evidence for acute cholecystitis. No intra or  extrahepatic biliary ductal dilatation.        I have personally reviewed the examination and initial interpretation  and I agree with the findings.    LEFTY PAPPAS MD     Medications     sodium chloride 75 mL/hr at 06/22/20 0002       amLODIPine  5 mg Oral At Bedtime     aspirin  81 mg Oral Daily     ferrous gluconate  324 mg Oral Daily with breakfast     gabapentin  300 mg Oral BID     levothyroxine  12.5 mcg Oral QAM AC     lidocaine  1 patch Transdermal Q24h    And     lidocaine   Transdermal Q8H     losartan  100 mg Oral At Bedtime     pantoprazole (PROTONIX) IV  40 mg Intravenous BID     polyethylene glycol  17 g Oral Once     simvastatin  10 mg Oral At Bedtime     vitamin D3  50 mcg Oral Daily

## 2020-06-22 NOTE — CONSULTS
"Social Work Services Progress Note    Hospital Day: 2  Collaborated with:  Chart review    Data:  Pt is a 70-year-old female admitted to [facility] 6/21/20 for evaluation of abdominal pain and back pain.     Intervention:  SW consult received for \"discharge planning.\" PT/OT not ordered to eval for discharge recommendations. SW to follow if PT/OT to be ordered and recommend TCU placement. If pt requires home care services at d/c RNCC to follow.    Assessment:  No SW discharge needs identified at this time    Plan:    Anticipated Disposition:  Home, no needs identified    Barriers to d/c plan:  Medical stability    Follow Up:  SW to follow and assist with discharge planning if SW needs identified    LASHAY Iqbal, [facility]    [facility]  Pager [phone]  Phone [phone]        "

## 2020-06-22 NOTE — PLAN OF CARE
Time: 2333-3247   Admit Dx: Nausea and vomiting    Neuro: A&Ox4. Speech logical and clear. PERRLA.  Cardiac: HTN. Gave antihypertensive medications.  Respiratory: Clear throughout. SpO2 100% on RA. Denies cough.  GI/: Voids frequently and reports flank pain. Reports diarrhea, LBM: 20 . Declined miralax.  Skin: Declined full skin assessment. Visible skin CDI.  Activity: ISBA w/walker.  Pain: Reports abd pain at bellybutton that radiates to back. Gave tylenol, gabapentin, and applied lidocaine patch to back.  Diet: Reg diet, fair appetite. Reports nausea, but ate 100% of oatmeal.  Lines: R PIV SL.      Shift Changes: Afebrile and VSS on RA. NA: 126, Hb.9, EKG: NSR, Abd US: unremarkable.  Plan of Care: Need stool sample, enteric precautions added for C.Diff RO. U indio and culture ordered, need urine sample. Will continue to monitor.      Blood pressure (!) 175/70, pulse 79, temperature 98.7  F (37.1  C), temperature source Oral, resp. rate 18, SpO2 100 %.

## 2020-06-22 NOTE — PROGRESS NOTES
Care Coordinator Progress Note    Admission Date/Time:  6/21/2020  Attending MD:  Alan Goins MD    Data  Patient was admitted for: Nausea and vomiting, intractability of vomiting not specified, unspecified vomiting type.    Concerns with insurance coverage for discharge needs: None.  Current Living Situation: Patient lives alone.  Support System: Supportive and Uninvolved  Services Involved: PCA 2hrs per day  Transportation at Discharge: granddaughter reports her grandma has the number of who to call for a ride  Transportation to Medical Appointments:  - Name of caregiver: PCA  Barriers to Discharge: medical needs      Coordination of Care   D: Plan of care report received from Medical Team during Interdisciplinary Rounds Conference Call this morning.    I/A: Chart reviewed; spoke with patients grandloni Toledo she reports patient lives alone and is needing a lot of help. Paris would like patients PCA hours increased (has 2hrs per day right now). She did not have contact information for patients [facility], [facility] Mgr. Explained that increase of PCA hours is not within the hospital scope as that is a [facility] funded and [facility] run program. She then requested the doctor provide her with a letter that states patient needs increased PCA hours. Explained that evaluations by PT and OT need to be completed and if patient demonstrates those needs in the evaluation the doctor could do that.    Explained to Paris what a home care nurse and home therapies can do and how often they would come (once per week). Paris reported patient is agreeable to Home Care, no preference of agency. PT has been ordered, writer requested ED/OBS NP Traci please order OT as well.    Anticipate patient to dc tomorrow after MRI and brace fitting have been completed.    P: Care Coordination will continue to follow, please call or page should new needs arise.      Plan  Anticipated Discharge Date:  tomorrow  Anticipated Discharge Plan:  TBD by PT and  OT bibiana Lee RN, BSN, PHN  Kyler RN Care Coordinator  [facility]  Direct phone: [phone]  Pager: [phone]    To contact the on-call Weekend Care Coordination Team please page [phone] or job code [phone]

## 2020-06-22 NOTE — PLAN OF CARE
"Cancel, pt awaiting T spine MRI, per notes, \"CT shows T7 compression fracture. Due to punched-out appearance, would recommend contrast T-spine MRI to r/o fracture etiology. \"  Also, per neurosurgery notes,orthotist to issue figure 8 brace.    "

## 2020-06-22 NOTE — H&P
[facility] ED Observation Admission Note    Chief Complaint   Patient presents with     Gastrophageal Reflux     Nausea & Vomiting       Assessment/Plan:  Junaid Gunter is a 70 year old female with a PMH significant for history of hypertension, hypothyroidism , DJD, osteoporosis, and chronic hepatitis C who presented to the Emergency Department for evaluation of abdominal pain and back pain.     #Generalized abdominal pain   #Nausea and vomiting   #Diarrhea   #Hyponatremia   Patient reports having umbilical pain on and off since March of 2020. And for about a week it has been worse that she has not been able to eat food or take medications. She denies hematemesis, hematochezia or black stools. She also has frequent small liquid stools that occurs after cramps. No fever or leukocytosis. UA negative. Sodium of 126 but otherwise normal electrolytes and CBC, lactic acid 0.8 and troponin undetectable. COVID19 is negative. EKG obtained with normal sinus rhythm and no acute ischemic changes.  RUQ abdominal US w/o acute pathology. Patient reports partially treated recent stomach bacteria that might have been H. Pylori, she stopped antibiotics d/t stomach pain. Differential includes gastritis vs PUD. Possible untreated H. Pylori; and C. Diff infection as patient was recently on antibiotics for unknown stomach bacteria. Plan is symptom management and if patient not improving will consult with GI.   --75ml/hr of normal saline   --Diet as tolerated   --Antiemetics as needed  --40mg of PPI twice daily  --H. Pylori, enteric pathogens and C. Diff   --UA culture pending   --GI consult if patient is not tolerating oral intake      #Acute on chronic back pain   #Degenerative joint disease   Patient has diffuse back pain that has been worsening in the past 2-3 months and now it is radiating to her legs which makes difficult to walk. CT showed T7 compression fracture. She has occasional urine leak but not complete loss of control; and no  bowel incontinence. Plan is to have neurosurgery evaluate patient tomorrow.   --Consult with neurosurgery   --Lidoderm patch  --Continue with home Gabapentin   --Tylenol and Oxycodone as needed     Chronic Medical Problems:  #Chronic hepatitis C  Patient thinks she supposed be on Mavyret but waiting for insurance approval.  -- consult   --Follow up with outpatient GI clinic     #Hypertension   --Continue with home Losartan and Amlodipine     #Hypothyroidism   --Continue with home Levothyroxine      #Osteoporosis   --Patient is on weekly 70mg of oral Alendronate     #Chronic iron deficiency anemia   --Continue with home oral iron     Consults: Neurosurgery for acute on chronic back pain   FEN: Oral and IV hydration   DVT prophylaxis: Anticipating short stay  Code Status: Full code  Disposition: To be determined but she lives at home with PCA help     HPI:  Junaid Gunter is a 70 year old female with a PMH significant for history of hypertension, hypothyroidism, DJD, and chronic hepatitis C who presented to the Emergency Department for evaluation of abdominal pain and back pain.      Patient was evaluated here yesterday for the same symptoms. Patient had CT chest abdomen pelvis done, which showed small hiatal hernia and T7 compression deformity.  Patient's pain was managed with a lidocaine patch and 1 dose of IV Dilaudid while in the ED. Patient was discharged with a new prescription for gabapentin 300mg capsule by mouth BID, referral to sports medicine/spine, and primary care to reassess for pain management. Of note, she did have a primary care appointment on 3/10/2020 abdominal pain was thought to be d/t GERD.  She was started on Pepcid at this time. She has Levaquin., Protonix and Amoxicillin that has not been completed, patient reports this medications being part of stomach bacteria that she was diagnosed recently.     On admission to the observation unit the patient was stable.     History:    Past  Medical History:   Diagnosis Date     DJD (degenerative joint disease)      HCV (hepatitis C virus)      Hypertension      Osteoporosis        No past surgical history on file.    Family History   Problem Relation Age of Onset     Depression Sister        Social History     Socioeconomic History     Marital status:      Spouse name: Not on file     Number of children: Not on file     Years of education: Not on file     Highest education level: Not on file   Occupational History     Not on file   Social Needs     Financial resource strain: Not on file     Food insecurity     Worry: Not on file     Inability: Not on file     Transportation needs     Medical: Not on file     Non-medical: Not on file   Tobacco Use     Smoking status: Never Smoker     Smokeless tobacco: Never Used   Substance and Sexual Activity     Alcohol use: Never     Frequency: Never     Drug use: Never     Sexual activity: Not Currently   Lifestyle     Physical activity     Days per week: Not on file     Minutes per session: Not on file     Stress: Not on file   Relationships     Social connections     Talks on phone: Not on file     Gets together: Not on file     Attends [employer] service: Not on file     Active member of club or organization: Not on file     Attends meetings of clubs or organizations: Not on file     Relationship status: Not on file     Intimate partner violence     Fear of current or ex partner: Not on file     Emotionally abused: Not on file     Physically abused: Not on file     Forced sexual activity: Not on file   Other Topics Concern     Not on file   Social History Narrative     Not on file       No current facility-administered medications on file prior to encounter.   ACETAMINOPHEN EXTRA STRENGTH 500 MG tablet,   aspirin (ASA) 81 MG EC tablet, Take 1 tablet (81 mg) by mouth daily  diclofenac (VOLTAREN) 1 % topical gel,   famotidine (PEPCID) 20 MG tablet, Take 1 tablet (20 mg) by mouth 2 times daily  ferrous  gluconate (FERGON) 324 (38 Fe) MG tablet,   Ferrous Gluconate 324 (37.5 Fe) MG TABS,   FLUZONE HIGH-DOSE 0.5 ML injection,   gabapentin (NEURONTIN) 300 MG capsule, Take 1 capsule (300 mg) by mouth 2 times daily  glecaprevir-pibrentasvir (MAVYRET) 100-40 MG per tablet, Take 3 tablets by mouth daily (with breakfast) (Patient not taking: Reported on 3/10/2020)  losartan (COZAAR) 100 MG tablet,   PREVNAR 13 SUSP injection,   SENNA-LAX 8.6 MG tablet,   simvastatin (ZOCOR) 10 MG tablet, Take 1 tablet (10 mg) by mouth At Bedtime  thyroid (ARMOUR) 15 MG tablet, Take 1 tablet (15 mg) by mouth daily  tiZANidine (ZANAFLEX) 4 MG tablet, Take 1 tablet (4 mg) by mouth 2 times daily as needed for muscle spasms  vitamin D3 (CHOLECALCIFEROL) 2000 units (50 mcg) tablet,         Data:    Results for orders placed or performed during the hospital encounter of 06/21/20   Abdomen US, limited (RUQ only)     Status: None    Narrative    Ultrasound abdominal right upper quadrant, 6/21/2020 6:11 PM.    Comparison: Chest abdomen and pelvis pelvis CT 6/20/2020..    History: abd pain, ? Biliarty ract disease.    Findings:    There is no ascites.     Liver: The liver measures 14.8 cm in greatest cranial caudal  dimension. The liver parenchyma has normal echotexture and  echogenicity. There are no focal masses. There are no portal-systemic  collaterals identified. There is no intrahepatic biliary dilatation.    Gallbladder: There was a negative sonographic Funk's sign. The  gallbladder lumen is anechoic and not distended. The wall is not  thickened. The extrahepatic bile duct measures 4 mm. There is no  extrahepatic biliary dilatation. There is no pericholecystic fluid.    Pancreas: The visualized pancreas is unremarkable. Unable to visualize  portions of the pancreatic tail due to overlying bowel gas.     Right Kidney: There is no hydronephrosis or hydroureter. There are no  shadowing renal calculi, cystic lesion or mass. The right  kidney  measures 9.2 cm long. The capsule and parenchyma have normal  echotexture.       Impression    Impression:   No sonographic evidence for acute cholecystitis. No intra or  extrahepatic biliary ductal dilatation.        I have personally reviewed the examination and initial interpretation  and I agree with the findings.    LEFTY PAPPAS MD   CBC with platelets differential     Status: Abnormal   Result Value Ref Range    WBC 5.2 4.0 - 11.0 10e9/L    RBC Count 3.09 (L) 3.8 - 5.2 10e12/L    Hemoglobin 9.9 (L) 11.7 - 15.7 g/dL    Hematocrit 29.7 (L) 35.0 - 47.0 %    MCV 96 78 - 100 fl    MCH 32.0 26.5 - 33.0 pg    MCHC 33.3 31.5 - 36.5 g/dL    RDW 13.4 10.0 - 15.0 %    Platelet Count 94 (L) 150 - 450 10e9/L    Diff Method Manual Differential     % Neutrophils 75.0 %    % Lymphocytes 20.5 %    % Monocytes 4.5 %    % Eosinophils 0.0 %    % Basophils 0.0 %    Absolute Neutrophil 3.9 1.6 - 8.3 10e9/L    Absolute Lymphocytes 1.1 0.8 - 5.3 10e9/L    Absolute Monocytes 0.2 0.0 - 1.3 10e9/L    Absolute Eosinophils 0.0 0.0 - 0.7 10e9/L    Absolute Basophils 0.0 0.0 - 0.2 10e9/L    RBC Morphology Normal     Platelet Estimate Confirming automated cell count    Comprehensive metabolic panel     Status: Abnormal   Result Value Ref Range    Sodium 126 (L) 133 - 144 mmol/L    Potassium 4.3 3.4 - 5.3 mmol/L    Chloride 94 94 - 109 mmol/L    Carbon Dioxide 25 20 - 32 mmol/L    Anion Gap 7 3 - 14 mmol/L    Glucose 101 (H) 70 - 99 mg/dL    Urea Nitrogen 11 7 - 30 mg/dL    Creatinine 0.98 0.52 - 1.04 mg/dL    GFR Estimate 58 (L) >60 mL/min/[1.73_m2]    GFR Estimate If Black 68 >60 mL/min/[1.73_m2]    Calcium 8.8 8.5 - 10.1 mg/dL    Bilirubin Total 0.4 0.2 - 1.3 mg/dL    Albumin 3.4 3.4 - 5.0 g/dL    Protein Total 7.4 6.8 - 8.8 g/dL    Alkaline Phosphatase 72 40 - 150 U/L    ALT 23 0 - 50 U/L    AST 32 0 - 45 U/L   Lipase     Status: None   Result Value Ref Range    Lipase 359 73 - 393 U/L   Lactic acid whole blood     Status: None    Result Value Ref Range    Lactic Acid 0.8 0.7 - 2.0 mmol/L   UA reflex to Microscopic and Culture     Status: Abnormal    Specimen: Urine Midstream; Midstream Urine   Result Value Ref Range    Color Urine Light Yellow     Appearance Urine Clear     Glucose Urine Negative NEG^Negative mg/dL    Bilirubin Urine Negative NEG^Negative    Ketones Urine Negative NEG^Negative mg/dL    Specific Gravity Urine 1.007 1.003 - 1.035    Blood Urine Negative NEG^Negative    pH Urine 7.5 (H) 5.0 - 7.0 pH    Protein Albumin Urine Negative NEG^Negative mg/dL    Urobilinogen mg/dL Normal 0.0 - 2.0 mg/dL    Nitrite Urine Negative NEG^Negative    Leukocyte Esterase Urine Negative NEG^Negative    Source Midstream Urine    EKG 12-lead, tracing only     Status: None (Preliminary result)   Result Value Ref Range    Interpretation ECG Click View Image link to view waveform and result    Troponin POCT     Status: None   Result Value Ref Range    Troponin I 0.00 0.00 - 0.08 ug/L     ROS:    Review Of Systems  10 point ROS negative other than the symptoms noted in the HPI.    Exam:    Vitals:  B/P: 177/111, T: Data Unavailable, P: 69, R: Data Unavailable    Constitutional:       General: She is not in acute distress.     Appearance: She is not diaphoretic.   HENT:      Head: Atraumatic.      Mouth/Throat:      Pharynx: No oropharyngeal exudate.   Eyes:      General: No scleral icterus.     Pupils: Pupils are equal, round, and reactive to light.   Cardiovascular:      Heart sounds: Normal heart sounds.   Pulmonary:      Effort: No respiratory distress.      Breath sounds: Normal breath sounds.   Abdominal:      General: Bowel sounds are normal.      Palpations: Abdomen is soft.      Tenderness: There is no abdominal tenderness.   Musculoskeletal:         General: Neck, spinal and back tenderness throughout. Range of motion intact.   Skin:     General: Skin is warm.      Findings: No rash.     Signed:  PATITO Jain CNP (ED  Observation Moonl[facility])  ED Observation Unit   Phone: [phone]    June 21, 2020 at 7:25 PM

## 2020-06-22 NOTE — PLAN OF CARE
"Time: 0700 - 1530    Reason for admission/Dx:   Nausea and vomiting, intractability of vomiting not specified, unspecified vomiting type [R11.2]     BP (!) 140/56 (BP Location: Left arm)   Pulse 80   Temp 96.4  F (35.8  C) (Axillary)   Resp 18   SpO2 96%     Shift changes: HTN (improved today), otherwise VSS, on RA. SBA, Orthotics consulted for figure-8 brace. Liberian speaking. Pain managed with Oxycodone PRN. Tolerating regular diet, with minimal nausea.  Zofran ODT still given. Pt notes \"feeling much better\". Stool sample collected and sent. Voiding WDL. Family members updated on POC.     Plan: MRI Thoracic & Lumbar spine this afternoon/evening. PT/OT to f/u after for discharge reccs. Family inquiring about increased home care needs.     "

## 2020-06-22 NOTE — PLAN OF CARE
[id number]    BP (!) 142/57 (BP Location: Left arm)   Pulse 68   Temp 96.8  F (36  C) (Oral)   Resp 15   SpO2 94%     Pt admitted for abd pain & N/V. Enteric precautions maintained. Pt Djib[place]ian speaking- Ipad  used. Vs on RA- Hypertensive. Disoriented to time & place. Pt knew at hospital but did not know correct location. Pt unable to state correct date but was able to answer year. Up with SBA w/ walker. Currently denies pain & nausea. PIV- infusing NS @ 75 ml/hr. No BM overnight, pt complains of diarrhea. Voiding spontaneously, pt reports voiding frequently. Voided once overnight. Regular diet- pt able to tolerate oatmeal. Urine sample collected. Stool sample needs to be collected- currently refusing bowel medications.     Will continue to montior and follow POC.

## 2020-06-22 NOTE — CONSULTS
[facility]       NEUROSURGERY CONSULTATION NOTE    This consultation was requested by Dr. Bingham from the Medicine service.    Reason for Consultation: Patient w/back pain that is radiating to legs in the setting of T7 compression deformity on CT    HPI: Junaid Gunter is a 70 year old female with a PMH of HTN, chronic hep C, hypothyroidism, osteoporosis, with a 2 month history of non-specific back pain that radiates to her legs and umbilicus. She denies any weakness, loss of sphincter control, numbness/tingling. She denies any history of trauma. At this time, her back pain has resolved.       PAST MEDICAL HISTORY:   Past Medical History:   Diagnosis Date     DJD (degenerative joint disease)      HCV (hepatitis C virus)      Hypertension      Osteoporosis        PAST SURGICAL HISTORY: No past surgical history on file.    FAMILY HISTORY:   Family History   Problem Relation Age of Onset     Depression Sister        SOCIAL HISTORY:   Social History     Tobacco Use     Smoking status: Never Smoker     Smokeless tobacco: Never Used   Substance Use Topics     Alcohol use: Never     Frequency: Never       MEDICATIONS:  Medications Prior to Admission   Medication Sig Dispense Refill Last Dose     aspirin (ASA) 81 MG EC tablet Take 1 tablet (81 mg) by mouth daily 90 tablet 1 Past Week at Unknown time     ACETAMINOPHEN EXTRA STRENGTH 500 MG tablet         diclofenac (VOLTAREN) 1 % topical gel   3 Unknown at Unknown time     famotidine (PEPCID) 20 MG tablet Take 1 tablet (20 mg) by mouth 2 times daily 60 tablet 1 Unknown at Unknown time     ferrous gluconate (FERGON) 324 (38 Fe) MG tablet    Unknown at Unknown time     Ferrous Gluconate 324 (37.5 Fe) MG TABS    Unknown at Unknown time     FLUZONE HIGH-DOSE 0.5 ML injection    Unknown at Unknown time     gabapentin (NEURONTIN) 300 MG capsule Take 1 capsule (300 mg) by mouth 2 times daily 60 capsule 1 Unknown at Unknown  time     glecaprevir-pibrentasvir (MAVYRET) 100-40 MG per tablet Take 3 tablets by mouth daily (with breakfast) (Patient not taking: Reported on 3/10/2020) 84 tablet 1 Unknown at Unknown time     losartan (COZAAR) 100 MG tablet    Unknown at Unknown time     PREVNAR 13 SUSP injection    Unknown at Unknown time     SENNA-LAX 8.6 MG tablet    Unknown at Unknown time     simvastatin (ZOCOR) 10 MG tablet Take 1 tablet (10 mg) by mouth At Bedtime 90 tablet 1 Unknown at Unknown time     thyroid (ARMOUR) 15 MG tablet Take 1 tablet (15 mg) by mouth daily 30 tablet 1 Unknown at Unknown time     tiZANidine (ZANAFLEX) 4 MG tablet Take 1 tablet (4 mg) by mouth 2 times daily as needed for muscle spasms 30 tablet 1 Unknown at Unknown time     vitamin D3 (CHOLECALCIFEROL) 2000 units (50 mcg) tablet    Unknown at Unknown time       Allergies:  Allergies   Allergen Reactions     Egg [Chicken-Derived Products (Egg)] Itching       Physical exam:   Blood pressure (!) 146/66, pulse 76, temperature 96.5  F (35.8  C), temperature source Axillary, resp. rate 16, SpO2 95 %.    NEUROLOGIC:  -- Awake; Alert; oriented x 3  -- Follows commands briskly  -- Speech unable to assess content (Moroccan)  -- no gaze preference. No apparent hemineglect.  Cranial Nerves:  -- visual fields full to confrontation, PERRL 3-2mm bilat and brisk, extraocular movements intact  -- face symmetrical, tongue midline  -- sensory V1-V3 intact bilaterally  -- palate elevates symmetrically, uvula midline  -- hearing grossly intact bilat  -- Trapezii 5/5 strength bilat symmetric  -- Cerebellar: Finger nose finger without dysmetria, intact rapid alternating motions bilaterally    Motor:  Normal bulk / tone; no tremor, rigidity, or bradykinesia.  No muscle wasting or fasciculations  No Pronator Drift     Delt Bi Tri Hand Flexion/  Extension Iliopsoas Quadriceps Hamstrings Tibialis Anterior Gastroc    C5 C6 C7 C8/T1 L2 L3 L4-S1 L4 S1   R 5 5 5 5 5 5 5 5 5   L 5 5 5 5 5 5 5 5  5   Sensory:  intact to LT x 4 extremities     Reflexes:     Bi Tri BR Zeinab Pat Ach Bab    C5-6 C7-8 C6 UMN L2-4 S1 UMN   R 2+ 2+ 2+ Norm 2+ 2+ Norm   L 2+ 2+ 2+ Norm 2+ 2+ Norm     Gait: Wide base gait, chronic.       LABS:  Last Comprehensive Metabolic Panel:  Sodium   Date Value Ref Range Status   2020 137 133 - 144 mmol/L Final     Potassium   Date Value Ref Range Status   2020 4.0 3.4 - 5.3 mmol/L Final     Chloride   Date Value Ref Range Status   2020 108 94 - 109 mmol/L Final     Carbon Dioxide   Date Value Ref Range Status   2020 24 20 - 32 mmol/L Final     Anion Gap   Date Value Ref Range Status   2020 6 3 - 14 mmol/L Final     Glucose   Date Value Ref Range Status   2020 89 70 - 99 mg/dL Final     Urea Nitrogen   Date Value Ref Range Status   2020 11 7 - 30 mg/dL Final     Creatinine   Date Value Ref Range Status   2020 0.98 0.52 - 1.04 mg/dL Final     GFR Estimate   Date Value Ref Range Status   2020 59 (L) >60 mL/min/[1.73_m2] Final     Comment:     Non  GFR Calc  Starting 2018, serum creatinine based estimated GFR (eGFR) will be   calculated using the Chronic Kidney Disease Epidemiology Collaboration   (CKD-EPI) equation.       Calcium   Date Value Ref Range Status   2020 8.0 (L) 8.5 - 10.1 mg/dL Final     Lab Results   Component Value Date    WBC 4.0 2020     Lab Results   Component Value Date    RBC 2.98 2020     Lab Results   Component Value Date    HGB 9.5 2020     Lab Results   Component Value Date    HCT 29.0 2020     Lab Results   Component Value Date    MCV 97 2020     Lab Results   Component Value Date    MCH 31.9 2020     Lab Results   Component Value Date    MCHC 32.8 2020     Lab Results   Component Value Date    RDW 13.8 2020     Lab Results   Component Value Date     2020         IMAGIN/20 CTA: T7 compression fracture with 57% height loss,  possible punched-out lesion. No spinal canal narrowing.     ASSESSMENT:   Ms. Gunter is a 69 yo F with a PMH of HTN, hypothyroidism, chronic hepatitis C and osteoporosis. Has a history of 2-3 months of non-specific back pain and radiating leg pain, no neurological deficits. CT shows T7 compression fracture. Due to punched-out appearance, would recommend contrast T-spine MRI to r/o fracture etiology.       RECOMMENDATIONS:  No neurosurgical intervention indicated at this time   MRI w/ and w/o contrast T-spine   Pain control  Orthotics consult: figure of 8 brace until follow-up in clinic  Follow up in [facility] in 6 weeks     The patient was discussed with Dr. Zaragoza, neurosurgery chief resident, and Dr. Mitchell, neurosurgery staff, and they agree with the above.    Reanna Morrow MD  Neurosurgery Resident, PGY-1

## 2020-06-22 NOTE — UTILIZATION REVIEW
"Admission Status; Secondary Review Determination     Admission Date: 6/21/2020  4:14 PM       Under the authority of the Utilization Management Committee, the utilization review process indicated a secondary review on the above patient.  The review outcome is based on review of the medical records, discussions with staff, and applying clinical experience noted on the date of the review.          (x) Observation Status Appropriate - This patient does not meet hospital inpatient criteria and is placed in observation status. If this patient's primary payer is Medicare and was admitted as an inpatient, Condition Code 44 should be used and patient status changed to \"observation\".       RATIONALE FOR DETERMINATION      Brief clinical presentation, information copied from the chart, abbreviated and edited for relevant content:     Junaid Gunter is a 70 year old female with a PMH of HTN, chronic hep C, hypothyroidism, osteoporosis, with a 2 month history of non-specific back pain that radiates to her legs and umbilicus. She denies any weakness, loss of sphincter control, numbness/tingling. She denies any history of trauma. At this time, her back pain has resolved.     Patient is clinically improving and there is no clear indication to change the patient's status to inpatient. The severity of illness, intensity of cares provided, risk for adverse outcome, and expected LOS make the care appropriate for observation.       The information on this document is developed by the utilization review team in order for the business office to ensure compliance.  This only denotes the appropriateness of proper admission status and does not reflect the quality of care rendered.         The definitions of Inpatient Status and Observation Status used in making the determination above are those provided in the CMS Coverage Manual, Chapter 1 and Chapter 6, section 70.4.      Sincerely,     Eve Durham MD   Utilization Review/ Case " Management  [facility].

## 2020-06-22 NOTE — ED NOTES
[facility], [place]   ED Nurse to Floor Handoff     Junaid Gunter is a 70 year old female who speaks Spanish and lives with family members,  in a home  They arrived in the ED by ambulance from home    ED Chief Complaint: Gastrophageal Reflux and Nausea & Vomiting    ED Dx;   Final diagnoses:   Nausea and vomiting, intractability of vomiting not specified, unspecified vomiting type         Needed?: Yes    Allergies:   Allergies   Allergen Reactions     Egg [Chicken-Derived Products (Egg)] Itching   .  Past Medical Hx:   Past Medical History:   Diagnosis Date     DJD (degenerative joint disease)      HCV (hepatitis C virus)      Hypertension      Osteoporosis       Baseline Mental status: WDL  Current Mental Status changes: at basesline    Infection present or suspected this encounter: cultures pending (covid pending)  Sepsis suspected: No  Isolation type: Special Precautions-COVID-19     Activity level - Baseline/Home:  Independent  Activity Level - Current:   Stand with Assist    Bariatric equipment needed?: No    In the ED these meds were given:   Medications   0.9% sodium chloride BOLUS (1,000 mLs Intravenous New Bag 6/21/20 1707)     Followed by   sodium chloride 0.9% infusion (has no administration in time range)   metoclopramide (REGLAN) 10 mg in sodium chloride 0.9 % 50 mL infusion (10 mg Intravenous Given 6/21/20 1743)   famotidine (PEPCID) infusion 20 mg (0 mg Intravenous Stopped 6/21/20 1743)       Drips running?  No    Home pump  No    Current LDAs  Peripheral IV 06/21/20 Right Wrist (Active)   Site Assessment WDL 06/21/20 1703   Line Status Infusing 06/21/20 1703   Number of days: 0       Labs results:   Labs Ordered and Resulted from Time of ED Arrival Up to the Time of Departure from the ED   CBC WITH PLATELETS DIFFERENTIAL - Abnormal; Notable for the following components:       Result Value    RBC Count 3.09 (*)     Hemoglobin 9.9 (*)     Hematocrit 29.7 (*)      Platelet Count 94 (*)     All other components within normal limits   COMPREHENSIVE METABOLIC PANEL - Abnormal; Notable for the following components:    Sodium 126 (*)     Glucose 101 (*)     GFR Estimate 58 (*)     All other components within normal limits   LIPASE   LACTIC ACID WHOLE BLOOD   COVID-19 VIRUS (CORONAVIRUS) BY PCR   UA MACROSCOPIC WITH REFLEX TO MICRO AND CULTURE   PERIPHERAL IV CATHETER   ISTAT TROPONIN NURSING POCT   TROPONIN POCT       Imaging Studies:   Recent Results (from the past 24 hour(s))   Abdomen US, limited (RUQ only)    Impression    Impression:   No sonographic evidence for acute cholecystitis. No intra or  extrahepatic biliary ductal dilatation.         Recent vital signs:   BP (!) 177/111   Pulse 69   SpO2 96%     Jose Coma Scale Score: 15 (06/21/20 1749)       Cardiac Rhythm: Normal Sinus  Pt needs tele? No  Skin/wound Issues: None    Code Status: Full Code    Pain control: good    Nausea control: good    Abnormal labs/tests/findings requiring intervention: see results    Family present during ED course? No   Family Comments/Social Situation comments:     Tasks needing completion: None    Michale Rivers, RN  [phone] [facility]

## 2020-06-22 NOTE — PLAN OF CARE
"OT 5A: OT orders received and acknowledged. Per chart review, pt awaiting T spine MRI: \"CT shows T7 compression fracture. Due to punched-out appearance, would recommend contrast T-spine MRI to r/o fracture etiology.\"  Also, per neurosurgery notes, orthotist to issue figure 8 brace. Will hold OT evaluation until MRI completed and brace issue to ensure safety and maximize meaningful evaluation/treatment.  "

## 2020-06-22 NOTE — PROVIDER NOTIFICATION
Provider notified about pt NEGATIVE covid results- Provider ordered to discontinue precautions. Will remain on Enteric- awaiting stool & urine sample.

## 2020-06-22 NOTE — PROGRESS NOTES
S: Order received to fit patient with a Figure 8 as ordered by Dr.Pena Morrow.  O/G: Support and stabilize clavicle  A:  Patient was fit with a universal clavicle splint.  The fit of the splint  is adequate.  P: contact orthotics if any issues arise.  CONCEPCION Mondragon.

## 2020-06-23 ENCOUNTER — APPOINTMENT (OUTPATIENT)
Dept: MRI IMAGING | Facility: CLINIC | Age: 70
End: 2020-06-23
Attending: NURSE PRACTITIONER
Payer: COMMERCIAL

## 2020-06-23 ENCOUNTER — APPOINTMENT (OUTPATIENT)
Dept: PHYSICAL THERAPY | Facility: CLINIC | Age: 70
End: 2020-06-23
Attending: NURSE PRACTITIONER
Payer: COMMERCIAL

## 2020-06-23 LAB
BACTERIA SPEC CULT: NO GROWTH
ERYTHROCYTE [DISTWIDTH] IN BLOOD BY AUTOMATED COUNT: 14 % (ref 10–15)
H PYLORI AG STL QL IA: POSITIVE
HCT VFR BLD AUTO: 30.7 % (ref 35–47)
HGB BLD-MCNC: 9.8 G/DL (ref 11.7–15.7)
Lab: NORMAL
MCH RBC QN AUTO: 31.9 PG (ref 26.5–33)
MCHC RBC AUTO-ENTMCNC: 31.9 G/DL (ref 31.5–36.5)
MCV RBC AUTO: 100 FL (ref 78–100)
PLATELET # BLD AUTO: 127 10E9/L (ref 150–450)
RBC # BLD AUTO: 3.07 10E12/L (ref 3.8–5.2)
SPECIMEN SOURCE: NORMAL
WBC # BLD AUTO: 6.3 10E9/L (ref 4–11)

## 2020-06-23 PROCEDURE — 25000128 H RX IP 250 OP 636: Performed by: NURSE PRACTITIONER

## 2020-06-23 PROCEDURE — 96376 TX/PRO/DX INJ SAME DRUG ADON: CPT

## 2020-06-23 PROCEDURE — 25500064 ZZH RX 255 OP 636: Performed by: EMERGENCY MEDICINE

## 2020-06-23 PROCEDURE — 25000132 ZZH RX MED GY IP 250 OP 250 PS 637: Performed by: PHYSICIAN ASSISTANT

## 2020-06-23 PROCEDURE — 96361 HYDRATE IV INFUSION ADD-ON: CPT

## 2020-06-23 PROCEDURE — 85027 COMPLETE CBC AUTOMATED: CPT | Performed by: NURSE PRACTITIONER

## 2020-06-23 PROCEDURE — G0378 HOSPITAL OBSERVATION PER HR: HCPCS

## 2020-06-23 PROCEDURE — 72157 MRI CHEST SPINE W/O & W/DYE: CPT

## 2020-06-23 PROCEDURE — 36415 COLL VENOUS BLD VENIPUNCTURE: CPT | Performed by: NURSE PRACTITIONER

## 2020-06-23 PROCEDURE — 72158 MRI LUMBAR SPINE W/O & W/DYE: CPT

## 2020-06-23 PROCEDURE — A9585 GADOBUTROL INJECTION: HCPCS | Performed by: EMERGENCY MEDICINE

## 2020-06-23 PROCEDURE — 97116 GAIT TRAINING THERAPY: CPT | Mod: GP | Performed by: REHABILITATION PRACTITIONER

## 2020-06-23 PROCEDURE — 97162 PT EVAL MOD COMPLEX 30 MIN: CPT | Mod: GP | Performed by: REHABILITATION PRACTITIONER

## 2020-06-23 PROCEDURE — 99224 ZZC SUBSEQUENT OBSERVATION CARE,LEVEL I: CPT | Mod: Z6 | Performed by: PHYSICIAN ASSISTANT

## 2020-06-23 PROCEDURE — 97530 THERAPEUTIC ACTIVITIES: CPT | Mod: GP | Performed by: REHABILITATION PRACTITIONER

## 2020-06-23 PROCEDURE — C9113 INJ PANTOPRAZOLE SODIUM, VIA: HCPCS | Performed by: NURSE PRACTITIONER

## 2020-06-23 PROCEDURE — 25000132 ZZH RX MED GY IP 250 OP 250 PS 637: Performed by: NURSE PRACTITIONER

## 2020-06-23 RX ORDER — GADOBUTROL 604.72 MG/ML
7.5 INJECTION INTRAVENOUS ONCE
Status: COMPLETED | OUTPATIENT
Start: 2020-06-23 | End: 2020-06-23

## 2020-06-23 RX ORDER — AMOXICILLIN 500 MG/1
1000 CAPSULE ORAL EVERY 12 HOURS SCHEDULED
Status: DISCONTINUED | OUTPATIENT
Start: 2020-06-23 | End: 2020-06-24 | Stop reason: HOSPADM

## 2020-06-23 RX ORDER — CLARITHROMYCIN 500 MG
500 TABLET ORAL EVERY 12 HOURS SCHEDULED
Status: DISCONTINUED | OUTPATIENT
Start: 2020-06-23 | End: 2020-06-24 | Stop reason: HOSPADM

## 2020-06-23 RX ADMIN — CLARITHROMYCIN 500 MG: 500 TABLET, FILM COATED ORAL at 19:58

## 2020-06-23 RX ADMIN — MELATONIN 50 MCG: at 08:45

## 2020-06-23 RX ADMIN — GADOBUTROL 7.5 ML: 604.72 INJECTION INTRAVENOUS at 12:58

## 2020-06-23 RX ADMIN — LEVOTHYROXINE SODIUM 12.5 MCG: 25 TABLET ORAL at 08:46

## 2020-06-23 RX ADMIN — ACETAMINOPHEN 650 MG: 325 TABLET, FILM COATED ORAL at 01:51

## 2020-06-23 RX ADMIN — LOSARTAN POTASSIUM 100 MG: 100 TABLET, FILM COATED ORAL at 21:02

## 2020-06-23 RX ADMIN — AMOXICILLIN 1000 MG: 500 CAPSULE ORAL at 19:58

## 2020-06-23 RX ADMIN — PANTOPRAZOLE SODIUM 40 MG: 40 INJECTION, POWDER, FOR SOLUTION INTRAVENOUS at 08:45

## 2020-06-23 RX ADMIN — GABAPENTIN 300 MG: 300 CAPSULE ORAL at 19:58

## 2020-06-23 RX ADMIN — AMLODIPINE BESYLATE 5 MG: 5 TABLET ORAL at 21:02

## 2020-06-23 RX ADMIN — OXYCODONE HYDROCHLORIDE 5 MG: 5 TABLET ORAL at 21:02

## 2020-06-23 RX ADMIN — ACETAMINOPHEN 650 MG: 325 TABLET, FILM COATED ORAL at 21:02

## 2020-06-23 RX ADMIN — FERROUS GLUCONATE 324 MG: 324 TABLET ORAL at 08:45

## 2020-06-23 RX ADMIN — PANTOPRAZOLE SODIUM 40 MG: 40 INJECTION, POWDER, FOR SOLUTION INTRAVENOUS at 19:58

## 2020-06-23 RX ADMIN — ASPIRIN 81 MG: 81 TABLET, FILM COATED ORAL at 08:45

## 2020-06-23 RX ADMIN — ACETAMINOPHEN 650 MG: 325 TABLET, FILM COATED ORAL at 11:55

## 2020-06-23 RX ADMIN — GABAPENTIN 300 MG: 300 CAPSULE ORAL at 08:45

## 2020-06-23 RX ADMIN — OXYCODONE HYDROCHLORIDE 5 MG: 5 TABLET ORAL at 11:55

## 2020-06-23 RX ADMIN — Medication 1 MG: at 22:16

## 2020-06-23 NOTE — PLAN OF CARE
OT/5A: Defer- Pt seen by PT and discharge recommendations have been established. Per discussion with PT, pt has concern regarding medication administration and CC/SW assisting to address this concern. OT eval not indicated with Pt on OBS status. Will defer OT evaluation at this time. If patient admission status changes to inpatient or OT eval is needed to facilitate safe discharge, please re-consult      Discharge Planner OT   Patient plan for discharge: TCU if insurance covered, otherwise home with PT/OT  Current status: See above   Barriers to return to prior living situation: medical   Recommendations for discharge: Defer to PT note  Rationale for recommendations: formal OT eval not completed.        Entered by: Sary Moran 06/23/2020 2:39 PM

## 2020-06-23 NOTE — PLAN OF CARE
"PT OBS: cancel, Cancel, pt awaiting T spine MRI, per notes, \"CT shows T7 compression fracture. Due to punched-out appearance, would recommend contrast T-spine MRI to r/o fracture etiology. \"  Also, per neurosurgery notes,orthotist to issue figure 8 brace.    "

## 2020-06-23 NOTE — PROGRESS NOTES
Social Work: Assessment with Discharge Plan    Patient Name:  Junaid Gunter  :  1950  Age:  70 year old  MRN:  [id number]  Completed assessment with:  Patient    Presenting Information   Reason for Referral:  Discharge plan  Date of Intake:  2020  Referral Source:  Chart Review  Decision Maker:  Patient  Alternate Decision Maker:  Per hospital policy, NOK  Health Care Directive:  None on file  Living Situation:  Apartment  Previous Functional Status:  PCA services 2 hours/day  Patient and family understanding of hospitalization:  Not discussed today  Cultural/Language/Spiritual Considerations:  Pt is Ghanaian-speaking  Adjustment to Illness:  Appropriate    Physical Health  Reason for Admission:    1. Nausea and vomiting, intractability of vomiting not specified, unspecified vomiting type      Services Needed/Recommended:  TCU    Mental Health/Chemical Dependency  Diagnosis:  None identified  Support/Services in Place:  N/A  Services Needed/Recommended:  N/A    Support System  Significant relationship at present time:  None identified  Family of origin is available for support:  Yes, pt mentions several children/grandchildren during discussion  Other support available:  PCA services  Gaps in support system:  Pt lives alone, family feels PCA hours need to be increased  Patient is caregiver to:  None     Provider Information   Primary Care Physician:  Todd Han   [phone]   Clinic:  [place] [address] [address] [facility] [address]      :  Unknown    Financial   Income Source:  Not discussed  Financial Concerns:  None identified  Insurance:    Payor/Plan Subscriber Name Rel Member # Group #   UCARE - UCARE [place] JUNAID GUNTER [facility] [id number] [facility] [place]      [address]       Discharge Plan   Patient and family discharge goal:  TCU  Provided education on discharge plan:  YES  Patient agreeable to discharge plan:  YES  A list of Medicare Certified Facilities with associated star  ratings was offered to the patient and/or family to encourage patient choice. Patient's choices for facility are:  Discussed with pt through , but Medicare.gov list in English. Discussed facilities by name with ratings but pt not familiar with any of the facilities. Pt agreeable with her granddaughter Paris choosing facilities and referrals. Spoke with Sadti- again read names of facilities but Rosmeryti not familiar with facilities either. Paris states preference is for TCU close to pt's home.   Will NH provide Skilled rehabilitation or complex medical:  YES  General information regarding anticipated insurance coverage and possible out of pocket cost was discussed. Patient and patient's family are aware patient may incur the cost of transportation to the facility, pending insurance payment: YES  Barriers to discharge:  Placement    Discharge Recommendations   Anticipated Disposition:  Facility:  TCU  Transportation Needs:  Medical:  Wheelchair  Name of Transportation Company and Phone:  [employer] ( [phone])    Additional comments   Pt is a 70-year-old female admitted to [facility] 6/21/20 for evaluation of abdominal pain and back pain. TCU is recommended at discharge. Spoke to pt with Todd . Pt agreeable with TCU placement but has no facility preferences. Pt agreeable with SW speaking with her granddaughter for referral options. Spoke with pt's granddaughter Jalen. Read off names of facilities close to home. Darrylti agreeable with referrals and states preference is to be near home.     Sent referrals to:  1) [facility] (ph [phone])  2) [facility] (ph [phone])  3)  TCU    LASHAY Iqbal, [facility] HospitalSW    [facility]  Pager [phone]  Phone [phone]

## 2020-06-23 NOTE — PROGRESS NOTES
Observation Goals:     Diagnostic tests and consults completed and resulted: Not met  Vital signs normal or at patient baseline: Met  Tolerating oral intake to maintain hydration: Met   Adequate pain control on oral analgesics: Met   Returns to baseline functional status: Met

## 2020-06-23 NOTE — PROGRESS NOTES
06/23/20 1100   Quick Adds   Type of Visit Initial PT Evaluation   Living Environment   Lives With alone   Living Arrangements apartment   Home Accessibility no concerns   Living Environment Comment has a PCA and a neighbor who helps out.   Self-Care   Usual Activity Tolerance moderate   Current Activity Tolerance fair   Regular Exercise No   Equipment Currently Used at Home cane, straight;shower chair   Activity/Exercise/Self-Care Comment reports occasionally does seated LE ex   Functional Level Prior   Ambulation 1-->assistive equipment   Transferring 1-->assistive equipment   Toileting 1-->assistive equipment   Bathing 3-->assistive equipment and person   Communication 0-->understands/communicates without difficulty   Cognition 0 - no cognition issues reported   Fall history within last six months no   Which of the above functional risks had a recent onset or change? ambulation;transferring   General Information   Onset of Illness/Injury or Date of Surgery - Date 06/21/20   Referring Physician Traci Lee, PATITO CNP    Patient/Family Goals Statement return home, get 4WW   Pertinent History of Current Problem (include personal factors and/or comorbidities that impact the POC)  70 year old female with a PMH significant for history of hypertension, hypothyroidism , DJD, osteoporosis, and chronic hepatitis C who presented to the Emergency Department for evaluation of abdominal pain and back pain.    Precautions/Limitations fall precautions;spinal precautions   General Info Comments PCA scheduled 2.5 hrs daily & PCA cooks, cleans, grocery shops, moves items and A with shower.  Pt would like medication set-up.   Cognitive Status Examination   Orientation orientation to person, place and time   Level of Consciousness alert   Follows Commands and Answers Questions 100% of the time   Personal Safety and Judgment intact   Memory intact   Pain Assessment   Patient Currently in Pain No   Integumentary/Edema  "  Integumentary/Edema no deficits were identifed   Integumentary/Edema Comments BUE and LE WFL   Posture    Posture Forward head position;Protracted shoulders;Kyphosis   Posture Comments mild in standing   Range of Motion (ROM)   ROM Quick Adds No deficits were identified   ROM Comment BLE   Strength   Strength Comments BLE ~ 4+/5   Bed Mobility   Bed Mobility Comments sup <> sit with SBA, cues for logroll.   Transfer Skills   Transfer Comments sit <> stand with SBA   Gait   Gait Comments Pt ambulates 20 ft in rm with fwd flexed posture, frequent furniture surfing.   Balance   Balance Comments CGA for dynamic standing balance   Sensory Examination   Sensory Perception Comments Pt reports B LE numbness/burning radiating from low back down LEs, L worse than R.   Muscle Tone   Muscle Tone no deficits were identified   Muscle Tone Comments BLE   General Therapy Interventions   Planned Therapy Interventions gait training;transfer training;balance training;bed mobility training;neuromuscular re-education;strengthening;stretching;risk factor education;home program guidelines;progressive activity/exercise   Clinical Impression   Criteria for Skilled Therapeutic Intervention yes, treatment indicated   PT Diagnosis impaired functional mobility   Influenced by the following impairments decreased balance, strength, bone integrity, endurance, posture   Functional limitations due to impairments Up to close SBA for transfers, gait with FWW   Clinical Presentation Evolving/Changing   Clinical Presentation Rationale PMH and clinical judgment   Clinical Decision Making (Complexity) Moderate complexity   Therapy Frequency 3x/week   Predicted Duration of Therapy Intervention (days/wks) 3 wks   Anticipated Discharge Disposition Transitional Care Facility   Risk & Benefits of therapy have been explained Yes   Patient, Family & other staff in agreement with plan of care Yes   [facility] AM-PAC TM \"6 Clicks\"   2016, Trustees of " "[facility], under license to [employer].  All rights reserved.   6 Clicks Short Forms Basic Mobility Inpatient Short Form   [facility] AM-PAC  \"6 Clicks\" V.2 Basic Mobility Inpatient Short Form   1. Turning from your back to your side while in a flat bed without using bedrails? 3 - A Little   2. Moving from lying on your back to sitting on the side of a flat bed without using bedrails? 3 - A Little   3. Moving to and from a bed to a chair (including a wheelchair)? 3 - A Little   4. Standing up from a chair using your arms (e.g., wheelchair, or bedside chair)? 3 - A Little   5. To walk in hospital room? 3 - A Little   6. Climbing 3-5 steps with a railing? 2 - A Lot   Basic Mobility Raw Score (Score out of 24.Lower scores equate to lower levels of function) 17   Total Evaluation Time   Total Evaluation Time (Minutes) 5     "

## 2020-06-23 NOTE — PROGRESS NOTES
ED OBSERVATION PROGRESS NOTE:  S: Junaid Gunter is a 70 year old female with a PMH significant for hypertension, hypothyroidism , DJD, osteoporosis, and chronic hepatitis C who presented to the Emergency Department for evaluation of abdominal pain and back pain.     Chief Complaint   Patient presents with     Gastrophageal Reflux     Nausea & Vomiting     1. Nausea and vomiting, intractability of vomiting not specified, unspecified vomiting type        Problem List:  Patient Active Problem List   Diagnosis     Pain of left side of body     Hepatitis C, chronic (H)     Peripheral neuropathy     Hypothyroidism     Essential hypertension     Major depressive disorder with single episode, in remission (H)     Anemia     Thrombocytopenia (H)     Atherosclerosis of aorta (H)     DDD (degenerative disc disease), cervical     Menopause     Gastroesophageal reflux disease, esophagitis presence not specified     Neck pain     Abdominal pain       MEDS:   No current outpatient medications on file.       ALLERGIES:    Allergies   Allergen Reactions     Egg [Chicken-Derived Products (Egg)] Itching       O:BP (!) 153/45 (BP Location: Left arm)   Pulse 80   Temp 97.8  F (36.6  C) (Oral)   Resp 18   SpO2 98%     Physical Exam   Constitutional: Pt is oriented to person, place, and time.Pt appears well-developed and well-nourished.   HENT:   Head: Normocephalic and atraumatic.   Eyes: Conjunctivae are normal. Pupils are equal, round, and reactive to light.   Neck: Normal range of motion. Neck supple.   Cardiovascular: Normal rate, regular rhythm, normal heart sounds and intact distal pulses.    Pulmonary/Chest: Effort normal and breath sounds normal. No respiratory distress. Pt has no wheezes. Pt has no rales  Abdominal: Soft. Bowel sounds are normal. Pt exhibits no distension and no mass. No tenderness. Pt has no rebound and no guarding.   Musculoskeletal: Normal range of motion. Pt exhibits no edema.   Neurological: Pt is  alert and oriented to person, place, and time. Normal reflexes.   Skin: Skin is warm and dry. No rash noted.   Psychiatric: Pt has a normal mood and affect. Behavior is normal. Judgment and thought content normal.     Assessment & Plan     Junaid Gunter is a 70 year old female with a PMH significant for hypertension, hypothyroidism , DJD, osteoporosis, and chronic hepatitis C who presented to the Emergency Department for evaluation of abdominal pain and back pain.      #Generalized abdominal pain   #Nausea and vomiting   #Diarrhea   #Hyponatremia: resolved  Patient reports having umbilical pain on and off since March of 2020. And for about a week it has been worse that she has not been able to eat food or take medications. Reports was able to eat porridge last night. Reports loose stools, which resolved overnight.No fever or leukocytosis. UA negative. COVID19 is negative. EKG obtained with normal sinus rhythm and no acute ischemic changes.  RUQ abdominal US w/o acute pathology. Patient reports partially treated recent stomach bacteria. EPASS and Cdiff negative. H. Pylori test positive today. Triple therapy regiment was initiated.   --75ml/hr of normal saline   --Diet as tolerated   --Antiemetics as needed  --Amoxicillin 1 g BID x 14 days   --Clarithromycin 500 mg BID x 14 days   --40mg of PPI twice daily     #Acute on chronic back pain   #Degenerative joint disease   Patient has diffuse back pain that has been worsening in the past 2-3 months and now it is radiating to her legs which makes difficult to walk. Patient currently has 2 hours of PCA per day. Daughter would like this increased.  CT showed T7 compression fracture. She has occasional urine leak but not complete loss of control; and no bowel incontinence.She received Flexeril and Oxycodone last evening.  Neurosurgery consulted and recommended T/L MRI to rule out malignancy. Orthotics consult.Reports backpain improved overnight.  MRI completed, chronic  compression deformity of the T7 vertebral body with mild  vertebral body height loss and associated acute to subacute large intravertebral disc herniation/Schmorl's node in the superior endplate.  PT/OT wasconsulted and recommended TCU. Patient and family are agreeable.   --Continue with home Gabapentin  -- Continue with back brace   --Tylenol and Oxycodone as needed   -- SW to follow  -- Follow up in [facility] in 6 weeks      Chronic Medical Problems:  #Chronic hepatitis C  Patient thinks she supposed be on Mavyret but waiting for insurance approval.  --Follow up with outpatient GI clinic      #Hypertension   --Continue with home Losartan and Amlodipine      #Hypothyroidism   --Continue with home Levothyroxine       #Osteoporosis   --Patient is on weekly 70mg of oral Alendronate      #Chronic iron deficiency anemia   --Continue with home oral iron      Consults: Neurosurgery for acute on chronic back pain   FEN: Oral and IV hydration   DVT prophylaxis: Anticipating short stay  Code Status: Full code  Disposition: TCU           Signed:  Bita Gunter PA-C  June 23, 2020 at 3:26 PM

## 2020-06-23 NOTE — PLAN OF CARE
"Time: 0700 - 1530    Reason for admission/Dx:   Nausea and vomiting, intractability of vomiting not specified, unspecified vomiting type [R11.2]     BP (!) 153/59 (BP Location: Left arm)   Pulse 80   Temp 97.5  F (36.4  C)   Resp 18   SpO2 100%     Shift changes: HTN, otherwise VSS, on RA. SBA, observed pt ambulating hallways and room with PT, tolerated well. Finnish speaking. Pt reports \"stomach still feeling better\", but noted temporarily HA + light-headedness + intermittent blurry vision that states is due to \"blood pressure\". See previous note. Pt denies CP, heart palpitations SOB. Oxycodone + Tylenol given 2/2 MRI premedication. Tolerating regular diet.  Voiding WDL. Family members updated again on POC.     Plan: F/u on MRI Thoracic & Lumbar spine. PT/OT f/u. Family inquiring about increased home care needs; TCU vs home w/ services    "

## 2020-06-23 NOTE — PROVIDER NOTIFICATION
"Writer entered pt room and observed pt with a bag of home medications in her lap.  At this time, pt noted endorsing HA + light-headedness + intermittent blurry vision d/t \"blood pressure\".  Patient asked writer RN if it would be ok to take her blood pressure medication, as this is what she would normally do at home, per pt report.  Subsequently, writer explained to her that we are currently monitoring and treating her HTN (153/59 now), but that we could also further treat her sx. Pt also inquiring if dosage/medication needs to be changed, because \"it doesn't work all the time\".      Per family report, granddaughter Pearl asking if further home care services is needed, d/t pt being inconsistent with adhering to daily PTA/home regimen with medications. . RNCC and PT/OT to f/u with further homecare needs.     "

## 2020-06-23 NOTE — PLAN OF CARE
Shift time: 1289-5056.    HTN otherwise VSS on RA. Alert and oriented x3, disoriented to time. Ipad  used.     Pt c/o pain in back and legs and HA. Tylenol given and aroma therapy. Lidocaine patch in place on back.     Enteric panel negative. Precautions removed.     Plan for MRI in the morning. Will cont to monitor and follow plan of care.     Leonila Paez RN on 6/23/2020 at 7:23 AM

## 2020-06-23 NOTE — PROGRESS NOTES
[facility], [place]   Neurosurgery Progress Note:    Assessment: Junaid Gunter is a 70 year old female with osteoporosis, hypertension, and hep C, who presented with non-specific back pain that radiates to lower extremities. No change in neuro exam.    Plan:  - Awaiting MRI T/L spine w/wo contrast  - Will follow results of the MRI    -----------------------------------  Ricardo Mendoza MD, MS  Neurosurgery PGY-3    Subjective: No acute events overnight.    Objective:   Temp:  [96.4  F (35.8  C)-97.8  F (36.6  C)] 97.5  F (36.4  C)  Pulse:  [68-80] 80  Resp:  [15-28] 18  BP: (134-159)/(46-66) 155/62  SpO2:  [94 %-100 %] 99 %  No intake/output data recorded.    Gen: Appears comfortable, NAD  Wound: Incision, clean, dry, intact without strikethrough  Neurologic:  - Alert & Oriented to person, place, time, and situation  - Follows commands briskly  - Speech fluent, spontaneous. No aphasia or dysarthria.  - No gaze preference. No apparent hemineglect.  - PERRL, EOMI  - Strong eye closure, jaw clench, and cheek puff  - Face symmetric with sensation intact to light touch  - Palate elevates symmetrically, uvula midline, tongue protrudes midline  - Trapezii and sternocleidomastoid muscles 5/5 bilaterally  - No pronator drift     Del Tr Bi WE WF Gr   R 5 5 5 5 5 5   L 5 5 5 5 5 5    HF KE KF DF PF EHL   R 5 5 5 5 5 5   L 5 5 5 5 5 5     Reflexes 2+ throughout    Sensation intact and symmetric to light touch throughout    LABS  Recent Labs   Lab Test 06/22/20  0735 06/21/20  1704 06/20/20  0821   WBC 4.0 5.2 5.8   HGB 9.5* 9.9* 10.0*   MCV 97 96 99   * 94* 119*       Recent Labs   Lab Test 06/22/20  0735 06/21/20  1704 06/20/20  0821    126* 130*   POTASSIUM 4.0 4.3 4.1   CHLORIDE 108 94 99   CO2 24 25 27   BUN 11 11 19   CR 0.98 0.98 0.95   ANIONGAP 6 7 4   TACHO 8.0* 8.8 8.5   GLC 89 101* 96

## 2020-06-23 NOTE — PLAN OF CARE
PT 5A OBS: Evaluation completed and treatment initiated.   Discharge Planner PT   Patient plan for discharge: TCU if covered by insurance.  Otherwise, home with improved medication management/med set up (per MD/CC current home care should be providing but there may be issues with the PCA fully understanding the medication schedule), and home PT/OT  Current status: SBA for transfers and gait with FWW with reminders for spinal precautions and reminders to wear figure 8 brace. Unsteady with cane, which is pt's preferred ambulatory AD.  Pt would benefit from 4 wheel walker with seat for return to community ambulation in setting of gait instability and decreased activity tolerance (notified OBS provider and they are entering order; pt indicates she will obtain from DME provider upon disch to home)  Barriers to return to prior living situation: lives alone, needs A donning figure 8 brace, med set up issues with current homecare provider, activity tolerance.  Recommendations for discharge: TCU.  Pt indicates if TCU not covered by insurance, she will financially need to disch home.  If pt does disch home, will need improved medication management/med set up - CC/SW is coordinating with home care on this per MD, (per MD/CC current home care should be providing but there may be issues with the PCA fully understanding the medication schedule), and home PT/OT, and use of walker at all times (not cane), until home PT indicates otherwise.   Rationale for recommendations: to progress transfers within spinal prec, progress to gait with cane, and improve activity tolerance and balance         Entered by: Julieta Roldan 06/23/2020 12:06 PM

## 2020-06-23 NOTE — UTILIZATION REVIEW
Concurrent stay review; Secondary Review Determination    Under the authority of the Utilization Management Committee, the utilization review process indicated a secondary review on the above patient. The review outcome is based on review of the medical records, discussions with staff, and applying clinical experience noted on the date of the review.    (x) Observation Status Appropriate - Concurrent stay review        RATIONALE FOR DETERMINATION:  70-year-old female with history of chronic hepatitis C presents to the hospital with generalized abdominal discomfort with nausea, vomiting, diarrhea and hyponatremia as well as acute on chronic back pain.  Patient found to have a chronic compression fracture of T7 as well as an acute to subacute large intravertebral disc herniation.  This will be managed conservatively with a brace and physical therapy.  Patient's nausea and vomiting and diarrhea has resolved.  Patient awaiting appropriate placement.    Patient is clinically improving and there is no clear indication to change patient's status to inpatient. The severity of illness, intensity of service provided, expected LOS and risk for adverse outcome make the care appropriate for observation.    This document was produced using voice recognition software    The information on this document is developed by the utilization review team in order for the business office to ensure compliance. This only denotes the appropriateness of proper admission status and does not reflect the quality of care rendered.    The definitions of Inpatient Status and Observation Status used in making the determination above are those provided in the CMS Coverage Manual, Chapter 1 and Chapter 6, section 70.4.    Sincerely,    Angelito Byrne MD  Utilization Review  Physician Advisor  [facility].

## 2020-06-23 NOTE — PLAN OF CARE
[id number]    Pt disoriented to time only. Denies nausea. Reports pain in abdomen and lower back. Lidocaine patch placed for good effect. Pt tolerating regular diet well. Swedish speaking and  used. No BM this shift. Back brace in place.     VSS on RA  /46 (BP Location: Left arm)   Pulse 72   Temp 96.8  F (36  C) (Oral)   Resp 22   SpO2 97%     Plan: MRI not done today, plan for MRI tomorrow.

## 2020-06-23 NOTE — PROGRESS NOTES
[facility]      OUTPATIENT PHYSICAL THERAPY EVALUATION  PLAN OF TREATMENT FOR OUTPATIENT REHABILITATION  (COMPLETE FOR INITIAL CLAIMS ONLY)  Patient's Last Name, First Name, M.I.  YOB: 1950  Junaid Gunter                        Provider's Name  [facility] Medical Record No.  [id number]                               Onset Date:  06/21/20   Start of Care Date:         Type:     _X_PT   ___OT   ___SLP Medical Diagnosis:                           PT Diagnosis:  impaired functional mobility   Visits from SOC:  1   _________________________________________________________________________________  Plan of Treatment/Functional Goals    Planned Interventions:  ,    gait training, transfer training, balance training, bed mobility training, neuromuscular re-education, strengthening, stretching, risk factor education, home program guidelines, progressive activity/exercise,       Goals: See Physical Therapy Goals on Care Plan in [facility] electronic health record.    Therapy Frequency: 3x/week  Predicted Duration of Therapy Intervention: 3 wks  _________________________________________________________________________________    I CERTIFY THE NEED FOR THESE SERVICES FURNISHED UNDER        THIS PLAN OF TREATMENT AND WHILE UNDER MY CARE     (Physician co-signature of this document indicates review and certification of the therapy plan).                 ,      Referring Physician: Traci Lee APRN CNP             Initial Assessment        See Physical Therapy evaluation dated   in Epic electronic health record.

## 2020-06-23 NOTE — PLAN OF CARE
Observation goals:     Diagnostic tests and consults completed and resulted: Not met (MRI needed)  Vital signs normal or at patient baseline: Met  Tolerating oral intake to maintain hydration: Met   Adequate pain control on oral analgesics: Met   Returns to baseline functional status: Met

## 2020-06-24 ENCOUNTER — APPOINTMENT (OUTPATIENT)
Dept: INTERPRETER SERVICES | Facility: CLINIC | Age: 70
End: 2020-06-24
Payer: COMMERCIAL

## 2020-06-24 VITALS
DIASTOLIC BLOOD PRESSURE: 87 MMHG | OXYGEN SATURATION: 95 % | SYSTOLIC BLOOD PRESSURE: 104 MMHG | HEART RATE: 93 BPM | RESPIRATION RATE: 18 BRPM | TEMPERATURE: 98.1 F

## 2020-06-24 LAB
ANION GAP SERPL CALCULATED.3IONS-SCNC: 4 MMOL/L (ref 3–14)
BUN SERPL-MCNC: 17 MG/DL (ref 7–30)
CALCIUM SERPL-MCNC: 8.1 MG/DL (ref 8.5–10.1)
CHLORIDE SERPL-SCNC: 106 MMOL/L (ref 94–109)
CO2 SERPL-SCNC: 27 MMOL/L (ref 20–32)
CREAT SERPL-MCNC: 1.03 MG/DL (ref 0.52–1.04)
ERYTHROCYTE [DISTWIDTH] IN BLOOD BY AUTOMATED COUNT: 14.1 % (ref 10–15)
GFR SERPL CREATININE-BSD FRML MDRD: 55 ML/MIN/{1.73_M2}
GLUCOSE SERPL-MCNC: 88 MG/DL (ref 70–99)
HCT VFR BLD AUTO: 28.5 % (ref 35–47)
HGB BLD-MCNC: 9 G/DL (ref 11.7–15.7)
MCH RBC QN AUTO: 32.1 PG (ref 26.5–33)
MCHC RBC AUTO-ENTMCNC: 31.6 G/DL (ref 31.5–36.5)
MCV RBC AUTO: 102 FL (ref 78–100)
PLATELET # BLD AUTO: 113 10E9/L (ref 150–450)
POTASSIUM SERPL-SCNC: 4.6 MMOL/L (ref 3.4–5.3)
RBC # BLD AUTO: 2.8 10E12/L (ref 3.8–5.2)
SODIUM SERPL-SCNC: 137 MMOL/L (ref 133–144)
WBC # BLD AUTO: 4.2 10E9/L (ref 4–11)

## 2020-06-24 PROCEDURE — 99217 ZZC OBSERVATION CARE DISCHARGE: CPT | Mod: Z6 | Performed by: EMERGENCY MEDICINE

## 2020-06-24 PROCEDURE — 36415 COLL VENOUS BLD VENIPUNCTURE: CPT | Performed by: PHYSICIAN ASSISTANT

## 2020-06-24 PROCEDURE — 25000132 ZZH RX MED GY IP 250 OP 250 PS 637: Performed by: PHYSICIAN ASSISTANT

## 2020-06-24 PROCEDURE — 80048 BASIC METABOLIC PNL TOTAL CA: CPT | Performed by: PHYSICIAN ASSISTANT

## 2020-06-24 PROCEDURE — 25000128 H RX IP 250 OP 636: Performed by: NURSE PRACTITIONER

## 2020-06-24 PROCEDURE — G0378 HOSPITAL OBSERVATION PER HR: HCPCS

## 2020-06-24 PROCEDURE — C9113 INJ PANTOPRAZOLE SODIUM, VIA: HCPCS | Performed by: NURSE PRACTITIONER

## 2020-06-24 PROCEDURE — 25000132 ZZH RX MED GY IP 250 OP 250 PS 637: Performed by: NURSE PRACTITIONER

## 2020-06-24 PROCEDURE — 85027 COMPLETE CBC AUTOMATED: CPT | Performed by: PHYSICIAN ASSISTANT

## 2020-06-24 PROCEDURE — 96376 TX/PRO/DX INJ SAME DRUG ADON: CPT

## 2020-06-24 RX ORDER — CLARITHROMYCIN 500 MG
500 TABLET ORAL EVERY 12 HOURS
Qty: 24 TABLET | Refills: 0 | Status: SHIPPED | OUTPATIENT
Start: 2020-06-24 | End: 2020-07-06

## 2020-06-24 RX ORDER — PANTOPRAZOLE SODIUM 40 MG/1
40 TABLET, DELAYED RELEASE ORAL 2 TIMES DAILY
Qty: 28 TABLET | Refills: 0 | Status: SHIPPED | OUTPATIENT
Start: 2020-06-24 | End: 2020-07-08

## 2020-06-24 RX ORDER — AMOXICILLIN 500 MG/1
1000 CAPSULE ORAL EVERY 12 HOURS
Qty: 48 CAPSULE | Refills: 0 | Status: SHIPPED | OUTPATIENT
Start: 2020-06-24 | End: 2020-07-06

## 2020-06-24 RX ADMIN — PANTOPRAZOLE SODIUM 40 MG: 40 INJECTION, POWDER, FOR SOLUTION INTRAVENOUS at 08:25

## 2020-06-24 RX ADMIN — MELATONIN 50 MCG: at 08:26

## 2020-06-24 RX ADMIN — CLARITHROMYCIN 500 MG: 500 TABLET, FILM COATED ORAL at 08:25

## 2020-06-24 RX ADMIN — GABAPENTIN 300 MG: 300 CAPSULE ORAL at 08:25

## 2020-06-24 RX ADMIN — OXYCODONE HYDROCHLORIDE 5 MG: 5 TABLET ORAL at 17:26

## 2020-06-24 RX ADMIN — LEVOTHYROXINE SODIUM 12.5 MCG: 25 TABLET ORAL at 08:25

## 2020-06-24 RX ADMIN — ASPIRIN 81 MG: 81 TABLET, FILM COATED ORAL at 08:25

## 2020-06-24 RX ADMIN — AMOXICILLIN 1000 MG: 500 CAPSULE ORAL at 08:25

## 2020-06-24 RX ADMIN — FERROUS GLUCONATE 324 MG: 324 TABLET ORAL at 08:25

## 2020-06-24 NOTE — DISCHARGE SUMMARY
Discharge Summary    Junaid Gunter MRN# [id number]   YOB: 1950 Age: 70 year old     Date of Admission:  6/21/2020  Date of Discharge:  No discharge date for patient encounter.  Admitting Physician:  Praveen Dangelo MD  Discharge Physician:  Tanja Paula MD  Discharging Service:  Emergency Department Observation Unit     Primary Provider: Todd Han          Discharge Diagnosis:   Abdominal pain, resolved   Low back pain, chronic              Discharge Disposition:   Discharged to home           Condition on Discharge:   Discharge condition: Stable   Code status on discharge: Full Code           Procedures:   Imaging performed:   Cervical spine MRI  Lumbar spine MRI             Discharge Medications:     Current Discharge Medication List      START taking these medications    Details   amoxicillin (AMOXIL) 500 MG capsule Take 2 capsules (1,000 mg) by mouth every 12 hours for 12 days  Qty: 48 capsule, Refills: 0    Associated Diagnoses: Gastric ulcer due to Helicobacter pylori, acute      clarithromycin (BIAXIN) 500 MG tablet Take 1 tablet (500 mg) by mouth every 12 hours for 12 days  Qty: 24 tablet, Refills: 0    Associated Diagnoses: Gastric ulcer due to Helicobacter pylori, acute      pantoprazole (PROTONIX) 40 MG EC tablet Take 1 tablet (40 mg) by mouth 2 times daily for 14 days  Qty: 28 tablet, Refills: 0    Associated Diagnoses: Gastric ulcer due to Helicobacter pylori, acute         CONTINUE these medications which have NOT CHANGED    Details   aspirin (ASA) 81 MG EC tablet Take 1 tablet (81 mg) by mouth daily  Qty: 90 tablet, Refills: 1    Associated Diagnoses: Atherosclerosis of aorta (H)      ACETAMINOPHEN EXTRA STRENGTH 500 MG tablet       diclofenac (VOLTAREN) 1 % topical gel Refills: 3      ferrous gluconate (FERGON) 324 (38 Fe) MG tablet       gabapentin (NEURONTIN) 300 MG capsule Take 1 capsule (300 mg) by mouth 2 times daily  Qty: 60 capsule, Refills: 1    Associated  Diagnoses: Peripheral polyneuropathy; Pain of left side of body      losartan (COZAAR) 100 MG tablet       SENNA-LAX 8.6 MG tablet       thyroid (ARMOUR) 15 MG tablet Take 1 tablet (15 mg) by mouth daily  Qty: 30 tablet, Refills: 1    Associated Diagnoses: Hypothyroidism, unspecified type      tiZANidine (ZANAFLEX) 4 MG tablet Take 1 tablet (4 mg) by mouth 2 times daily as needed for muscle spasms  Qty: 30 tablet, Refills: 1    Associated Diagnoses: Neck pain      vitamin D3 (CHOLECALCIFEROL) 2000 units (50 mcg) tablet          STOP taking these medications       famotidine (PEPCID) 20 MG tablet Comments:   Reason for Stopping:         FLUZONE HIGH-DOSE 0.5 ML injection Comments:   Reason for Stopping:         glecaprevir-pibrentasvir (MAVYRET) 100-40 MG per tablet Comments:   Reason for Stopping:         PREVNAR 13 SUSP injection Comments:   Reason for Stopping:         simvastatin (ZOCOR) 10 MG tablet Comments:   Reason for Stopping:                     Consultations:   Consultation during this admission received from neurosurgery              Brief History of Illness:   Junaid Gunter is a 70 year old female with a PMH significant for hypertension, hypothyroidism , DJD, osteoporosis, and chronic hepatitis C who presented to the Emergency Department for evaluation of abdominal pain and back pain.           Hospital Course:   #Generalized abdominal pain   #Nausea and vomiting   #Diarrhea   #Hyponatremia: resolved  Patient reports having umbilical pain on and off since March of 2020. And for about a week it has been worse that she has not been able to eat food or take medications. Reports was able to eat porridge last night. Reports loose stools, which resolved overnight.No fever or leukocytosis. UA negative. COVID19 is negative. EKG obtained with normal sinus rhythm and no acute ischemic changes.  RUQ abdominal US w/o acute pathology. Patient reports partially treated recent stomach bacteria. EPASS and Cdiff  negative. H. Pylori test positive today. Triple therapy regiment was initiated.   --Diet as tolerated   --Antiemetics as needed  --Amoxicillin 1 g BID x 14 days   --Clarithromycin 500 mg BID x 14 days   --40mg of PPI twice daily     #Acute on chronic back pain   #Degenerative joint disease   Patient has diffuse back pain that has been worsening in the past 2-3 months and now it is radiating to her legs which makes difficult to walk. Patient currently has 2 hours of PCA per day. Daughter would like this increased.  CT showed T7 compression fracture. She has occasional urine leak but not complete loss of control; and no bowel incontinence.She received Flexeril and Oxycodone last evening.  Neurosurgery consulted and recommended T/L MRI to rule out malignancy. Orthotics consult.Reports backpain improved overnight.  MRI completed, chronic compression deformity of the T7 vertebral body with mild vertebral body height loss and associated acute to subacute large intravertebral disc herniation/Schmorl's node in the superior endplate.  PT/OT wasconsulted and recommended TCU. Patient and family are agreeable yesterday, however decided today that patient would benefit discharging home due to COVID-19 concerns. Therefore patient discharged home. Patient will be transported by her granddaughter.   --Continue with home Gabapentin  -- Continue with back brace   --Tylenol prn for pain  -- Follow up in [facility] in 6 weeks      Chronic Medical Problems:  #Chronic hepatitis C  Patient thinks she supposed be on Mavyret but waiting for insurance approval.  --Follow up with outpatient GI clinic      #Hypertension   --Continue with home Losartan and Amlodipine      #Hypothyroidism   --Continue with home Levothyroxine       #Osteoporosis   --Patient is on weekly 70mg of oral Alendronate      #Chronic iron deficiency anemia   --Continue with home oral iron             Final Day of Progress before Discharge:       Physical Exam:  Blood  pressure 104/87, pulse 93, temperature 98.1  F (36.7  C), temperature source Oral, resp. rate 18, SpO2 95 %.    EXAM:  Physical Exam   Constitutional: Pt is oriented to person, place, and time.Pt appears well-developed and well-nourished.   HENT:   Head: Normocephalic and atraumatic.   Eyes: Conjunctivae are normal. Pupils are equal, round, and reactive to light.   Neck: Normal range of motion. Neck supple.   Cardiovascular: Normal rate, regular rhythm, normal heart sounds and intact distal pulses.    Pulmonary/Chest: Effort normal and breath sounds normal. No respiratory distress. Pt has no wheezes. Pt has no rales  Abdominal: Soft. Bowel sounds are normal. Pt exhibits no distension and no mass. No tenderness. Pt has no rebound and no guarding.   Musculoskeletal: Normal range of motion. Pt exhibits no edema.   Neurological: Pt is alert and oriented to person, place, and time. Normal reflexes.   Skin: Skin is warm and dry. No rash noted.   Psychiatric: Pt has a normal mood and affect. Behavior is normal. Judgment and thought content normal.             Data:  All laboratory data reviewed             Significant Results:   None  Results for orders placed or performed during the hospital encounter of 06/21/20   Abdomen US, limited (RUQ only)     Status: None    Narrative    Ultrasound abdominal right upper quadrant, 6/21/2020 6:11 PM.    Comparison: Chest abdomen and pelvis pelvis CT 6/20/2020..    History: abd pain, ? Biliarty ract disease.    Findings:    There is no ascites.     Liver: The liver measures 14.8 cm in greatest cranial caudal  dimension. The liver parenchyma has normal echotexture and  echogenicity. There are no focal masses. There are no portal-systemic  collaterals identified. There is no intrahepatic biliary dilatation.    Gallbladder: There was a negative sonographic Funk's sign. The  gallbladder lumen is anechoic and not distended. The wall is not  thickened. The extrahepatic bile duct measures  4 mm. There is no  extrahepatic biliary dilatation. There is no pericholecystic fluid.    Pancreas: The visualized pancreas is unremarkable. Unable to visualize  portions of the pancreatic tail due to overlying bowel gas.     Right Kidney: There is no hydronephrosis or hydroureter. There are no  shadowing renal calculi, cystic lesion or mass. The right kidney  measures 9.2 cm long. The capsule and parenchyma have normal  echotexture.       Impression    Impression:   No sonographic evidence for acute cholecystitis. No intra or  extrahepatic biliary ductal dilatation.        I have personally reviewed the examination and initial interpretation  and I agree with the findings.    LEFTY PAPPAS MD   MR Lumbar Spine w/o & w Contrast     Status: None    Narrative    MR LUMBAR SPINE W/O & W CONTRAST, MR THORACIC SPINE W/O &  W CONTRAST 6/23/2020 1:14 PM    Provided History: L/S-spine fx, traumatic.    Comparison: No direct comparison. Correlation made with 6/23/2020 CT  CAP    Technique:   Sagittal T1- and T2-weighted images through the thoracic and lumbar  spine and axial T2-weighted images were obtained without intravenous  contrast. Following intravenous administration of gadolinium, axial  and sagittal T1-weighted images with fat saturation were also  obtained.    Contrast: 7.5mL Gadavist    Findings:     The thoracic vertebrae appear normally aligned. There is a chronic  appearing anterior wedging compression deformity of the T7 vertebral  body with approximately 30% vertebral body height loss anteriorly.  Additionally, there is a focal intravertebral disc herniation in the  superior endplate of T7 with associated contrast enhancement within  this deformity.  There are no other abnormal contrast enhancement  within the thoracic spinal cord vertebral column. There is normal  signal within and normal contour of the thoracic spinal cord.      Regarding numbering convention, there are 5 lumbar-type vertebrae  assumed for  the purposes of this dictation.  The tip of the conus  medullaris is at  L1. Grade 1 anterolisthesis of L5 on S1..  There is  no significant disc height narrowing at any lumbar level.  Regarding  bone marrow signal intensity, no abnormality is visualized on STIR  images.    On a level by level basis:    L1-2: Posterior broad-based disc bulge and flaval hypertrophy with  mild spinal stenosis. No neuroforaminal stenosis..    L2-3: Posterior broad-based disc bulge and flaval hypertrophy with  mild spinal stenosis. No neuroforaminal stenosis...    L3-4: Posterior broad-based disc bulge with facet arthropathy and  flaval hypertrophy resulting in mild to moderate spinal stenosis. Mild  bilateral neuroforaminal stenosis.    L4-5: Flavum hypertrophy with facet arthropathy and posterior disc  bulge resulting in severe spinal stenosis. Mild to moderate bilateral  neural foraminal stenosis.     L5-S1: No significant spinal canal or foraminal narrowing.  Anterolisthesis and ligamentum flavum thickening with facet  arthropathy    Contrast-enhanced images demonstrate no abnormal enhancement in the  visualized paraspinous tissues anteriorly or in the spinal canal or  vertebra. Normal variant dependent edema in the dorsal subcutaneous  tissues.      Impression    Impression:   1. Chronic compression deformity of the T7 vertebral body with mild  vertebral body height loss and associated acute to subacute large  intravertebral disc herniation/Schmorl's node in the superior  endplate.   2. Multilevel lumbar spondylosis most significant for a severe  stenosis at the level of L4-L5.  3. Grade 1 anterolisthesis of L5 on S1.     I have personally reviewed the examination and initial interpretation  and I agree with the findings.    JAMAL CRAVEN MD   MR Thoracic Spine w/o & w Contrast     Status: None    Narrative    MR LUMBAR SPINE W/O & W CONTRAST, MR THORACIC SPINE W/O &  W CONTRAST 6/23/2020 1:14 PM    Provided History: L/S-spine fx,  traumatic.    Comparison: No direct comparison. Correlation made with 6/23/2020 CT  CAP    Technique:   Sagittal T1- and T2-weighted images through the thoracic and lumbar  spine and axial T2-weighted images were obtained without intravenous  contrast. Following intravenous administration of gadolinium, axial  and sagittal T1-weighted images with fat saturation were also  obtained.    Contrast: 7.5mL Gadavist    Findings:     The thoracic vertebrae appear normally aligned. There is a chronic  appearing anterior wedging compression deformity of the T7 vertebral  body with approximately 30% vertebral body height loss anteriorly.  Additionally, there is a focal intravertebral disc herniation in the  superior endplate of T7 with associated contrast enhancement within  this deformity.  There are no other abnormal contrast enhancement  within the thoracic spinal cord vertebral column. There is normal  signal within and normal contour of the thoracic spinal cord.      Regarding numbering convention, there are 5 lumbar-type vertebrae  assumed for the purposes of this dictation.  The tip of the conus  medullaris is at  L1. Grade 1 anterolisthesis of L5 on S1..  There is  no significant disc height narrowing at any lumbar level.  Regarding  bone marrow signal intensity, no abnormality is visualized on STIR  images.    On a level by level basis:    L1-2: Posterior broad-based disc bulge and flaval hypertrophy with  mild spinal stenosis. No neuroforaminal stenosis..    L2-3: Posterior broad-based disc bulge and flaval hypertrophy with  mild spinal stenosis. No neuroforaminal stenosis...    L3-4: Posterior broad-based disc bulge with facet arthropathy and  flaval hypertrophy resulting in mild to moderate spinal stenosis. Mild  bilateral neuroforaminal stenosis.    L4-5: Flavum hypertrophy with facet arthropathy and posterior disc  bulge resulting in severe spinal stenosis. Mild to moderate bilateral  neural foraminal stenosis.      L5-S1: No significant spinal canal or foraminal narrowing.  Anterolisthesis and ligamentum flavum thickening with facet  arthropathy    Contrast-enhanced images demonstrate no abnormal enhancement in the  visualized paraspinous tissues anteriorly or in the spinal canal or  vertebra. Normal variant dependent edema in the dorsal subcutaneous  tissues.      Impression    Impression:   1. Chronic compression deformity of the T7 vertebral body with mild  vertebral body height loss and associated acute to subacute large  intravertebral disc herniation/Schmorl's node in the superior  endplate.   2. Multilevel lumbar spondylosis most significant for a severe  stenosis at the level of L4-L5.  3. Grade 1 anterolisthesis of L5 on S1.     I have personally reviewed the examination and initial interpretation  and I agree with the findings.    JAMAL CRAVEN MD   CBC with platelets differential     Status: Abnormal   Result Value Ref Range    WBC 5.2 4.0 - 11.0 10e9/L    RBC Count 3.09 (L) 3.8 - 5.2 10e12/L    Hemoglobin 9.9 (L) 11.7 - 15.7 g/dL    Hematocrit 29.7 (L) 35.0 - 47.0 %    MCV 96 78 - 100 fl    MCH 32.0 26.5 - 33.0 pg    MCHC 33.3 31.5 - 36.5 g/dL    RDW 13.4 10.0 - 15.0 %    Platelet Count 94 (L) 150 - 450 10e9/L    Diff Method Manual Differential     % Neutrophils 75.0 %    % Lymphocytes 20.5 %    % Monocytes 4.5 %    % Eosinophils 0.0 %    % Basophils 0.0 %    Absolute Neutrophil 3.9 1.6 - 8.3 10e9/L    Absolute Lymphocytes 1.1 0.8 - 5.3 10e9/L    Absolute Monocytes 0.2 0.0 - 1.3 10e9/L    Absolute Eosinophils 0.0 0.0 - 0.7 10e9/L    Absolute Basophils 0.0 0.0 - 0.2 10e9/L    RBC Morphology Normal     Platelet Estimate Confirming automated cell count    Comprehensive metabolic panel     Status: Abnormal   Result Value Ref Range    Sodium 126 (L) 133 - 144 mmol/L    Potassium 4.3 3.4 - 5.3 mmol/L    Chloride 94 94 - 109 mmol/L    Carbon Dioxide 25 20 - 32 mmol/L    Anion Gap 7 3 - 14 mmol/L    Glucose 101 (H) 70  - 99 mg/dL    Urea Nitrogen 11 7 - 30 mg/dL    Creatinine 0.98 0.52 - 1.04 mg/dL    GFR Estimate 58 (L) >60 mL/min/[1.73_m2]    GFR Estimate If Black 68 >60 mL/min/[1.73_m2]    Calcium 8.8 8.5 - 10.1 mg/dL    Bilirubin Total 0.4 0.2 - 1.3 mg/dL    Albumin 3.4 3.4 - 5.0 g/dL    Protein Total 7.4 6.8 - 8.8 g/dL    Alkaline Phosphatase 72 40 - 150 U/L    ALT 23 0 - 50 U/L    AST 32 0 - 45 U/L   Lipase     Status: None   Result Value Ref Range    Lipase 359 73 - 393 U/L   Lactic acid whole blood     Status: None   Result Value Ref Range    Lactic Acid 0.8 0.7 - 2.0 mmol/L   UA reflex to Microscopic and Culture     Status: Abnormal    Specimen: Urine Midstream; Midstream Urine   Result Value Ref Range    Color Urine Light Yellow     Appearance Urine Clear     Glucose Urine Negative NEG^Negative mg/dL    Bilirubin Urine Negative NEG^Negative    Ketones Urine Negative NEG^Negative mg/dL    Specific Gravity Urine 1.007 1.003 - 1.035    Blood Urine Negative NEG^Negative    pH Urine 7.5 (H) 5.0 - 7.0 pH    Protein Albumin Urine Negative NEG^Negative mg/dL    Urobilinogen mg/dL Normal 0.0 - 2.0 mg/dL    Nitrite Urine Negative NEG^Negative    Leukocyte Esterase Urine Negative NEG^Negative    Source Midstream Urine    Symptomatic COVID-19 Virus (Coronavirus) by PCR     Status: None    Specimen: Nasopharyngeal   Result Value Ref Range    COVID-19 Virus PCR to [facility] - Source Nasopharyngeal     COVID-19 Virus PCR to [facility] - Result       Test received-See reflex to IDDL test SARS CoV2 (COVID-19) Virus RT-PCR   SARS-CoV-2 COVID-19 Virus (Coronavirus) RT-PCR Nasopharyngeal     Status: None    Specimen: Nasopharyngeal   Result Value Ref Range    SARS-CoV-2 Virus Specimen Source Nasopharyngeal     SARS-CoV-2 PCR Result NEGATIVE     SARS-CoV-2 PCR Comment       Testing was performed using the Xpert Xpress SARS-CoV-2 Assay on the Cepheid Gene-Xpert   Instrument Systems. Additional information about this Emergency Use Authorization  (EUA)   assay can be found via the Lab Guide.     Helicobacter pylori Antigen Stool     Status: Abnormal   Result Value Ref Range    Helicobacter pylori Antigen Stool Positive (A) NEG^Negative   Comprehensive metabolic panel     Status: Abnormal   Result Value Ref Range    Sodium 137 133 - 144 mmol/L    Potassium 4.0 3.4 - 5.3 mmol/L    Chloride 108 94 - 109 mmol/L    Carbon Dioxide 24 20 - 32 mmol/L    Anion Gap 6 3 - 14 mmol/L    Glucose 89 70 - 99 mg/dL    Urea Nitrogen 11 7 - 30 mg/dL    Creatinine 0.98 0.52 - 1.04 mg/dL    GFR Estimate 59 (L) >60 mL/min/[1.73_m2]    GFR Estimate If Black 68 >60 mL/min/[1.73_m2]    Calcium 8.0 (L) 8.5 - 10.1 mg/dL    Bilirubin Total 0.4 0.2 - 1.3 mg/dL    Albumin 2.7 (L) 3.4 - 5.0 g/dL    Protein Total 6.6 (L) 6.8 - 8.8 g/dL    Alkaline Phosphatase 60 40 - 150 U/L    ALT 19 0 - 50 U/L    AST 21 0 - 45 U/L   CBC with platelets differential     Status: Abnormal   Result Value Ref Range    WBC 4.0 4.0 - 11.0 10e9/L    RBC Count 2.98 (L) 3.8 - 5.2 10e12/L    Hemoglobin 9.5 (L) 11.7 - 15.7 g/dL    Hematocrit 29.0 (L) 35.0 - 47.0 %    MCV 97 78 - 100 fl    MCH 31.9 26.5 - 33.0 pg    MCHC 32.8 31.5 - 36.5 g/dL    RDW 13.8 10.0 - 15.0 %    Platelet Count 116 (L) 150 - 450 10e9/L    Diff Method Automated Method     % Neutrophils 58.1 %    % Lymphocytes 31.1 %    % Monocytes 9.2 %    % Eosinophils 0.2 %    % Basophils 0.2 %    % Immature Granulocytes 1.2 %    Nucleated RBCs 0 0 /100    Absolute Neutrophil 2.3 1.6 - 8.3 10e9/L    Absolute Lymphocytes 1.3 0.8 - 5.3 10e9/L    Absolute Monocytes 0.4 0.0 - 1.3 10e9/L    Absolute Eosinophils 0.0 0.0 - 0.7 10e9/L    Absolute Basophils 0.0 0.0 - 0.2 10e9/L    Abs Immature Granulocytes 0.1 0 - 0.4 10e9/L    Absolute Nucleated RBC 0.0    CBC with platelets     Status: Abnormal   Result Value Ref Range    WBC 6.3 4.0 - 11.0 10e9/L    RBC Count 3.07 (L) 3.8 - 5.2 10e12/L    Hemoglobin 9.8 (L) 11.7 - 15.7 g/dL    Hematocrit 30.7 (L) 35.0 - 47.0 %     " 78 - 100 fl    MCH 31.9 26.5 - 33.0 pg    MCHC 31.9 31.5 - 36.5 g/dL    RDW 14.0 10.0 - 15.0 %    Platelet Count 127 (L) 150 - 450 10e9/L   Basic metabolic panel     Status: Abnormal   Result Value Ref Range    Sodium 137 133 - 144 mmol/L    Potassium 4.6 3.4 - 5.3 mmol/L    Chloride 106 94 - 109 mmol/L    Carbon Dioxide 27 20 - 32 mmol/L    Anion Gap 4 3 - 14 mmol/L    Glucose 88 70 - 99 mg/dL    Urea Nitrogen 17 7 - 30 mg/dL    Creatinine 1.03 0.52 - 1.04 mg/dL    GFR Estimate 55 (L) >60 mL/min/[1.73_m2]    GFR Estimate If Black 64 >60 mL/min/[1.73_m2]    Calcium 8.1 (L) 8.5 - 10.1 mg/dL   CBC with platelets     Status: Abnormal   Result Value Ref Range    WBC 4.2 4.0 - 11.0 10e9/L    RBC Count 2.80 (L) 3.8 - 5.2 10e12/L    Hemoglobin 9.0 (L) 11.7 - 15.7 g/dL    Hematocrit 28.5 (L) 35.0 - 47.0 %     (H) 78 - 100 fl    MCH 32.1 26.5 - 33.0 pg    MCHC 31.6 31.5 - 36.5 g/dL    RDW 14.1 10.0 - 15.0 %    Platelet Count 113 (L) 150 - 450 10e9/L   EKG 12-lead, tracing only     Status: None   Result Value Ref Range    Interpretation ECG Click View Image link to view waveform and result    Social Work IP Consult     Status: None ()    Romina Torres MSW     6/22/2020  9:00 AM  Social Work Services Progress Note    Hospital Day: 2  Collaborated with:  Chart review    Data:  Pt is a 70-year-old female admitted to [facility] 6/21/20 for   evaluation of abdominal pain and back pain.     Intervention:  SW consult received for \"discharge planning.\"   PT/OT not ordered to eval for discharge recommendations. SW to   follow if PT/OT to be ordered and recommend TCU placement. If pt   requires home care services at d/c [facility] to follow.    Assessment:  No SW discharge needs identified at this time    Plan:    Anticipated Disposition:  Home, no needs identified    Barriers to d/c plan:  Medical stability    Follow Up:  SW to follow and assist with discharge planning if   SW needs identified    Romina" LASHAY Stratton, LICSW    [facility]  Pager [phone]  Phone [phone]         Neurosurgery Adult IP Consult: Patient to be seen: Routine within 24 hrs; Call back #: [id number]; Patient w/back pain that is radiating to legs in the setting of T7 compression deformity on CT; Consultant may enter orders: Yes; Requesting provider? Ot...     Status: None ()    Narrative    Reanna Joseph MD     6/22/2020 11:37 AM  [facility]       NEUROSURGERY CONSULTATION NOTE    This consultation was requested by Dr. Bingham from the   Medicine service.    Reason for Consultation: Patient w/back pain that is radiating to   legs in the setting of T7 compression deformity on CT    HPI: Junaid Gunter is a 70 year old female with a PMH of HTN,   chronic hep C, hypothyroidism, osteoporosis, with a 2 month   history of non-specific back pain that radiates to her legs and   umbilicus. She denies any weakness, loss of sphincter control,   numbness/tingling. She denies any history of trauma. At this   time, her back pain has resolved.       PAST MEDICAL HISTORY:   Past Medical History:   Diagnosis Date     DJD (degenerative joint disease)      HCV (hepatitis C virus)      Hypertension      Osteoporosis        PAST SURGICAL HISTORY: No past surgical history on file.    FAMILY HISTORY:   Family History   Problem Relation Age of Onset     Depression Sister        SOCIAL HISTORY:   Social History     Tobacco Use     Smoking status: Never Smoker     Smokeless tobacco: Never Used   Substance Use Topics     Alcohol use: Never     Frequency: Never       MEDICATIONS:  Medications Prior to Admission   Medication Sig Dispense Refill Last Dose     aspirin (ASA) 81 MG EC tablet Take 1 tablet (81 mg) by mouth   daily 90 tablet 1 Past Week at Unknown time     ACETAMINOPHEN EXTRA STRENGTH 500 MG tablet         diclofenac (VOLTAREN) 1 %  topical gel   3 Unknown at Unknown   time     famotidine (PEPCID) 20 MG tablet Take 1 tablet (20 mg) by mouth   2 times daily 60 tablet 1 Unknown at Unknown time     ferrous gluconate (FERGON) 324 (38 Fe) MG tablet    Unknown at   Unknown time     Ferrous Gluconate 324 (37.5 Fe) MG TABS    Unknown at Unknown   time     FLUZONE HIGH-DOSE 0.5 ML injection    Unknown at Unknown time     gabapentin (NEURONTIN) 300 MG capsule Take 1 capsule (300 mg)   by mouth 2 times daily 60 capsule 1 Unknown at Unknown time     glecaprevir-pibrentasvir (MAVYRET) 100-40 MG per tablet Take 3   tablets by mouth daily (with breakfast) (Patient not taking:   Reported on 3/10/2020) 84 tablet 1 Unknown at Unknown time     losartan (COZAAR) 100 MG tablet    Unknown at Unknown time     PREVNAR 13 SUSP injection    Unknown at Unknown time     SENNA-LAX 8.6 MG tablet    Unknown at Unknown time     simvastatin (ZOCOR) 10 MG tablet Take 1 tablet (10 mg) by mouth   At Bedtime 90 tablet 1 Unknown at Unknown time     thyroid (ARMOUR) 15 MG tablet Take 1 tablet (15 mg) by mouth   daily 30 tablet 1 Unknown at Unknown time     tiZANidine (ZANAFLEX) 4 MG tablet Take 1 tablet (4 mg) by mouth   2 times daily as needed for muscle spasms 30 tablet 1 Unknown at   Unknown time     vitamin D3 (CHOLECALCIFEROL) 2000 units (50 mcg) tablet      Unknown at Unknown time       Allergies:  Allergies   Allergen Reactions     Egg [Chicken-Derived Products (Egg)] Itching       Physical exam:   Blood pressure (!) 146/66, pulse 76, temperature 96.5  F (35.8    C), temperature source Axillary, resp. rate 16, SpO2 95 %.    NEUROLOGIC:  -- Awake; Alert; oriented x 3  -- Follows commands briskly  -- Speech unable to assess content (Scottish)  -- no gaze preference. No apparent hemineglect.  Cranial Nerves:  -- visual fields full to confrontation, PERRL 3-2mm bilat and   brisk, extraocular movements intact  -- face symmetrical, tongue midline  -- sensory V1-V3 intact  bilaterally  -- palate elevates symmetrically, uvula midline  -- hearing grossly intact bilat  -- Trapezii 5/5 strength bilat symmetric  -- Cerebellar: Finger nose finger without dysmetria, intact rapid   alternating motions bilaterally    Motor:  Normal bulk / tone; no tremor, rigidity, or bradykinesia.  No   muscle wasting or fasciculations  No Pronator Drift     Delt Bi Tri Hand Flexion/  Extension Iliopsoas Quadriceps Hamstrings Tibialis Anterior   Gastroc    C5 C6 C7 C8/T1 L2 L3 L4-S1 L4 S1   R 5 5 5 5 5 5 5 5 5   L 5 5 5 5 5 5 5 5 5   Sensory:  intact to LT x 4 extremities     Reflexes:     Bi Tri BR Zeinab Pat Ach Bab    C5-6 C7-8 C6 UMN L2-4 S1 UMN   R 2+ 2+ 2+ Norm 2+ 2+ Norm   L 2+ 2+ 2+ Norm 2+ 2+ Norm     Gait: Wide base gait, chronic.       LABS:  Last Comprehensive Metabolic Panel:  Sodium   Date Value Ref Range Status   06/22/2020 137 133 - 144 mmol/L Final     Potassium   Date Value Ref Range Status   06/22/2020 4.0 3.4 - 5.3 mmol/L Final     Chloride   Date Value Ref Range Status   06/22/2020 108 94 - 109 mmol/L Final     Carbon Dioxide   Date Value Ref Range Status   06/22/2020 24 20 - 32 mmol/L Final     Anion Gap   Date Value Ref Range Status   06/22/2020 6 3 - 14 mmol/L Final     Glucose   Date Value Ref Range Status   06/22/2020 89 70 - 99 mg/dL Final     Urea Nitrogen   Date Value Ref Range Status   06/22/2020 11 7 - 30 mg/dL Final     Creatinine   Date Value Ref Range Status   06/22/2020 0.98 0.52 - 1.04 mg/dL Final     GFR Estimate   Date Value Ref Range Status   06/22/2020 59 (L) >60 mL/min/[1.73_m2] Final     Comment:     Non  GFR Calc  Starting 12/18/2018, serum creatinine based estimated GFR (eGFR)   will be   calculated using the Chronic Kidney Disease Epidemiology   Collaboration   (CKD-EPI) equation.       Calcium   Date Value Ref Range Status   06/22/2020 8.0 (L) 8.5 - 10.1 mg/dL Final     Lab Results   Component Value Date    WBC 4.0 06/22/2020     Lab Results    Component Value Date    RBC 2.98 2020     Lab Results   Component Value Date    HGB 9.5 2020     Lab Results   Component Value Date    HCT 29.0 2020     Lab Results   Component Value Date    MCV 97 2020     Lab Results   Component Value Date    MCH 31.9 2020     Lab Results   Component Value Date    MCHC 32.8 2020     Lab Results   Component Value Date    RDW 13.8 2020     Lab Results   Component Value Date     2020         IMAGIN/20 CTA: T7 compression fracture with 57% height loss, possible   punched-out lesion. No spinal canal narrowing.     ASSESSMENT:   Ms. Gunter is a 71 yo F with a PMH of HTN, hypothyroidism,   chronic hepatitis C and osteoporosis. Has a history of 2-3 months   of non-specific back pain and radiating leg pain, no neurological   deficits. CT shows T7 compression fracture. Due to punched-out   appearance, would recommend contrast T-spine MRI to r/o fracture   etiology.       RECOMMENDATIONS:  No neurosurgical intervention indicated at this time   MRI w/ and w/o contrast T-spine   Pain control  Orthotics consult: figure of 8 brace until follow-up in clinic  Follow up in [facility] in 6 weeks     The patient was discussed with Dr. Zaragoza, neurosurgery chief   resident, and Dr. Mitchell, neurosurgery staff, and they agree   with the above.    Reanna Morrow MD  Neurosurgery Resident, PGY-1       Troponin POCT     Status: None   Result Value Ref Range    Troponin I 0.00 0.00 - 0.08 ug/L   Enteric Bacteria and Virus Panel by MONICA Stool     Status: None    Specimen: Feces   Result Value Ref Range    Campylobacter group by MONICA Not Detected NDET^Not Detected    Salmonella species by MONICA Not Detected NDET^Not Detected    Shigella species by MONICA Not Detected NDET^Not Detected    Vibrio group by MONICA Not Detected NDET^Not Detected    Rotavirus A by MONICA Not Detected NDET^Not Detected    Shiga toxin 1 gene by MONICA Not Detected NDET^Not  Detected    Shiga toxin 2 gene by MONICA Not Detected NDET^Not Detected    Norovirus I and II by MONICA Not Detected NDET^Not Detected    Yersinia enterocolitica by MONICA Not Detected NDET^Not Detected    Enteric pathogen comment       Testing performed by multiplexed, qualitative PCR using the [employer] Enteric   Pathogens Nucleic Acid Test. Results should not be used as the sole basis for diagnosis,   treatment, or other patient management decisions.     Clostridium difficile toxin B PCR     Status: None    Specimen: Feces   Result Value Ref Range    Specimen Description Feces     C Diff Toxin B PCR Negative NEG^Negative   Urine Culture Aerobic Bacterial     Status: None    Specimen: Unspecified Urine   Result Value Ref Range    Specimen Description Unspecified Urine     Special Requests Specimen received in preservative     Culture Micro No growth       Recent Results (from the past 48 hour(s))   MR Lumbar Spine w/o & w Contrast    Narrative    MR LUMBAR SPINE W/O & W CONTRAST, MR THORACIC SPINE W/O &  W CONTRAST 6/23/2020 1:14 PM    Provided History: L/S-spine fx, traumatic.    Comparison: No direct comparison. Correlation made with 6/23/2020 CT  CAP    Technique:   Sagittal T1- and T2-weighted images through the thoracic and lumbar  spine and axial T2-weighted images were obtained without intravenous  contrast. Following intravenous administration of gadolinium, axial  and sagittal T1-weighted images with fat saturation were also  obtained.    Contrast: 7.5mL Gadavist    Findings:     The thoracic vertebrae appear normally aligned. There is a chronic  appearing anterior wedging compression deformity of the T7 vertebral  body with approximately 30% vertebral body height loss anteriorly.  Additionally, there is a focal intravertebral disc herniation in the  superior endplate of T7 with associated contrast enhancement within  this deformity.  There are no other abnormal contrast enhancement  within the thoracic  spinal cord vertebral column. There is normal  signal within and normal contour of the thoracic spinal cord.      Regarding numbering convention, there are 5 lumbar-type vertebrae  assumed for the purposes of this dictation.  The tip of the conus  medullaris is at  L1. Grade 1 anterolisthesis of L5 on S1..  There is  no significant disc height narrowing at any lumbar level.  Regarding  bone marrow signal intensity, no abnormality is visualized on STIR  images.    On a level by level basis:    L1-2: Posterior broad-based disc bulge and flaval hypertrophy with  mild spinal stenosis. No neuroforaminal stenosis..    L2-3: Posterior broad-based disc bulge and flaval hypertrophy with  mild spinal stenosis. No neuroforaminal stenosis...    L3-4: Posterior broad-based disc bulge with facet arthropathy and  flaval hypertrophy resulting in mild to moderate spinal stenosis. Mild  bilateral neuroforaminal stenosis.    L4-5: Flavum hypertrophy with facet arthropathy and posterior disc  bulge resulting in severe spinal stenosis. Mild to moderate bilateral  neural foraminal stenosis.     L5-S1: No significant spinal canal or foraminal narrowing.  Anterolisthesis and ligamentum flavum thickening with facet  arthropathy    Contrast-enhanced images demonstrate no abnormal enhancement in the  visualized paraspinous tissues anteriorly or in the spinal canal or  vertebra. Normal variant dependent edema in the dorsal subcutaneous  tissues.      Impression    Impression:   1. Chronic compression deformity of the T7 vertebral body with mild  vertebral body height loss and associated acute to subacute large  intravertebral disc herniation/Schmorl's node in the superior  endplate.   2. Multilevel lumbar spondylosis most significant for a severe  stenosis at the level of L4-L5.  3. Grade 1 anterolisthesis of L5 on S1.     I have personally reviewed the examination and initial interpretation  and I agree with the findings.    JAMAL CRAVEN MD    MR Thoracic Spine w/o & w Contrast    Narrative    MR LUMBAR SPINE W/O & W CONTRAST, MR THORACIC SPINE W/O &  W CONTRAST 6/23/2020 1:14 PM    Provided History: L/S-spine fx, traumatic.    Comparison: No direct comparison. Correlation made with 6/23/2020 CT  CAP    Technique:   Sagittal T1- and T2-weighted images through the thoracic and lumbar  spine and axial T2-weighted images were obtained without intravenous  contrast. Following intravenous administration of gadolinium, axial  and sagittal T1-weighted images with fat saturation were also  obtained.    Contrast: 7.5mL Gadavist    Findings:     The thoracic vertebrae appear normally aligned. There is a chronic  appearing anterior wedging compression deformity of the T7 vertebral  body with approximately 30% vertebral body height loss anteriorly.  Additionally, there is a focal intravertebral disc herniation in the  superior endplate of T7 with associated contrast enhancement within  this deformity.  There are no other abnormal contrast enhancement  within the thoracic spinal cord vertebral column. There is normal  signal within and normal contour of the thoracic spinal cord.      Regarding numbering convention, there are 5 lumbar-type vertebrae  assumed for the purposes of this dictation.  The tip of the conus  medullaris is at  L1. Grade 1 anterolisthesis of L5 on S1..  There is  no significant disc height narrowing at any lumbar level.  Regarding  bone marrow signal intensity, no abnormality is visualized on STIR  images.    On a level by level basis:    L1-2: Posterior broad-based disc bulge and flaval hypertrophy with  mild spinal stenosis. No neuroforaminal stenosis..    L2-3: Posterior broad-based disc bulge and flaval hypertrophy with  mild spinal stenosis. No neuroforaminal stenosis...    L3-4: Posterior broad-based disc bulge with facet arthropathy and  flaval hypertrophy resulting in mild to moderate spinal stenosis. Mild  bilateral neuroforaminal  stenosis.    L4-5: Flavum hypertrophy with facet arthropathy and posterior disc  bulge resulting in severe spinal stenosis. Mild to moderate bilateral  neural foraminal stenosis.     L5-S1: No significant spinal canal or foraminal narrowing.  Anterolisthesis and ligamentum flavum thickening with facet  arthropathy    Contrast-enhanced images demonstrate no abnormal enhancement in the  visualized paraspinous tissues anteriorly or in the spinal canal or  vertebra. Normal variant dependent edema in the dorsal subcutaneous  tissues.      Impression    Impression:   1. Chronic compression deformity of the T7 vertebral body with mild  vertebral body height loss and associated acute to subacute large  intravertebral disc herniation/Schmorl's node in the superior  endplate.   2. Multilevel lumbar spondylosis most significant for a severe  stenosis at the level of L4-L5.  3. Grade 1 anterolisthesis of L5 on S1.     I have personally reviewed the examination and initial interpretation  and I agree with the findings.    JAMAL CRAVEN MD                Pending Results:   Unresulted Labs Ordered in the Past 30 Days of this Admission     No orders found from 5/22/2020 to 6/22/2020.                  Discharge Instructions and Follow-Up:     Discharge Procedure Orders   Home care nursing referral   Referral Priority: Routine Referral Type: Home Health Therapies & Aides   Number of Visits Requested: 1     Home Care PT Referral for Hospital Discharge   Referral Priority: Routine Referral Type: Home Health Therapies & Aides   Number of Visits Requested: 1     Home Care OT Referral for Hospital Discharge   Referral Priority: Routine   Number of Visits Requested: 1     MD face to face encounter   Order Comments: Documentation of Face to Face and Certification for Home Health Services    I certify that patient: Junaid Gunter is under my care and that I, or a nurse practitioner or physician's assistant working with me, had a  face-to-face encounter that meets the physician face-to-face encounter requirements with this patient on: 6/24/2020.    This encounter with the patient was in whole, or in part, for the following medical condition, which is the primary reason for home health care: complex medical care.    I certify that, based on my findings, the following services are medically necessary home health services: Nursing, Occupational Therapy and Physical Therapy.    My clinical findings support the need for the above services because: Nurse is needed: To provide assessment and oversight required in the home to assure adherence to the medical plan due to: complex medical care, Occupational Therapy Services are needed to assess and treat cognitive ability and address ADL safety due to impairment in baseline. and Physical Therapy Services are needed to assess and treat the following functional impairments: baseline.    Further, I certify that my clinical findings support that this patient is homebound (i.e. absences from home require considerable and taxing effort and are for medical reasons or [employer] services or infrequently or of short duration when for other reasons) because: Requires assistance of another person or specialized equipment to access medical services because patient: Has prohibitive pain during ambulation...    Based on the above findings. I certify that this patient is confined to the home and needs intermittent skilled nursing care, physical therapy and/or speech therapy.  The patient is under my care, and I have initiated the establishment of the plan of care.  This patient will be followed by a physician who will periodically review the plan of care.  Physician/Provider to provide follow up care: Todd Han    Attending hospital physician (the Medicare certified [facility] provider): Alan Goins MD  Physician Signature: See electronic signature associated with these discharge orders.  Date: 6/24/2020      Reason for your hospital stay   Order Comments: Abdominal pain  Back pain, chronic     Follow Up and recommended labs and tests   Order Comments: Follow up with your primary where you complete the antibiotic treatment of H. Pylori     Activity   Order Comments: Your activity upon discharge: activity as tolerated     Order Specific Question Answer Comments   Is discharge order? Yes      When to contact your care team   Order Comments: Return to the ED with fever, uncontrolled nausea, vomiting, unrelieved pain, dizziness, chest pain, shortness of breath, loss of consciousness, and any new or concerning symptoms.     Discharge Instructions   Order Comments: You were admitted for abdominal, nausea, vomiting, and low back pain. You were found to have H. Pylori through stools studies. Helicobacter pylori (H. pylori) is a type of bacteria that can cause an infection in your stomach. H. pylori may weaken the protective layer of the stomach and damage the underlying stomach cells. This can lead to the formation of stomach ulcers. It is treated with antibiotics: This medicine will help kill the H. Pylori and antiulcer medicine: This medicine helps decrease the amount of acid that is normally made by the stomach. Please complete course of treatment as discussed. Follow up with your primary in 2 weeks. Return if having worsening symptoms.     Full Code     Order Specific Question Answer Comments   Code status determined by: Discussion with patient/legal decision maker      Diet   Order Comments: Follow this diet upon discharge: Regular     Order Specific Question Answer Comments   Is discharge order? Yes           Attestation:  Bita Gunter PA-C.

## 2020-06-24 NOTE — PLAN OF CARE
- Diagnostic tests and consults completed and resulted: Not met   - Vital signs normal or at patient baseline: Met   - Tolerating oral intake to maintain hydration: Met   - Adequate pain control on oral analgesics: Met   - Returns to baseline functional status: Not met, in progress

## 2020-06-24 NOTE — PROGRESS NOTES
"SPIRITUAL HEALTH SERVICES: Tele-Encounter  Patient Location ([facility], [facility], Unit): ,[facility], [place]  Spoke with (patient, family relationship): pt's sera      Referral Source:referral by lead john.    If applicable: patient was appropriately screened for telechaplaincy support with bedside nurse prior to visit (e.g. Mental Health and Addiction contexts). See call details below.    DATA:  I managed to talk to the pt's granddaughter.She share with me,that there is no enough family support for her grand mother.She is worried, that her grandmother is suffering from dementia.Her father can't help his mother, because he is a  from state [place] state.       She said she had no problems of taking care of her, in her in her own home since she is a family,but did not welcome the idea.           She would appreciate if she would get some one to help in her home.she even share that she would turn the stove on and forget to turn it off.            I recommended, that she should ask to see a .She can relate all those questions, she would know more about grandmother getting a help.We prayed together for grandmother to get quick recovery from allah.she ended the call by saying,\"I am going to call the social worke\"r.       PLAN [facility] will follow up as needed.available for support.    Princess Tipton Intern   ______________________________    Type of service:  Telephone Visit     has received verbal consent for a TelephoneVisit from the patient? Yes    Distance Provider Location: designated [place] office or home office (secure setting)    Mode of Communication: telephone (via [employer] phone or [employer] tele-call-number ([phone]))    * [facility] remains available 24/7 for emergent requests/referrals, either by having the switchboard page the on-call  or by entering an ASAP/STAT consult in Epic (this will also page the on-call ). Routine Epic consults receive an initial " response within 24 hours.*

## 2020-06-24 NOTE — PROGRESS NOTES
OBSERVATION GOALS:    -diagnostic tests and consults completed and resulted: Met.   -vital signs normal or at patient baseline: Met.   -tolerating oral intake to maintain hydration: Met.   -adequate pain control on oral analgesics: Met.   -returns to baseline functional status: In progress.

## 2020-06-24 NOTE — PROGRESS NOTES
OBSERVATION GOALS  -diagnostic tests and consults completed and resulted--YES  -vital signs normal or at patient baseline--YES  -tolerating oral intake to maintain hydration--YES  -adequate pain control on oral analgesics--YES  -returns to baseline functional status--Nearing goal.  Needs final assessment from therapy now that MRI is complete.

## 2020-06-24 NOTE — PLAN OF CARE
OBSERVATION GOALS  -diagnostic tests and consults completed and resulted--YES  -vital signs normal or at patient baseline--YES  -tolerating oral intake to maintain hydration--YES  -adequate pain control on oral analgesics--YES  -returns to baseline functional status--Nearing goal.  Needs final assessment from therapy now that MRI is complete.      Kazakh speaker, using ipad .  Patient up with walker in hallway tonight, walking for several minutes up and down and around the hallway on 5a.  Patient very motivated to move and states she is sick of lying in bed.  Took tylenol and oxycodone at bedtime.  Family (Pearl) called to check on her.  States she has a lot of trouble sleeping at night at home and would like us to try something here if possible to see if that helps.  Patient had prn Melatonin and was agreeable to this so given.  Continue to monitor.      Patient refused IVF at HS.  Drinking adequate amounts po fluid.  Saline locked.

## 2020-06-24 NOTE — PROGRESS NOTES
All goals met for discharge. Interpretor service via Ipad in use for discharge. Discharge instructions given to patient and all questions answered. Patient able to verbalize understanding of instructions. PIV discontinued. All belongings with patient. Discharge medication given to patient. Patient waiting for family to arrive to provide ride home. Patient will discharge to home at this time.

## 2020-06-24 NOTE — PROGRESS NOTES
[facility], [place]     Neurosurgery Brief Note:     Assessment: Junaid Gunter is a 70 year old female with osteoporosis, hypertension, and hep C, who presented with non-specific back pain that radiates to lower extremities. No change in neuro exam.   Evaluated MRI with attending, shows disc herniation into superior endplate of T7. No neurosurgical intervention at this time.        Plan:  - Use figure of 8 brace if having pain   - Follow-up with [facility] (Ne[facility]surgery Clinic) in 6 weeks with AP and lateral thoracic spine XR   - Neurosurgery will sign-off

## 2020-06-24 NOTE — PROGRESS NOTES
Social Work Services Progress Note    Hospital Day: 4  Date of Initial Social Work Evaluation:  6/23/20  Collaborated with:  TCU Admissions     Data:  MOMO followed up on referrals that were sent yesterday to TCU.   MOMO called [facility] [phone] - not accepting admissions.   MOMO called [facility] [phone] - spoke with admissions, still reviewing referral and will call SW back shortly.     Addend 1110: MOMO called granddaughter Jalen [phone] to obtain additional referrals. Jalen had questions regarding increasing PCA hours. Informed Jalen that pt will require a county assessment for those hours. Jalen said she is agreeable to appropriate referrals for pt.   Jalen was agreeable to referrals to   - [facility] [phone] f:[phone] - referral sent via [facility]   - [facility] [phone] f:[phone] - referral sent via Epic    Addend 5880:   MOMO received update from [facility] - no available beds.  MOMO received update from [facility] - no available beds.   MOMO left general VM with Jalen.     SW spoke with Darrylmelyssa, she is agreeable to additional referrals to be sent.   MOM[facility]   [facility]  - [facility]    SW received phone call from Jalen. Jalen reports pt and family now prefer her to return home because they are concerned about COVID. MOMO educated Jalen on COVID free facilities, however family still declining.   MOMO updated RNCC and OBS DAVID.       Intervention:  Discharge planning.     Assessment:  Pt and family wish to return home now.     Plan:    Anticipated Disposition:  Facility:  TBD    Barriers to d/c plan:  Accepting facility     Follow Up:  MOMO to continue to follow for discharge planning needs    Karlei Mae, LASHAY, LGSW  6D Adult Acute Care MOM[facility]  Ph:[phone]  Pager [phone]  Unit [phone]

## 2020-06-24 NOTE — PLAN OF CARE
/59 (BP Location: Left arm)   Pulse 82   Temp 97.9  F (36.6  C) (Oral)   Resp 18   SpO2 98%     OBSERVATION GOALS:     -diagnostic tests and consults completed and resulted: Met.   -vital signs normal or at patient baseline: Met.   -tolerating oral intake to maintain hydration: Met.   -adequate pain control on oral analgesics: Met.   -returns to baseline functional status: In progress.           Time: 6502-4929.  Reason for admission: Abdominal pain and back pain.     VS: VSS on RA with O2 sats in high 90s. Afebrile.   Activity: Up independently in room with walker, steady on feet. Calls appropriately.   Neuros: A&Ox4. Neuros intact. Denies pain. Bur[place]ian speaking, Ipad  used.   Cardiac: WDL. Normotensive, 130-140s/50-60s. HR stable in 80s. Denies chest pain.   Respiratory: WDL. LS clear. Denies SOB or ANDUJAR. Stable on RA. No cough noted.   GI/: Voiding without difficulty. No BM on this shift. +BS, +gas. Denies nausea or vomiting.   Diet: Regular diet, tolerating well.   Skin: WDL.   Lines: Right PIV SL'd.   Labs: WDL.     New changes this shift/Plan: VSS on RA. A&Ox4, Bur[place]ian speaking. Denies pain. Voiding well, tolerating diet. Up with walker. Planning to D/C to TCU when bed available.   Will continue to monitor & follow POC.

## 2020-06-24 NOTE — PROGRESS NOTES
Patient seen this morning on ED obs.   not available during my visit with her. Appears comfortable. No abd tenderness. Plan for TCU today if bed available. follow up neurosurgery regarding T7 compression fracture.

## 2020-06-24 NOTE — PROGRESS NOTES
Care Coordinator Progress Note    Admission Date/Time:  6/21/2020  Attending MD:  Alan Goins MD    Data  Chart reviewed, discussed with interdisciplinary team.   Patient was admitted for: Nausea and vomiting, intractability of vomiting not specified, unspecified vomiting type.    Coordination of Care and Referrals: Provided patient/family with options for Home Care.        Assessment  D: Plan of care discussed with Medical Team at Interdisciplinary Rounds, plan for patient to discharge TBD.      I/A: Chart reviewed; spoke with patient's granddaughter via phone and introduced role. Writer confirmed home support, living arrangements and transportation. Patient lives alone in an apartment and has 2 hours of PCA services a day. Granddaughter confirmed patient has no support system that lives local and would not have anyone to call if she needed help.    Writer discussed home care services. Granddaughter was hoping she would be able to get daily nursing visits and daily therapy visits. Writer discussed that daily services from both is not feasible and patient likely needs more help at home after discharge. Granddaugher was going to call her father and call writer back.     Addendum 1500: Daughter called back and they would like to pursue a discharge to home with home care (knowing it isn't every day of the week). Granddaughter stated she would be able to be available 24 hours after discharge, and as needed moving forward. Grandsilverughter lives in [place], [place].     Choice of home care agencies was provided noting Medicare.gov with star ratings. Granddaughter would like referral sent to [facility] for RN, PT, OT, HHA. Referral sent and orders placed.     Addendum 1545: Daughter called and stated she is unable to pick patient up from the hospital. Writer unsure if patient is able to get up to her apartment independently and called granddaughter back. Granddaughter stated she needed to drop her child off with family and  then would be able to provide transportation for patient. Granddaughter plans to arrive at the hospital around 6 PM. This was communicated to bedside RN.    P: No further RNCC needs at this time. Care Coordinator will remain available for discharge needs that may arise.    Plan  Anticipated Discharge Date:  TBD  Anticipated Discharge Plan:  Home vs TCU    Rachel Raymond RN, A  Care Coordinator  Phone: [phone] Pager: [phone]  [facility]     To contact Weekend RNCC, page [phone]

## 2020-06-25 ENCOUNTER — TELEPHONE (OUTPATIENT)
Dept: FAMILY MEDICINE | Facility: CLINIC | Age: 70
End: 2020-06-25

## 2020-06-25 ENCOUNTER — APPOINTMENT (OUTPATIENT)
Dept: INTERPRETER SERVICES | Facility: CLINIC | Age: 70
End: 2020-06-25
Payer: COMMERCIAL

## 2020-06-26 NOTE — TELEPHONE ENCOUNTER
ED / Discharge Outreach Protocol    Patient Contact    Attempt # 2    Was call answered?  No.  Left message on voicemail with information to call triage back.

## 2020-06-29 NOTE — TELEPHONE ENCOUNTER
Pt was called using language line. Pt reports she was advised to follow up with PCP after discharge. Triage offered an office visit at [facility]. Pt states she wants to see PCP at [facility]. Writer doen't have access to PCP's schedule. Advised she follow up with [facility] re: ED follow up appt. Pt seemed confused of who her primary doctor was.     Writer called [facility] [phone]. Clinic's voicemail is full. Unable to leave a message.    Triage called emergency contact [place] [phone]. Granddaughter is aware follow up appt is needed and agreed to help pt schedule a follow up appt with her primary doctor. No further cation needed from triage at this time.

## 2020-07-02 ENCOUNTER — TELEPHONE (OUTPATIENT)
Dept: CARE COORDINATION | Facility: CLINIC | Age: 70
End: 2020-07-02

## 2020-07-02 NOTE — TELEPHONE ENCOUNTER
Encounter created in error, provider is not in [facility] system.    Stephanie Claudio OTR/L  [facility]  [email or web address]

## 2020-08-05 ENCOUNTER — VIRTUAL VISIT (OUTPATIENT)
Dept: NEUROSURGERY | Facility: CLINIC | Age: 70
End: 2020-08-05
Payer: COMMERCIAL

## 2020-08-05 ENCOUNTER — TELEPHONE (OUTPATIENT)
Dept: NEUROSURGERY | Facility: CLINIC | Age: 70
End: 2020-08-05

## 2020-08-05 DIAGNOSIS — S22.069D CLOSED FRACTURE OF SEVENTH THORACIC VERTEBRA WITH ROUTINE HEALING, UNSPECIFIED FRACTURE MORPHOLOGY, SUBSEQUENT ENCOUNTER: Primary | ICD-10-CM

## 2020-08-05 NOTE — PROGRESS NOTES
"Junaid Gunter is a 70 year old female who is being evaluated via a billable telephone visit.    This is a video/telephone visit conducted [facility] on non-urgent clinic visits due to risk of the spread of COVID-19 pandemic virus. The patient did not identify any urgent or red-flag symptoms that would require in-person evaluation.    The patient has been notified of following:     \"This telephone visit will be conducted via a call between you and your physician/provider. We have found that certain health care needs can be provided without the need for a physical exam.  This service lets us provide the care you need with a short phone conversation.  If a prescription is necessary we can send it directly to your pharmacy.  If lab work is needed we can place an order for that and you can then stop by our lab to have the test done at a later time.    Telephone visits are billed at different rates depending on your insurance coverage. During this emergency period, for some insurers they may be billed the same as an in-person visit.  Please reach out to your insurance provider with any questions.    If during the course of the call the physician/provider feels a telephone visit is not appropriate, you will not be charged for this service.\"    Patient has given verbal consent for Telephone visit?  yes    What phone number would you like to be contacted at? [phone]    How would you like to obtain your AVS? [email or web address]    Reason for Visit:          Hospital Follow Up for T7 fracture        History of Presenting Illness:  Junaid Gunter is a 70 year old female with PMH Of Hepatitis C, Hypertension, Hypothyroidism, Osteoporosis, and chronic iron deficiency anemia who presented to ER on 6/21/20 with abdominal pain and back pain.   Patient c/o diffuse back pain that has been worsening in the past 2-3 months with radiation to her legs, difficulty ambulating.   CT imaging " "showed T7 compression fracture. No urinary or bowel incontinence.  Neurosurgery was consulted and recommended T/L MRI to rule out malignancy. Orthotics consult.  MRI completed, demonstrating chronic compression deformity of the T7 vertebral body with mild vertebral body height loss and associated acute to subacute large intravertebral disc herniation/Schmorl's node in the superior endplate.    PT/OT wasconsulted and recommended TCU.   Patient and family are agreeable yesterday, however decided today that patient would benefit discharging home due to COVID-19 concerns.   Therefore patient discharged home.     Today- a professional Joberator  is utilized for telephone visit     Today,   Back pain -   Still has pain.   She has pain in \"every part of my body\".    Back pain is \"excruciating pain\"  She has pain in \"my arms and my legs\"   She is wearing her spine brace    The spine brace is helpful for walking, but does not really improve her back pain  She is ambulating with a cane, but she feels she needs more assistance to walk (wheelchair)   She is not working with therapy  She had a nurse coming to assist with medication, but the nurse is no longer coming  She has been taking her medication her own, but she is not sure if she is taking them correctly  No bowel or bladder concerns or complaints       Current Outpatient Medications   Medication     ACETAMINOPHEN EXTRA STRENGTH 500 MG tablet     aspirin (ASA) 81 MG EC tablet     diclofenac (VOLTAREN) 1 % topical gel     ferrous gluconate (FERGON) 324 (38 Fe) MG tablet     gabapentin (NEURONTIN) 300 MG capsule     losartan (COZAAR) 100 MG tablet     SENNA-LAX 8.6 MG tablet     thyroid (ARMOUR) 15 MG tablet     tiZANidine (ZANAFLEX) 4 MG tablet     vitamin D3 (CHOLECALCIFEROL) 2000 units (50 mcg) tablet     No current facility-administered medications for this visit.        Examination:   General    Alert, cooperative.  No acute distress  Pulmonary:   Breathing " comfortably on room air. No cough, or shortness   Speech is fluent      There were no vitals taken for this visit.    Neurological Assessment:     General Appearance: Awake; Well-Nourished; Pleasant; In no apparent distress  Mental Status: Alert and Oriented to Person, Place, Time and Situation  Speech: Fluent; No aphasia or dysarthria        IMAGING:  MR THORACIC SPINE 6/23/20                                                                   Impression:   1. Chronic compression deformity of the T7 vertebral body with mild  vertebral body height loss and associated acute to subacute large  intravertebral disc herniation/Schmorl's node in the superior  endplate.   2. Multilevel lumbar spondylosis most significant for a severe  stenosis at the level of L4-L5.  3. Grade 1 anterolisthesis of L5 on S1.      I have personally reviewed the examination and initial interpretation  and I agree with the findings.     JAMAL CRAVEN MD    MR OF LUMBAR SPINE 6/23/20   Impression:   1. Chronic compression deformity of the T7 vertebral body with mild  vertebral body height loss and associated acute to subacute large  intravertebral disc herniation/Schmorl's node in the superior  endplate.   2. Multilevel lumbar spondylosis most significant for a severe  stenosis at the level of L4-L5.  3. Grade 1 anterolisthesis of L5 on S1.      I have personally reviewed the examination and initial interpretation  and I agree with the findings.     JAMAL CRAVEN MD      Assessment:    Chronic compression deformity of T7       Plan:   ~Continue in spine brace at all times including when upright and out of bed  ~Continue activity and lifting restrictions with no lifting greater than 8-10 lbs  ~Avoid significant twisting, or bending of the spine  ~Will obtain AP/Lateral thoracic spine xray to check her fracture   ~Continue your medication regimen  ~Will have our LPN assist and see if she is able to obtain home care and/or home therapies   ~Return  to Neurosurgery Clinic in 6 weeks with another AP/Lateral thoracic spine xray at that time,   If that xray shows stable fx, likely will not require further xray imaging.      At the end of the visit, all the patient's questions and concerns had been addressed and the patient was agreeable with the plan of care as outlined above. The patient has our office contact information at [phone], and knows to call with any questions, concerns, or changes in condition.        Dafne Valdes DNP  Neurosurgery Nurse Practitioner  [facility]  [phone]    Phone call duration: approx 22 minutes

## 2020-08-05 NOTE — PATIENT INSTRUCTIONS
~Continue in spine brace at all times including when upright and out of bed  ~Continue activity and lifting restrictions with no lifting greater than 8-10 lbs  ~Avoid significant twisting, or bending of the spine  ~Will obtain AP/Lateral thoracic spine xray to check her fracture   ~Continue your medication regimen  ~Will have our LPN assist and see if she is able to obtain home care and/or home therapies   ~Return to Neurosurgery Clinic in 6 weeks with another AP/Lateral thoracic spine xray at that time,   If that xray shows stable fx, likely will not require further xray imaging.      At the end of the visit, all the patient's questions and concerns had been addressed and the patient was agreeable with the plan of care as outlined above. The patient has our office contact information at [phone], and knows to call with any questions, concerns, or changes in condition.

## 2020-08-05 NOTE — LETTER
"8/5/2020       RE: Junaid Gunter  [address]  [address]  [place] [address]     Dear Colleague,    Thank you for referring your patient, Junaid Gunter, to the [facility] NEUROSURGERY at [facility]. Please see a copy of my visit note below.    Junaid Gunter is a 70 year old female who is being evaluated via a billable telephone visit.    This is a video/telephone visit conducted [facility] restrictions on non-urgent clinic visits due to risk of the spread of COVID-19 pandemic virus. The patient did not identify any urgent or red-flag symptoms that would require in-person evaluation.      Reason for Visit:          Hospital Follow Up for T7 fracture        History of Presenting Illness:  Junaid Gunter is a 70 year old female with PMH Of Hepatitis C, Hypertension, Hypothyroidism, Osteoporosis, and chronic iron deficiency anemia who presented to ER on 6/21/20 with abdominal pain and back pain.   Patient c/o diffuse back pain that has been worsening in the past 2-3 months with radiation to her legs, difficulty ambulating.   CT imaging showed T7 compression fracture. No urinary or bowel incontinence.  Neurosurgery was consulted and recommended T/L MRI to rule out malignancy. Orthotics consult.  MRI completed, demonstrating chronic compression deformity of the T7 vertebral body with mild vertebral body height loss and associated acute to subacute large intravertebral disc herniation/Schmorl's node in the superior endplate.    PT/OT wasconsulted and recommended TCU.   Patient and family are agreeable yesterday, however decided today that patient would benefit discharging home due to COVID-19 concerns.   Therefore patient discharged home.     Today- a professional Malawian  is utilized for telephone visit     Today,   Back pain -   Still has pain.   She has pain in \"every part of my body\".    Back pain is \"excruciating pain\"  " "She has pain in \"my arms and my legs\"   She is wearing her spine brace    The spine brace is helpful for walking, but does not really improve her back pain  She is ambulating with a cane, but she feels she needs more assistance to walk (wheelchair)   She is not working with therapy  She had a nurse coming to assist with medication, but the nurse is no longer coming  She has been taking her medication her own, but she is not sure if she is taking them correctly  No bowel or bladder concerns or complaints       Current Outpatient Medications   Medication     ACETAMINOPHEN EXTRA STRENGTH 500 MG tablet     aspirin (ASA) 81 MG EC tablet     diclofenac (VOLTAREN) 1 % topical gel     ferrous gluconate (FERGON) 324 (38 Fe) MG tablet     gabapentin (NEURONTIN) 300 MG capsule     losartan (COZAAR) 100 MG tablet     SENNA-LAX 8.6 MG tablet     thyroid (ARMOUR) 15 MG tablet     tiZANidine (ZANAFLEX) 4 MG tablet     vitamin D3 (CHOLECALCIFEROL) 2000 units (50 mcg) tablet     No current facility-administered medications for this visit.        Examination:   General    Alert, cooperative.  No acute distress  Pulmonary:   Breathing comfortably on room air. No cough, or shortness   Speech is fluent      There were no vitals taken for this visit.    Neurological Assessment:     General Appearance: Awake; Well-Nourished; Pleasant; In no apparent distress  Mental Status: Alert and Oriented to Person, Place, Time and Situation  Speech: Fluent; No aphasia or dysarthria        IMAGING:  MR THORACIC SPINE 6/23/20                                                                   Impression:   1. Chronic compression deformity of the T7 vertebral body with mild  vertebral body height loss and associated acute to subacute large  intravertebral disc herniation/Schmorl's node in the superior  endplate.   2. Multilevel lumbar spondylosis most significant for a severe  stenosis at the level of L4-L5.  3. Grade 1 anterolisthesis of L5 on S1.      I " have personally reviewed the examination and initial interpretation  and I agree with the findings.     JAMAL CRAVEN MD    MR OF LUMBAR SPINE 6/23/20   Impression:   1. Chronic compression deformity of the T7 vertebral body with mild  vertebral body height loss and associated acute to subacute large  intravertebral disc herniation/Schmorl's node in the superior  endplate.   2. Multilevel lumbar spondylosis most significant for a severe  stenosis at the level of L4-L5.  3. Grade 1 anterolisthesis of L5 on S1.      I have personally reviewed the examination and initial interpretation  and I agree with the findings.     JAMAL CRAVEN MD      Assessment:    Chronic compression deformity of T7       Plan:   ~Continue in spine brace at all times including when upright and out of bed  ~Continue activity and lifting restrictions with no lifting greater than 8-10 lbs  ~Avoid significant twisting, or bending of the spine  ~Will obtain AP/Lateral thoracic spine xray to check her fracture   ~Continue your medication regimen  ~Will have our LPN assist and see if she is able to obtain home care and/or home therapies   ~Return to Neurosurgery Clinic in 6 weeks with another AP/Lateral thoracic spine xray at that time,   If that xray shows stable fx, likely will not require further xray imaging.      At the end of the visit, all the patient's questions and concerns had been addressed and the patient was agreeable with the plan of care as outlined above. The patient has our office contact information at [phone], and knows to call with any questions, concerns, or changes in condition.        Dafne Valdes DNP  Neurosurgery Nurse Practitioner  [facility]  [phone]    Phone call duration: approx 22 minutes

## 2020-08-17 ENCOUNTER — TELEPHONE (OUTPATIENT)
Dept: NEUROSURGERY | Facility: CLINIC | Age: 70
End: 2020-08-17

## 2020-09-01 ENCOUNTER — TELEPHONE (OUTPATIENT)
Dept: NEUROSURGERY | Facility: CLINIC | Age: 70
End: 2020-09-01

## 2020-09-15 ENCOUNTER — VIRTUAL VISIT (OUTPATIENT)
Dept: NEUROSURGERY | Facility: CLINIC | Age: 70
End: 2020-09-15
Payer: COMMERCIAL

## 2020-09-15 DIAGNOSIS — S22.069D CLOSED FRACTURE OF SEVENTH THORACIC VERTEBRA WITH ROUTINE HEALING, UNSPECIFIED FRACTURE MORPHOLOGY, SUBSEQUENT ENCOUNTER: Primary | ICD-10-CM

## 2020-09-15 NOTE — LETTER
9/15/2020       RE: Junaid Gunter  [address]  [address]  [place] [facility] [address]     Dear Colleague,    Thank you for referring your patient, Junaid Gunter, to the [facility] NEUROSURGERY at [facility]. Please see a copy of my visit note below.    Patient not available for appointment.    received voice mail.   Patient also did not obtain thoracic spine xray prior to appointment.   Needs to re-schedule xray, with follow up telephone appointment.       Again, thank you for allowing me to participate in the care of your patient.  Sincerely,    Dafne Valdes, EARLE  Neurosurgery Nurse Practitioner  [facility] [place]  [phone]

## 2020-09-15 NOTE — LETTER
9/15/2020       RE: Junaid Gunter  [address]  [address]  [facility] [address]     Dear Colleague,    Thank you for referring your patient, Junaid Gunter, to the [facility] NEUROSURGERY at [facility]. Please see a copy of my visit note below.    Patient not available for appointment.    received voice mail.   Patient also did not obtain thoracic spine xray prior to appointment.   Needs to re-schedule xray, with follow up telephone appointment.     Dafne Valdes DNP  Neurosurgery Nurse Practitioner  [facility]  [phone]      Again, thank you for allowing me to participate in the care of your patient.      Sincerely,    PATITO Lynne CNP

## 2020-09-15 NOTE — PROGRESS NOTES
Patient not available for appointment.    received voice mail.   Patient also did not obtain thoracic spine xray prior to appointment.   Needs to re-schedule xray, with follow up telephone appointment.     Dafne Valdes DNP  Neurosurgery Nurse Practitioner  [facility]  [phone]

## 2020-09-22 ENCOUNTER — TELEPHONE (OUTPATIENT)
Dept: NEUROSURGERY | Facility: CLINIC | Age: 70
End: 2020-09-22

## 2020-10-06 ENCOUNTER — TELEPHONE (OUTPATIENT)
Dept: NEUROSURGERY | Facility: CLINIC | Age: 70
End: 2020-10-06

## 2021-03-01 ENCOUNTER — OFFICE VISIT (OUTPATIENT)
Dept: NEUROSURGERY | Facility: CLINIC | Age: 71
End: 2021-03-01
Attending: NURSE PRACTITIONER
Payer: COMMERCIAL

## 2021-03-01 ENCOUNTER — HOSPITAL ENCOUNTER (OUTPATIENT)
Dept: CT IMAGING | Facility: CLINIC | Age: 71
End: 2021-03-01
Attending: NURSE PRACTITIONER
Payer: COMMERCIAL

## 2021-03-01 VITALS
WEIGHT: 171 LBS | OXYGEN SATURATION: 100 % | BODY MASS INDEX: 34.47 KG/M2 | SYSTOLIC BLOOD PRESSURE: 127 MMHG | DIASTOLIC BLOOD PRESSURE: 77 MMHG | HEIGHT: 59 IN | HEART RATE: 77 BPM

## 2021-03-01 DIAGNOSIS — M54.16 LUMBAR RADICULOPATHY: ICD-10-CM

## 2021-03-01 DIAGNOSIS — S22.069D CLOSED FRACTURE OF SEVENTH THORACIC VERTEBRA WITH ROUTINE HEALING, UNSPECIFIED FRACTURE MORPHOLOGY, SUBSEQUENT ENCOUNTER: ICD-10-CM

## 2021-03-01 DIAGNOSIS — S22.069D CLOSED FRACTURE OF SEVENTH THORACIC VERTEBRA WITH ROUTINE HEALING, UNSPECIFIED FRACTURE MORPHOLOGY, SUBSEQUENT ENCOUNTER: Primary | ICD-10-CM

## 2021-03-01 PROCEDURE — G0463 HOSPITAL OUTPT CLINIC VISIT: HCPCS | Mod: 25

## 2021-03-01 PROCEDURE — 99204 OFFICE O/P NEW MOD 45 MIN: CPT | Performed by: NURSE PRACTITIONER

## 2021-03-01 PROCEDURE — 72128 CT CHEST SPINE W/O DYE: CPT

## 2021-03-01 ASSESSMENT — PAIN SCALES - GENERAL: PAINLEVEL: WORST PAIN (10)

## 2021-03-01 ASSESSMENT — MIFFLIN-ST. JEOR: SCORE: 1196.28

## 2021-03-01 NOTE — NURSING NOTE
"March 1, 2021 10:59 AM   Junaid Gunter is a 71 year old female who presents for:    Chief Complaint   Patient presents with     Consult     low back pain and b/l leg pain      Initial Vitals: /77   Pulse 77   Ht 4' 11\" (1.499 m)   Wt 171 lb (77.6 kg)   SpO2 100%   BMI 34.54 kg/m   Estimated body mass index is 34.54 kg/m  as calculated from the following:    Height as of this encounter: 4' 11\" (1.499 m).    Weight as of this encounter: 171 lb (77.6 kg). Body surface area is 1.8 meters squared.  Worst Pain (10) Comment: Data Unavailable       Clinical concerns: Junaid Gunter is here today for low back pain and b/l leg pain.    Aries Espinoza MA  "

## 2021-03-01 NOTE — PATIENT INSTRUCTIONS
Order for thoracic spine CT to evaluate fracture. You can schedule at the  or call [phone]. I will call with the results.    Continue wearing the brace as instructed.     Order for a lumbar epidural steroid injection. They will call you to schedule.     Please call our clinic if symptoms persist, change, or worsen at any time.    Julia Calvin CNP  [facility] Neurosurgery  [address] [address]  Tel [phone]  Pager [phone]

## 2021-03-01 NOTE — LETTER
"    3/1/2021         RE: Junaid Gunter  [address]  [address]  [facility] [address]        Dear Colleague,    Thank you for referring your patient, Junaid Gunter, to the [facility] NEUROSURGERY CLINIC [place]. Please see a copy of my visit note below.    Dr. Sravan Woods  [facility] Neurosurgery Clinic Visit      CC: back and leg pain    Primary care Provider: Todd Han    Reason For Visit:   Self referral, follow-up of chronic back and leg pain, T7 fracture       HPI: Junaid Gunter is a 71 year old female who presents for evaluation of back pain and leg pain. She had follow-up with the [facility] Neurosurgery clinic on 8/5/2020 for her T7 fracture. At that time, it was recommended patient continue with brace and follow-up in 6 weeks with thoracic spine xray. Patient did not have follow-up xray or visit with their clinic though. Today, she reports continued back pain and bilateral leg pain she describes as \"burning\". Denies any paresthesias or weakness. Pain worsens with walking. She ambulates with a cane. She takes Gabapentin and Tylenol for pain management. She has not been wearing her brace because she states it causes discomfort. She has not had any injections. She does receive home care services/therapies. Denies any falls or bladder/bowel incontinence.    Past Medical History:   Diagnosis Date     DJD (degenerative joint disease)      HCV (hepatitis C virus)      Hypertension      Osteoporosis        Past Medical History reviewed with patient during visit.  Past Surgical History reviewed with patient during visit.    Current Outpatient Medications   Medication     ACETAMINOPHEN EXTRA STRENGTH 500 MG tablet     aspirin (ASA) 81 MG EC tablet     diclofenac (VOLTAREN) 1 % topical gel     ferrous gluconate (FERGON) 324 (38 Fe) MG tablet     gabapentin (NEURONTIN) 300 MG capsule     losartan (COZAAR) 100 MG tablet     SENNA-LAX 8.6 MG tablet     thyroid (ARMOUR) 15 MG tablet     tiZANidine " "(ZANAFLEX) 4 MG tablet     vitamin D3 (CHOLECALCIFEROL) 2000 units (50 mcg) tablet     No current facility-administered medications for this visit.        Allergies   Allergen Reactions     Egg [Chicken-Derived Products (Egg)] Itching       Social History     Socioeconomic History     Marital status:      Spouse name: Not on file     Number of children: Not on file     Years of education: Not on file     Highest education level: Not on file   Occupational History     Not on file   Social Needs     Financial resource strain: Not on file     Food insecurity     Worry: Not on file     Inability: Not on file     Transportation needs     Medical: Not on file     Non-medical: Not on file   Tobacco Use     Smoking status: Never Smoker     Smokeless tobacco: Never Used   Substance and Sexual Activity     Alcohol use: Never     Frequency: Never     Drug use: Never     Sexual activity: Not Currently   Lifestyle     Physical activity     Days per week: Not on file     Minutes per session: Not on file     Stress: Not on file   Relationships     Social connections     Talks on phone: Not on file     Gets together: Not on file     Attends Restoration service: Not on file     Active member of club or organization: Not on file     Attends meetings of clubs or organizations: Not on file     Relationship status: Not on file     Intimate partner violence     Fear of current or ex partner: Not on file     Emotionally abused: Not on file     Physically abused: Not on file     Forced sexual activity: Not on file   Other Topics Concern     Not on file   Social History Narrative     Not on file       Family History   Problem Relation Age of Onset     Depression Sister        ROS: 10 point ROS neg other than the symptoms noted above in the HPI.    Vital Signs: /77   Pulse 77   Ht 4' 11\" (1.499 m)   Wt 171 lb (77.6 kg)   SpO2 100%   BMI 34.54 kg/m      Examination:  Constitutional:  Alert, well nourished, NAD.  HEENT: " Normocephalic, atraumatic.   Pulmonary:  Without shortness of breath, normal effort.   Lymph: No lymphadenopathy to low back or LE.   Cardiovascular:  No pitting edema of BLE.      Neurological:  Awake  Alert  Oriented x 3  Speech clear  Cranial nerves II - XII grossly intact  PERRL  EOMI  Face symmetric  Tongue midline  Motor exam   Shoulder Abduction:  Right:  5/5   Left:  5/5  Biceps:                      Right:  5/5   Left:  5/5  Triceps:                     Right:  5/5   Left:  5/5  Wrist Extensors:        Right:  5/5   Left:  5/5  Wrist Flexors:            Right:  5/5   Left:  5/5  Intrinsics:                   Right:  5/5   Left:  5/5  Hip Flexor:                 Right: 5/5  Left:  5/5  Quadriceps:               Right:  5/5  Left:  5/5  Hamstrings:               Right:  5/5  Left:  5/5  Gastroc Soleus:         Right:  5/5  Left:  5/5  Tib/Ant:                      Right:  5/5  Left:  5/5  EHL:                          Right:  5/5  Left:  5/5         Sensation normal to bilateral upper and lower extremities.    Reflexes are 2+ in the patellar and Achilles. There is no clonus.    Musculoskeletal:  Gait: Able to stand from a seated position. Ambulates with a cane. Non-antalgic, non-myelopathic gait.     Examination reveals tenderness to palpation of the thoracic and lumbar spine. Straight leg raise is negative bilaterally.      Imaging:   MRI thoracic and lumbar spine from 6/23/20 reviewed in clinic.     Impression:   1. Chronic compression deformity of the T7 vertebral body with mild vertebral body height loss and associated acute to subacute large intravertebral disc herniation/Schmorl's node in the superior  endplate.   2. Multilevel lumbar spondylosis most significant for a severe stenosis at the level of L4-L5.  3. Grade 1 anterolisthesis of L5 on S1.     Assessment/Plan:   Chronic T7 compression fracture  Back pain  Lumbar radiculopathy  Lumbar stenosis     71 year old female who presents for evaluation of  "back pain and leg pain. She had follow-up with the [facility] Neurosurgery clinic on 8/5/2020 for her T7 compression fracture. At that time, it was recommended patient continue with brace and follow-up in 6 weeks with thoracic spine xray. Patient did not have follow-up xray or visit with their clinic though. Today, she reports continued back pain and bilateral leg pain she describes as \"burning\". Denies any paresthesias or weakness. MRI thoracic spine and MRI lumbar spine from 6/23/20 reviewed in clinic. Reveals chronic compression fracture at T7, as well as severe stenosis at L4-5. Discussed options with patient. She would like to try a lumbar VIC at this time.     - Recommend CT thoracic spine to evaluate fracture. I will call with the results.   - Continue to wear brace as instructed. Will place referral to Orthotics for fitting/adjustment since patient reports brace is uncomfortable.   - Referral to pain management for L4-5 VIC  - Follow up in clinic if symptoms persist or worsen      services utilized for clinic visit. Advised patient to call our clinic with any questions or concerns. Discussed red flag symptoms and advised to seek medical attention if these develop. Patient voiced understanding and agreement.        Julia Calvin CNP  [facility] [place] [address]  Tel [phone]  Pager [phone]        Again, thank you for allowing me to participate in the care of your patient.        Sincerely,        Julia Calvin, LANEY    "

## 2021-03-01 NOTE — PROGRESS NOTES
"Dr. Sravan Woods  [facility] Neurosurgery Clinic Visit      CC: back and leg pain    Primary care Provider: Todd Han    Reason For Visit:   Self referral, follow-up of chronic back and leg pain, T7 fracture       HPI: Junaid Gunter is a 71 year old female who presents for evaluation of back pain and leg pain. She had follow-up with the [facility] Neurosurgery clinic on 8/5/2020 for her T7 fracture. At that time, it was recommended patient continue with brace and follow-up in 6 weeks with thoracic spine xray. Patient did not have follow-up xray or visit with their clinic though. Today, she reports continued back pain and bilateral leg pain she describes as \"burning\". Denies any paresthesias or weakness. Pain worsens with walking. She ambulates with a cane. She takes Gabapentin and Tylenol for pain management. She has not been wearing her brace because she states it causes discomfort. She has not had any injections. She does receive home care services/therapies. Denies any falls or bladder/bowel incontinence.    Past Medical History:   Diagnosis Date     DJD (degenerative joint disease)      HCV (hepatitis C virus)      Hypertension      Osteoporosis        Past Medical History reviewed with patient during visit.  Past Surgical History reviewed with patient during visit.    Current Outpatient Medications   Medication     ACETAMINOPHEN EXTRA STRENGTH 500 MG tablet     aspirin (ASA) 81 MG EC tablet     diclofenac (VOLTAREN) 1 % topical gel     ferrous gluconate (FERGON) 324 (38 Fe) MG tablet     gabapentin (NEURONTIN) 300 MG capsule     losartan (COZAAR) 100 MG tablet     SENNA-LAX 8.6 MG tablet     thyroid (ARMOUR) 15 MG tablet     tiZANidine (ZANAFLEX) 4 MG tablet     vitamin D3 (CHOLECALCIFEROL) 2000 units (50 mcg) tablet     No current facility-administered medications for this visit.        Allergies   Allergen Reactions     Egg [Chicken-Derived Products (Egg)] Itching       Social History " "    Socioeconomic History     Marital status:      Spouse name: Not on file     Number of children: Not on file     Years of education: Not on file     Highest education level: Not on file   Occupational History     Not on file   Social Needs     Financial resource strain: Not on file     Food insecurity     Worry: Not on file     Inability: Not on file     Transportation needs     Medical: Not on file     Non-medical: Not on file   Tobacco Use     Smoking status: Never Smoker     Smokeless tobacco: Never Used   Substance and Sexual Activity     Alcohol use: Never     Frequency: Never     Drug use: Never     Sexual activity: Not Currently   Lifestyle     Physical activity     Days per week: Not on file     Minutes per session: Not on file     Stress: Not on file   Relationships     Social connections     Talks on phone: Not on file     Gets together: Not on file     Attends Pent[employer]al service: Not on file     Active member of club or organization: Not on file     Attends meetings of clubs or organizations: Not on file     Relationship status: Not on file     Intimate partner violence     Fear of current or ex partner: Not on file     Emotionally abused: Not on file     Physically abused: Not on file     Forced sexual activity: Not on file   Other Topics Concern     Not on file   Social History Narrative     Not on file       Family History   Problem Relation Age of Onset     Depression Sister        ROS: 10 point ROS neg other than the symptoms noted above in the HPI.    Vital Signs: /77   Pulse 77   Ht 4' 11\" (1.499 m)   Wt 171 lb (77.6 kg)   SpO2 100%   BMI 34.54 kg/m      Examination:  Constitutional:  Alert, well nourished, NAD.  HEENT: Normocephalic, atraumatic.   Pulmonary:  Without shortness of breath, normal effort.   Lymph: No lymphadenopathy to low back or LE.   Cardiovascular:  No pitting edema of BLE.      Neurological:  Awake  Alert  Oriented x 3  Speech clear  Cranial nerves II - XII " grossly intact  PERRL  EOMI  Face symmetric  Tongue midline  Motor exam   Shoulder Abduction:  Right:  5/5   Left:  5/5  Biceps:                      Right:  5/5   Left:  5/5  Triceps:                     Right:  5/5   Left:  5/5  Wrist Extensors:        Right:  5/5   Left:  5/5  Wrist Flexors:            Right:  5/5   Left:  5/5  Intrinsics:                   Right:  5/5   Left:  5/5  Hip Flexor:                 Right: 5/5  Left:  5/5  Quadriceps:               Right:  5/5  Left:  5/5  Hamstrings:               Right:  5/5  Left:  5/5  Gastroc Soleus:         Right:  5/5  Left:  5/5  Tib/Ant:                      Right:  5/5  Left:  5/5  EHL:                          Right:  5/5  Left:  5/5         Sensation normal to bilateral upper and lower extremities.    Reflexes are 2+ in the patellar and Achilles. There is no clonus.    Musculoskeletal:  Gait: Able to stand from a seated position. Ambulates with a cane. Non-antalgic, non-myelopathic gait.     Examination reveals tenderness to palpation of the thoracic and lumbar spine. Straight leg raise is negative bilaterally.      Imaging:   MRI thoracic and lumbar spine from 6/23/20 reviewed in clinic.     Impression:   1. Chronic compression deformity of the T7 vertebral body with mild vertebral body height loss and associated acute to subacute large intravertebral disc herniation/Schmorl's node in the superior  endplate.   2. Multilevel lumbar spondylosis most significant for a severe stenosis at the level of L4-L5.  3. Grade 1 anterolisthesis of L5 on S1.     Assessment/Plan:   Chronic T7 compression fracture  Back pain  Lumbar radiculopathy  Lumbar stenosis     71 year old female who presents for evaluation of back pain and leg pain. She had follow-up with the [facility] Neurosurgery clinic on 8/5/2020 for her T7 compression fracture. At that time, it was recommended patient continue with brace and follow-up in 6 weeks with thoracic spine xray. Patient did not have  "follow-up xray or visit with their clinic though. Today, she reports continued back pain and bilateral leg pain she describes as \"burning\". Denies any paresthesias or weakness. MRI thoracic spine and MRI lumbar spine from 6/23/20 reviewed in clinic. Reveals chronic compression fracture at T7, as well as severe stenosis at L4-5. Discussed options with patient. She would like to try a lumbar VIC at this time.     - Recommend CT thoracic spine to evaluate fracture. I will call with the results.   - Continue to wear brace as instructed. Will place referral to Orthotics for fitting/adjustment since patient reports brace is uncomfortable.   - Referral to pain management for L4-5 VIC  - Follow up in clinic if symptoms persist or worsen      services utilized for clinic visit. Advised patient to call our clinic with any questions or concerns. Discussed red flag symptoms and advised to seek medical attention if these develop. Patient voiced understanding and agreement.        Julia Calvin CNP  [facility] Neurosurgery  [address] [address]  Tel [phone]  Pager [phone]    "

## 2021-03-04 ENCOUNTER — APPOINTMENT (OUTPATIENT)
Dept: INTERPRETER SERVICES | Facility: CLINIC | Age: 71
End: 2021-03-04
Payer: COMMERCIAL

## 2021-03-04 ENCOUNTER — TELEPHONE (OUTPATIENT)
Dept: NEUROSURGERY | Facility: CLINIC | Age: 71
End: 2021-03-04

## 2021-03-04 NOTE — TELEPHONE ENCOUNTER
"Per Julia LINARES \" CT reviewed. Shows chronic T7 anterior wedge compression fracture, stable from comparison. She can wean out of brace. I also referred her for an injection for her low back and leg pain. Can we follow-up with her to see if she had this done or needs help scheduling?  She can follow-up in clinic if symptoms persist or worsen\"    Via language line, attempted to reach out to patient. Unable to leave message due to invalid phone number.     Left message for son Spencer who is listed as emergency contact to have pt called our clinic for update phone number and CT result.  "

## 2022-02-15 ENCOUNTER — APPOINTMENT (OUTPATIENT)
Dept: CT IMAGING | Facility: CLINIC | Age: 72
End: 2022-02-15
Attending: STUDENT IN AN ORGANIZED HEALTH CARE EDUCATION/TRAINING PROGRAM
Payer: COMMERCIAL

## 2022-02-15 ENCOUNTER — HOSPITAL ENCOUNTER (OUTPATIENT)
Facility: CLINIC | Age: 72
Setting detail: OBSERVATION
Discharge: HOME | End: 2022-02-21
Attending: EMERGENCY MEDICINE | Admitting: STUDENT IN AN ORGANIZED HEALTH CARE EDUCATION/TRAINING PROGRAM
Payer: COMMERCIAL

## 2022-02-15 ENCOUNTER — APPOINTMENT (OUTPATIENT)
Dept: MRI IMAGING | Facility: CLINIC | Age: 72
End: 2022-02-15
Attending: EMERGENCY MEDICINE
Payer: COMMERCIAL

## 2022-02-15 ENCOUNTER — APPOINTMENT (OUTPATIENT)
Dept: GENERAL RADIOLOGY | Facility: CLINIC | Age: 72
End: 2022-02-15
Attending: EMERGENCY MEDICINE
Payer: COMMERCIAL

## 2022-02-15 DIAGNOSIS — Z74.09 IMPAIRED MOBILITY AND ADLS: ICD-10-CM

## 2022-02-15 DIAGNOSIS — M54.41 BILATERAL LOW BACK PAIN WITH BILATERAL SCIATICA, UNSPECIFIED CHRONICITY: ICD-10-CM

## 2022-02-15 DIAGNOSIS — M48.56XA COLLAPSE OF LUMBAR VERTEBRA, INITIAL ENCOUNTER (H): ICD-10-CM

## 2022-02-15 DIAGNOSIS — M54.42 ACUTE BACK PAIN WITH SCIATICA, LEFT: ICD-10-CM

## 2022-02-15 DIAGNOSIS — S32.040A CLOSED COMPRESSION FRACTURE OF L4 LUMBAR VERTEBRA, INITIAL ENCOUNTER (H): ICD-10-CM

## 2022-02-15 DIAGNOSIS — Z78.9 IMPAIRED MOBILITY AND ADLS: ICD-10-CM

## 2022-02-15 DIAGNOSIS — M54.41 ACUTE BACK PAIN WITH SCIATICA, RIGHT: ICD-10-CM

## 2022-02-15 DIAGNOSIS — Z74.09 REDUCED MOBILITY: ICD-10-CM

## 2022-02-15 DIAGNOSIS — U07.1 INFECTION DUE TO 2019 NOVEL CORONAVIRUS: ICD-10-CM

## 2022-02-15 DIAGNOSIS — M54.42 BILATERAL LOW BACK PAIN WITH BILATERAL SCIATICA, UNSPECIFIED CHRONICITY: ICD-10-CM

## 2022-02-15 DIAGNOSIS — U07.1 LAB TEST POSITIVE FOR DETECTION OF COVID-19 VIRUS: ICD-10-CM

## 2022-02-15 LAB
ALBUMIN SERPL-MCNC: 3.6 G/DL (ref 3.4–5)
ALBUMIN UR-MCNC: NEGATIVE MG/DL
ALP SERPL-CCNC: 132 U/L (ref 40–150)
ALT SERPL W P-5'-P-CCNC: 21 U/L (ref 0–50)
ANION GAP SERPL CALCULATED.3IONS-SCNC: 4 MMOL/L (ref 3–14)
APPEARANCE UR: CLEAR
AST SERPL W P-5'-P-CCNC: 22 U/L (ref 0–45)
BASOPHILS # BLD AUTO: 0 10E3/UL (ref 0–0.2)
BASOPHILS NFR BLD AUTO: 0 %
BILIRUB SERPL-MCNC: 0.3 MG/DL (ref 0.2–1.3)
BILIRUB UR QL STRIP: NEGATIVE
BUN SERPL-MCNC: 11 MG/DL (ref 7–30)
CALCIUM SERPL-MCNC: 9.6 MG/DL (ref 8.5–10.1)
CHLORIDE BLD-SCNC: 106 MMOL/L (ref 94–109)
CO2 SERPL-SCNC: 29 MMOL/L (ref 20–32)
COLOR UR AUTO: NORMAL
CREAT SERPL-MCNC: 0.91 MG/DL (ref 0.52–1.04)
EOSINOPHIL # BLD AUTO: 0 10E3/UL (ref 0–0.7)
EOSINOPHIL NFR BLD AUTO: 0 %
ERYTHROCYTE [DISTWIDTH] IN BLOOD BY AUTOMATED COUNT: 14.4 % (ref 10–15)
GFR SERPL CREATININE-BSD FRML MDRD: 67 ML/MIN/1.73M2
GLUCOSE BLD-MCNC: 90 MG/DL (ref 70–99)
GLUCOSE UR STRIP-MCNC: NEGATIVE MG/DL
HCT VFR BLD AUTO: 35.9 % (ref 35–47)
HGB BLD-MCNC: 11.5 G/DL (ref 11.7–15.7)
HGB UR QL STRIP: NEGATIVE
IMM GRANULOCYTES # BLD: 0 10E3/UL
IMM GRANULOCYTES NFR BLD: 1 %
KETONES UR STRIP-MCNC: NEGATIVE MG/DL
LEUKOCYTE ESTERASE UR QL STRIP: NEGATIVE
LYMPHOCYTES # BLD AUTO: 1.2 10E3/UL (ref 0.8–5.3)
LYMPHOCYTES NFR BLD AUTO: 35 %
MCH RBC QN AUTO: 32 PG (ref 26.5–33)
MCHC RBC AUTO-ENTMCNC: 32 G/DL (ref 31.5–36.5)
MCV RBC AUTO: 100 FL (ref 78–100)
MONOCYTES # BLD AUTO: 0.3 10E3/UL (ref 0–1.3)
MONOCYTES NFR BLD AUTO: 10 %
NEUTROPHILS # BLD AUTO: 1.9 10E3/UL (ref 1.6–8.3)
NEUTROPHILS NFR BLD AUTO: 54 %
NITRATE UR QL: NEGATIVE
NRBC # BLD AUTO: 0 10E3/UL
NRBC BLD AUTO-RTO: 0 /100
PH UR STRIP: 6.5 [PH] (ref 5–7)
PLATELET # BLD AUTO: 155 10E3/UL (ref 150–450)
POTASSIUM BLD-SCNC: 4.1 MMOL/L (ref 3.4–5.3)
PROT SERPL-MCNC: 8.3 G/DL (ref 6.8–8.8)
RBC # BLD AUTO: 3.59 10E6/UL (ref 3.8–5.2)
RBC URINE: <1 /HPF
SARS-COV-2 RNA RESP QL NAA+PROBE: POSITIVE
SODIUM SERPL-SCNC: 139 MMOL/L (ref 133–144)
SP GR UR STRIP: 1 (ref 1–1.03)
SQUAMOUS EPITHELIAL: <1 /HPF
T4 FREE SERPL-MCNC: 1.21 NG/DL (ref 0.76–1.46)
TSH SERPL DL<=0.005 MIU/L-ACNC: 4.23 MU/L (ref 0.4–4)
UROBILINOGEN UR STRIP-MCNC: NORMAL MG/DL
WBC # BLD AUTO: 3.4 10E3/UL (ref 4–11)
WBC URINE: <1 /HPF

## 2022-02-15 PROCEDURE — 72148 MRI LUMBAR SPINE W/O DYE: CPT | Mod: 26 | Performed by: RADIOLOGY

## 2022-02-15 PROCEDURE — 99285 EMERGENCY DEPT VISIT HI MDM: CPT | Mod: 25 | Performed by: EMERGENCY MEDICINE

## 2022-02-15 PROCEDURE — G0378 HOSPITAL OBSERVATION PER HR: HCPCS

## 2022-02-15 PROCEDURE — 85025 COMPLETE CBC W/AUTO DIFF WBC: CPT | Performed by: EMERGENCY MEDICINE

## 2022-02-15 PROCEDURE — 72100 X-RAY EXAM L-S SPINE 2/3 VWS: CPT | Mod: 26 | Performed by: RADIOLOGY

## 2022-02-15 PROCEDURE — 81001 URINALYSIS AUTO W/SCOPE: CPT | Performed by: EMERGENCY MEDICINE

## 2022-02-15 PROCEDURE — 99219 PR INITIAL OBSERVATION CARE,LEVEL II: CPT | Mod: GC | Performed by: STUDENT IN AN ORGANIZED HEALTH CARE EDUCATION/TRAINING PROGRAM

## 2022-02-15 PROCEDURE — 84443 ASSAY THYROID STIM HORMONE: CPT | Performed by: EMERGENCY MEDICINE

## 2022-02-15 PROCEDURE — 80053 COMPREHEN METABOLIC PANEL: CPT | Performed by: EMERGENCY MEDICINE

## 2022-02-15 PROCEDURE — 72128 CT CHEST SPINE W/O DYE: CPT

## 2022-02-15 PROCEDURE — 36415 COLL VENOUS BLD VENIPUNCTURE: CPT | Performed by: EMERGENCY MEDICINE

## 2022-02-15 PROCEDURE — C9803 HOPD COVID-19 SPEC COLLECT: HCPCS | Performed by: EMERGENCY MEDICINE

## 2022-02-15 PROCEDURE — 72148 MRI LUMBAR SPINE W/O DYE: CPT

## 2022-02-15 PROCEDURE — 72131 CT LUMBAR SPINE W/O DYE: CPT

## 2022-02-15 PROCEDURE — U0005 INFEC AGEN DETEC AMPLI PROBE: HCPCS | Performed by: EMERGENCY MEDICINE

## 2022-02-15 PROCEDURE — 99207 PR CDG-MDM COMPONENT: MEETS MODERATE - DOWN CODED: CPT | Performed by: STUDENT IN AN ORGANIZED HEALTH CARE EDUCATION/TRAINING PROGRAM

## 2022-02-15 PROCEDURE — 72146 MRI CHEST SPINE W/O DYE: CPT

## 2022-02-15 PROCEDURE — 84439 ASSAY OF FREE THYROXINE: CPT | Performed by: EMERGENCY MEDICINE

## 2022-02-15 PROCEDURE — 250N000013 HC RX MED GY IP 250 OP 250 PS 637: Performed by: EMERGENCY MEDICINE

## 2022-02-15 PROCEDURE — 72128 CT CHEST SPINE W/O DYE: CPT | Mod: 26 | Performed by: RADIOLOGY

## 2022-02-15 PROCEDURE — 71045 X-RAY EXAM CHEST 1 VIEW: CPT | Mod: 26 | Performed by: RADIOLOGY

## 2022-02-15 PROCEDURE — 72100 X-RAY EXAM L-S SPINE 2/3 VWS: CPT

## 2022-02-15 PROCEDURE — 71045 X-RAY EXAM CHEST 1 VIEW: CPT

## 2022-02-15 PROCEDURE — 72131 CT LUMBAR SPINE W/O DYE: CPT | Mod: 26 | Performed by: RADIOLOGY

## 2022-02-15 PROCEDURE — 99285 EMERGENCY DEPT VISIT HI MDM: CPT | Performed by: EMERGENCY MEDICINE

## 2022-02-15 PROCEDURE — 72146 MRI CHEST SPINE W/O DYE: CPT | Mod: 26 | Performed by: RADIOLOGY

## 2022-02-15 PROCEDURE — 250N000013 HC RX MED GY IP 250 OP 250 PS 637: Performed by: STUDENT IN AN ORGANIZED HEALTH CARE EDUCATION/TRAINING PROGRAM

## 2022-02-15 RX ORDER — ASPIRIN 81 MG/1
81 TABLET ORAL DAILY
Status: DISCONTINUED | OUTPATIENT
Start: 2022-02-16 | End: 2022-02-21 | Stop reason: HOSPADM

## 2022-02-15 RX ORDER — THYROID 15 MG/1
15 TABLET ORAL DAILY
Status: DISCONTINUED | OUTPATIENT
Start: 2022-02-16 | End: 2022-02-21

## 2022-02-15 RX ORDER — ONDANSETRON 4 MG/1
4 TABLET, ORALLY DISINTEGRATING ORAL EVERY 6 HOURS PRN
Status: DISCONTINUED | OUTPATIENT
Start: 2022-02-15 | End: 2022-02-21 | Stop reason: HOSPADM

## 2022-02-15 RX ORDER — OXYCODONE HYDROCHLORIDE 5 MG/1
5 TABLET ORAL ONCE
Status: COMPLETED | OUTPATIENT
Start: 2022-02-15 | End: 2022-02-15

## 2022-02-15 RX ORDER — ONDANSETRON 2 MG/ML
4 INJECTION INTRAMUSCULAR; INTRAVENOUS EVERY 6 HOURS PRN
Status: DISCONTINUED | OUTPATIENT
Start: 2022-02-15 | End: 2022-02-21 | Stop reason: HOSPADM

## 2022-02-15 RX ORDER — LOSARTAN POTASSIUM 100 MG/1
100 TABLET ORAL DAILY
Status: DISCONTINUED | OUTPATIENT
Start: 2022-02-16 | End: 2022-02-21 | Stop reason: HOSPADM

## 2022-02-15 RX ORDER — GABAPENTIN 300 MG/1
300 CAPSULE ORAL 2 TIMES DAILY
Status: DISCONTINUED | OUTPATIENT
Start: 2022-02-15 | End: 2022-02-21 | Stop reason: HOSPADM

## 2022-02-15 RX ORDER — ACETAMINOPHEN 325 MG/1
650 TABLET ORAL ONCE
Status: COMPLETED | OUTPATIENT
Start: 2022-02-15 | End: 2022-02-15

## 2022-02-15 RX ORDER — ACETAMINOPHEN 325 MG/1
650 TABLET ORAL EVERY 6 HOURS PRN
Status: DISCONTINUED | OUTPATIENT
Start: 2022-02-15 | End: 2022-02-21 | Stop reason: HOSPADM

## 2022-02-15 RX ORDER — OXYCODONE HYDROCHLORIDE 5 MG/1
5 TABLET ORAL EVERY 6 HOURS PRN
Qty: 10 TABLET | Refills: 0 | Status: SHIPPED | OUTPATIENT
Start: 2022-02-15 | End: 2022-02-21

## 2022-02-15 RX ADMIN — OXYCODONE HYDROCHLORIDE 5 MG: 5 TABLET ORAL at 18:18

## 2022-02-15 RX ADMIN — ACETAMINOPHEN 650 MG: 325 TABLET, FILM COATED ORAL at 18:17

## 2022-02-15 RX ADMIN — OXYCODONE HYDROCHLORIDE 5 MG: 5 TABLET ORAL at 13:54

## 2022-02-15 RX ADMIN — OXYCODONE HYDROCHLORIDE 5 MG: 5 TABLET ORAL at 23:13

## 2022-02-15 RX ADMIN — GABAPENTIN 300 MG: 300 CAPSULE ORAL at 23:58

## 2022-02-15 ASSESSMENT — ACTIVITIES OF DAILY LIVING (ADL)
ADLS_ACUITY_SCORE: 8

## 2022-02-15 ASSESSMENT — ENCOUNTER SYMPTOMS
MYALGIAS: 1
NAUSEA: 0
BACK PAIN: 1
CHILLS: 0
FEVER: 0
WEAKNESS: 1
NUMBNESS: 1
ABDOMINAL PAIN: 0
SHORTNESS OF BREATH: 0
DYSURIA: 1
VOMITING: 0

## 2022-02-15 NOTE — ED TRIAGE NOTES
"Pt arrives via EMS for back pain and bilateral hip pain radiating down the legs. Pt states the pain has gotten worse the past 2 weeks that she is unable to stand. Pt states my \"bones hurt\"  "

## 2022-02-15 NOTE — ED NOTES
Bed: ED25  Expected date:   Expected time:   Means of arrival:   Comments:  HEMS 418  72 F  Leg weakness (bilat?)  YELLOW

## 2022-02-15 NOTE — ED PROVIDER NOTES
"    [facility] ([facility])  2/15/22  History     Chief Complaint   Patient presents with     Generalized Weakness     The history is provided by the patient and medical records.     Junaid Gunter is a 72 year old female with a past medical history significant for chronic hepatitis C, peripheral neuropathy, hypothyroidism, hypertension, thrombocytopenia, degenerative disc disease who presents to the Emergency Department for evaluation of back pain. Patient reports this particular back pain has been ongoing for the last 1 month. She reports this has been gradually worsening over the last two weeks. Patient reports this is a lower back pain, and states the pain radiates down into her legs. She reports this is a \"burning, tingling\" pain.  Patient reports the pain makes it feel as the \"bottoms of her feet are weak\". Patient typically uses a walker in her home, however, states that the pain has made her feel increasingly weak and unsteady on her feet. Patient has not had a recent fall or mechanical injury that precipitated her back pain. Patient denies difficulty with urination or incontinence. Patient denies saddle anesthesia. Patient does report a \"burning sensation\" when she does urinate. Patient has not had fevers or chills. Patient otherwise denies chest pain or shortness of breath. No abdominal pain, nausea, or vomiting.    Past Medical History  Past Medical History:   Diagnosis Date     DJD (degenerative joint disease)      HCV (hepatitis C virus)      Hypertension      Osteoporosis      No past surgical history on file.  ACETAMINOPHEN EXTRA STRENGTH 500 MG tablet  aspirin (ASA) 81 MG EC tablet  diclofenac (VOLTAREN) 1 % topical gel  ferrous gluconate (FERGON) 324 (38 Fe) MG tablet  gabapentin (NEURONTIN) 300 MG capsule  losartan (COZAAR) 100 MG tablet  SENNA-LAX 8.6 MG tablet  thyroid (ARMOUR) 15 MG tablet  tiZANidine (ZANAFLEX) 4 MG tablet  vitamin D3 (CHOLECALCIFEROL) 2000 units " (50 mcg) tablet      Allergies   Allergen Reactions     Egg [Chicken-Derived Products (Egg)] Itching     Family History  Family History   Problem Relation Age of Onset     Depression Sister      Social History   Social History     Tobacco Use     Smoking status: Never Smoker     Smokeless tobacco: Never Used   Substance Use Topics     Alcohol use: Never     Drug use: Never      Past medical history, past surgical history, medications, allergies, family history, and social history were reviewed with the patient. No additional pertinent items.     I have reviewed the Medications, Allergies, Past Medical and Surgical History, and Social History in the Epic system.    Review of Systems   Constitutional: Negative for chills and fever.   Respiratory: Negative for shortness of breath.    Cardiovascular: Negative for chest pain.   Gastrointestinal: Negative for abdominal pain, nausea and vomiting.   Genitourinary: Positive for dysuria.   Musculoskeletal: Positive for back pain (radiates down into legs), gait problem and myalgias.   Neurological: Positive for weakness (due to pain) and numbness.   All other systems reviewed and are negative.      Physical Exam   BP: (!) 183/82  Pulse: 77  Temp: 97.9  F (36.6  C)  Resp: 18  Weight: 77.6 kg (171 lb)  SpO2: 99 %      Physical Exam  Vitals and nursing note reviewed.   Constitutional:       General: She is not in acute distress.     Appearance: She is well-developed. She is obese. She is not ill-appearing or diaphoretic.   HENT:      Head: Normocephalic and atraumatic.      Nose: Nose normal.   Eyes:      General: No scleral icterus.     Conjunctiva/sclera: Conjunctivae normal.   Cardiovascular:      Rate and Rhythm: Normal rate.   Pulmonary:      Effort: Pulmonary effort is normal. No respiratory distress.   Abdominal:      General: Abdomen is protuberant. There is no distension.      Palpations: Abdomen is soft.      Tenderness: There is abdominal tenderness (Mild) in the  suprapubic area. There is no guarding or rebound.   Musculoskeletal:         General: No deformity or signs of injury. Normal range of motion.      Cervical back: Normal range of motion and neck supple. No rigidity.      Thoracic back: Bony tenderness present.      Lumbar back: Bony tenderness present.        Back:       Comments: Tender over midline lower thoracic and lumbar spine   Skin:     General: Skin is warm and dry.      Coloration: Skin is not jaundiced or pale.      Findings: No rash.   Neurological:      General: No focal deficit present.      Mental Status: She is alert and oriented to person, place, and time.      Motor: Weakness present.      Comments: Giveaway weakness in bilateral lower extremities.  Exam limited by lower back pain   Psychiatric:         Mood and Affect: Mood normal.         Behavior: Behavior normal.         ED Course     At 1:20 PM the patient was seen and examined by Dominique Benton MD in Room ED25.        Procedures                   Results for orders placed or performed during the hospital encounter of 02/15/22 (from the past 24 hour(s))   Comprehensive metabolic panel   Result Value Ref Range    Sodium 139 133 - 144 mmol/L    Potassium 4.1 3.4 - 5.3 mmol/L    Chloride 106 94 - 109 mmol/L    Carbon Dioxide (CO2) 29 20 - 32 mmol/L    Anion Gap 4 3 - 14 mmol/L    Urea Nitrogen 11 7 - 30 mg/dL    Creatinine 0.91 0.52 - 1.04 mg/dL    Calcium 9.6 8.5 - 10.1 mg/dL    Glucose 90 70 - 99 mg/dL    Alkaline Phosphatase 132 40 - 150 U/L    AST 22 0 - 45 U/L    ALT 21 0 - 50 U/L    Protein Total 8.3 6.8 - 8.8 g/dL    Albumin 3.6 3.4 - 5.0 g/dL    Bilirubin Total 0.3 0.2 - 1.3 mg/dL    GFR Estimate 67 >60 mL/min/1.73m2   CBC with platelets differential    Narrative    The following orders were created for panel order CBC with platelets differential.  Procedure                               Abnormality         Status                     ---------                               -----------          ------                     CBC with platelets and d...[[id number]]  Abnormal            Final result                 Please view results for these tests on the individual orders.   CBC with platelets and differential   Result Value Ref Range    WBC Count 3.4 (L) 4.0 - 11.0 10e3/uL    RBC Count 3.59 (L) 3.80 - 5.20 10e6/uL    Hemoglobin 11.5 (L) 11.7 - 15.7 g/dL    Hematocrit 35.9 35.0 - 47.0 %     78 - 100 fL    MCH 32.0 26.5 - 33.0 pg    MCHC 32.0 31.5 - 36.5 g/dL    RDW 14.4 10.0 - 15.0 %    Platelet Count 155 150 - 450 10e3/uL    % Neutrophils 54 %    % Lymphocytes 35 %    % Monocytes 10 %    % Eosinophils 0 %    % Basophils 0 %    % Immature Granulocytes 1 %    NRBCs per 100 WBC 0 <1 /100    Absolute Neutrophils 1.9 1.6 - 8.3 10e3/uL    Absolute Lymphocytes 1.2 0.8 - 5.3 10e3/uL    Absolute Monocytes 0.3 0.0 - 1.3 10e3/uL    Absolute Eosinophils 0.0 0.0 - 0.7 10e3/uL    Absolute Basophils 0.0 0.0 - 0.2 10e3/uL    Absolute Immature Granulocytes 0.0 <=0.4 10e3/uL    Absolute NRBCs 0.0 10e3/uL   TSH with free T4 reflex   Result Value Ref Range    TSH 4.23 (H) 0.40 - 4.00 mU/L   T4 free   Result Value Ref Range    Free T4 1.21 0.76 - 1.46 ng/dL   UA with Microscopic reflex to Culture    Specimen: Urine, Midstream   Result Value Ref Range    Color Urine Straw Colorless, Straw, Light Yellow, Yellow    Appearance Urine Clear Clear    Glucose Urine Negative Negative mg/dL    Bilirubin Urine Negative Negative    Ketones Urine Negative Negative mg/dL    Specific Gravity Urine 1.005 1.003 - 1.035    Blood Urine Negative Negative    pH Urine 6.5 5.0 - 7.0    Protein Albumin Urine Negative Negative mg/dL    Urobilinogen Urine Normal Normal, 2.0 mg/dL    Nitrite Urine Negative Negative    Leukocyte Esterase Urine Negative Negative    RBC Urine <1 <=2 /HPF    WBC Urine <1 <=5 /HPF    Squamous Epithelials Urine <1 <=1 /HPF    Narrative    Urine Culture not indicated   Asymptomatic COVID-19 Virus (Coronavirus) by PCR  Nasopharyngeal    Specimen: Nasopharyngeal; Swab   Result Value Ref Range    SARS CoV2 PCR Positive (A) Negative, Testing sent to reference lab. Results will be returned via unsolicited result    Narrative    Testing was performed using the Xpert Xpress SARS-CoV-2 Assay on the  Cepheid Gene-Xpert Instrument Systems. Additional information about  this Emergency Use Authorization (EUA) assay can be found via the Lab  Guide. This test should be ordered for the detection of SARS-CoV-2 in  individuals who meet SARS-CoV-2 clinical and/or epidemiological  criteria. Test performance is unknown in asymptomatic patients. This  test is for in vitro diagnostic use under the FDA EUA for  laboratories certified under CLIA to perform high complexity testing.  This test has not been FDA cleared or approved. A negative result  does not rule out the presence of PCR inhibitors in the specimen or  target RNA in concentration below the limit of detection for the  assay. The possibility of a false negative should be considered if  the patient's recent exposure or clinical presentation suggests  COVID-19. This test was validated by the [facility]. This laboratory is certified under  the Clinical Laboratory Improvement Amendments of 1988 (CLIA-88) as  qualified to perform high complexity laboratory testing.     XR Lumbar Spine 2/3 Views    Narrative    EXAM: XR LUMBAR SPINE 2-3 VIEWS  2/15/2022 3:30 PM      HISTORY: low back pain, sciatica    COMPARISON: MRI 6/23/2020    FINDINGS: Portable AP and crosstable lateral view of the lumbar spine.    Bony detail is significantly limited, especially on the lateral view.    5 lumbar type vertebral bodies.    Possible anterior wedging of L4 on L5, poorly detailed. Potential disc  space loss is obscured. Lower lumbar facet hypertrophy.    Vascular calcifications .      Impression    IMPRESSION: Limited bony detail on the lateral view.  Spondylosis  poorly detailed. Possible anterior wedging of L4 on L5.         NATHALIE GAMING MD (Joe)         SYSTEM ID:  [id number]   MR Thoracic Spine w/o Contrast    Impression    RESIDENT PRELIMINARY INTERPRETATION  Impression:  Thoracic spine: No acute abnormality or abnormal signal in the cord.    Lumbar spine:   1. L4 compression fracture with 80% loss of height in the central disc  and mild L5 compression deformity. A similar appearance can be seen  with metastatic disease, and short-term follow-up with  contrast-enhanced MRI should considered. Infection is also possible  given given abnormal signal in the disc space lack of definition of  the L4 inferior endplate, though absence of leukocytosis or abnormal  adjacent collection argues against this.   2. Slight worsening of severe L4-5 spinal canal narrowing secondary to  endplate spurring.   MR Lumbar Spine w/o Contrast    Impression    RESIDENT PRELIMINARY INTERPRETATION  Impression:  Thoracic spine: No acute abnormality or abnormal signal in the cord.    Lumbar spine:   1. L4 compression fracture with 80% loss of height in the central disc  and mild L5 compression deformity. A similar appearance can be seen  with metastatic disease, and short-term follow-up with  contrast-enhanced MRI should considered. Infection is also possible  given given abnormal signal in the disc space lack of definition of  the L4 inferior endplate, though absence of leukocytosis or abnormal  adjacent collection argues against this.   2. Slight worsening of severe L4-5 spinal canal narrowing secondary to  endplate spurring.     Medications   oxyCODONE (ROXICODONE) tablet 5 mg (5 mg Oral Given 2/15/22 1354)   acetaminophen (TYLENOL) tablet 650 mg (650 mg Oral Given 2/15/22 1817)   oxyCODONE (ROXICODONE) tablet 5 mg (5 mg Oral Given 2/15/22 1818)             Assessments & Plan (with Medical Decision Making)   Junaid Gunter is a 72 year old female with a past medical history  significant for chronic hepatitis C, peripheral neuropathy, hypothyroidism, hypertension, thrombocytopenia, degenerative disc disease who presents to the Emergency Department for evaluation of back pain.    Ddx: UTI, progressive Hep C, peripheral neuropathy, sciatica, lumbar/thoracic spinal stenosis, gait instability, inability to perform ADLs    Patient with weakness and gait instability in the setting of known peripheral neuropathy and chronic T7 anterior wedge compression fracture.  No recent trauma or falls.  Patient having increased difficulty ambulating and performing ADLs.  She lives alone.  Family member reports that they are unable to help her adequately with her current level of disability.  Will ultimately admit for PT/OT/potential placement.    Per review of patient's chart her last imaging of the spine is from March 2021 when she had a CT of thoracic spine that confirmed an old T7 anterior wedge compression deformity.  Also diffuse osteopenia.  In June 2020 patient had an MRI of the thoracic and lumbar spine.  This showed grade 1 anterolisthesis of L5 on S1 and multilevel lumbar spondylosis with severe stenosis at the level of L4-L5.  Given patient's known pathology without recent imaging and symptomatic progression we will repeat MRI of the thoracic and lumbar spine.  Patient has not had any symptoms of cauda equina but she may have progressive spinal stenosis leading to decreased lower extremity strength and gait instability.  Given oxycodone for pain.    Labs notable for normal urinalysis.  Normal T4.  CBC with a leukopenia of 3.4.  Normal absolute count.  Hemoglobin 11.5 which is increased from patient's baseline.  Normal platelets.  CMP normal.  X-ray of the lumbar spine was limited with possible anterior wedging of L4 on L5.  Incidentally, patient's COVID-19 test resulted positive.  She has had no respiratory symptoms or fever.  Unclear if this has contributed to patient's presenting symptoms of  weakness.  Patient will no longer be able to go to ED opts unit for management of her gait instability and difficulty with ADLs.  Patient will be admitted to medicine for ongoing management and potential placement in a extended care facility if necessary.  She did complete MR imaging of the thoracic and lumbar spine.  We will also add on a chest x-ray.  Pending the results of the MRI patient may be appropriate for transport to [facility] for medical admission.      6:53 PM Patient decided she no longer wants to be admitted. They were able to recruit a family member to stay with her at her home to help her with mobility. They did agree to stay to wait for results of MRI.    MRI resulted with L4 compression fracture with 80% height loss and mild L5 compression deformity. MRI with contrast recommended in short-term follow up to eval possible metastatic disease.  Patient updated on results of the MRI and willing to stay for neurosurgery evaluation.  Neurosurgery consulted for recommendations.  Patient signed out to oncoming provider at shift change with disposition pending neurosurgery recommendations.  She was prepared for discharge with neurosurgery clinic follow-up with a referral.  I also provided her with home oximetry and get well loop referral for her COVID-19 status.  Since she is not requiring oxygen and is essentially asymptomatic I did not think any further management was necessary.  Of note, the patient's family members and the patient's family member who is willing to stay with her are aware of her COVID-19 status.    I have reviewed the nursing notes.    I have reviewed the findings, diagnosis, plan and need for follow up with the patient.    New Prescriptions    No medications on file       Final diagnoses:   Impaired mobility and ADLs   Bilateral low back pain with bilateral sciatica, unspecified chronicity   Infection due to 2019 novel coronavirus   Closed compression fracture of L4 lumbar vertebra,  initial encounter (H)       I, Frankie Harris am serving as a trained medical scribe to document services personally performed by Dominique Benton MD, based on the provider's statements to me.      I, Dominique Benton MD, was physically present and have reviewed and verified the accuracy of this note documented by Frankie Harris.     Dominique Benton MD  2/15/2022   [facility] EMERGENCY DEPARTMENT     Dominique Benton MD  02/15/22 3185       Dominique Benton MD  02/15/22 0275

## 2022-02-16 ENCOUNTER — DOCUMENTATION ONLY (OUTPATIENT)
Dept: ORTHOPEDICS | Facility: CLINIC | Age: 72
End: 2022-02-16

## 2022-02-16 ENCOUNTER — PATIENT OUTREACH (OUTPATIENT)
Dept: CARE COORDINATION | Facility: CLINIC | Age: 72
End: 2022-02-16

## 2022-02-16 ENCOUNTER — APPOINTMENT (OUTPATIENT)
Dept: MRI IMAGING | Facility: CLINIC | Age: 72
End: 2022-02-16
Attending: STUDENT IN AN ORGANIZED HEALTH CARE EDUCATION/TRAINING PROGRAM
Payer: COMMERCIAL

## 2022-02-16 DIAGNOSIS — M46.26 OSTEOMYELITIS OF LUMBAR SPINE (H): Primary | ICD-10-CM

## 2022-02-16 LAB
ANION GAP SERPL CALCULATED.3IONS-SCNC: 5 MMOL/L (ref 3–14)
BUN SERPL-MCNC: 16 MG/DL (ref 7–30)
CALCIUM SERPL-MCNC: 9.3 MG/DL (ref 8.5–10.1)
CHLORIDE BLD-SCNC: 102 MMOL/L (ref 94–109)
CO2 SERPL-SCNC: 27 MMOL/L (ref 20–32)
CREAT SERPL-MCNC: 1.07 MG/DL (ref 0.52–1.04)
CRP SERPL-MCNC: 10 MG/L (ref 0–8)
D DIMER PPP FEU-MCNC: 1.04 UG/ML FEU (ref 0–0.5)
ERYTHROCYTE [DISTWIDTH] IN BLOOD BY AUTOMATED COUNT: 14.5 % (ref 10–15)
ERYTHROCYTE [SEDIMENTATION RATE] IN BLOOD BY WESTERGREN METHOD: 48 MM/HR (ref 0–30)
GFR SERPL CREATININE-BSD FRML MDRD: 55 ML/MIN/1.73M2
GLUCOSE BLD-MCNC: 108 MG/DL (ref 70–99)
HCT VFR BLD AUTO: 34.7 % (ref 35–47)
HGB BLD-MCNC: 11.1 G/DL (ref 11.7–15.7)
MCH RBC QN AUTO: 32.3 PG (ref 26.5–33)
MCHC RBC AUTO-ENTMCNC: 32 G/DL (ref 31.5–36.5)
MCV RBC AUTO: 101 FL (ref 78–100)
PLATELET # BLD AUTO: 151 10E3/UL (ref 150–450)
POTASSIUM BLD-SCNC: 4.8 MMOL/L (ref 3.4–5.3)
RBC # BLD AUTO: 3.44 10E6/UL (ref 3.8–5.2)
SODIUM SERPL-SCNC: 134 MMOL/L (ref 133–144)
WBC # BLD AUTO: 4.5 10E3/UL (ref 4–11)

## 2022-02-16 PROCEDURE — 250N000013 HC RX MED GY IP 250 OP 250 PS 637

## 2022-02-16 PROCEDURE — 250N000011 HC RX IP 250 OP 636

## 2022-02-16 PROCEDURE — 999N000128 HC STATISTIC PERIPHERAL IV START W/O US GUIDANCE

## 2022-02-16 PROCEDURE — A9585 GADOBUTROL INJECTION: HCPCS | Performed by: STUDENT IN AN ORGANIZED HEALTH CARE EDUCATION/TRAINING PROGRAM

## 2022-02-16 PROCEDURE — G0378 HOSPITAL OBSERVATION PER HR: HCPCS

## 2022-02-16 PROCEDURE — 85027 COMPLETE CBC AUTOMATED: CPT | Performed by: STUDENT IN AN ORGANIZED HEALTH CARE EDUCATION/TRAINING PROGRAM

## 2022-02-16 PROCEDURE — 250N000013 HC RX MED GY IP 250 OP 250 PS 637: Performed by: STUDENT IN AN ORGANIZED HEALTH CARE EDUCATION/TRAINING PROGRAM

## 2022-02-16 PROCEDURE — 72149 MRI LUMBAR SPINE W/DYE: CPT

## 2022-02-16 PROCEDURE — 99225 PR SUBSEQUENT OBSERVATION CARE,LEVEL II: CPT | Performed by: NURSE PRACTITIONER

## 2022-02-16 PROCEDURE — 99000 SPECIMEN HANDLING OFFICE-LAB: CPT | Performed by: PATHOLOGY

## 2022-02-16 PROCEDURE — 258N000003 HC RX IP 258 OP 636: Performed by: STUDENT IN AN ORGANIZED HEALTH CARE EDUCATION/TRAINING PROGRAM

## 2022-02-16 PROCEDURE — 86140 C-REACTIVE PROTEIN: CPT | Mod: 90 | Performed by: PATHOLOGY

## 2022-02-16 PROCEDURE — 72149 MRI LUMBAR SPINE W/DYE: CPT | Mod: 26 | Performed by: RADIOLOGY

## 2022-02-16 PROCEDURE — 255N000002 HC RX 255 OP 636: Performed by: STUDENT IN AN ORGANIZED HEALTH CARE EDUCATION/TRAINING PROGRAM

## 2022-02-16 PROCEDURE — 36415 COLL VENOUS BLD VENIPUNCTURE: CPT | Performed by: STUDENT IN AN ORGANIZED HEALTH CARE EDUCATION/TRAINING PROGRAM

## 2022-02-16 PROCEDURE — 99207 PR CDG-CODE CATEGORY CHANGED: CPT | Performed by: INTERNAL MEDICINE

## 2022-02-16 PROCEDURE — 85379 FIBRIN DEGRADATION QUANT: CPT | Performed by: STUDENT IN AN ORGANIZED HEALTH CARE EDUCATION/TRAINING PROGRAM

## 2022-02-16 PROCEDURE — 82310 ASSAY OF CALCIUM: CPT | Performed by: STUDENT IN AN ORGANIZED HEALTH CARE EDUCATION/TRAINING PROGRAM

## 2022-02-16 PROCEDURE — L0457 TLSO FLEX TRNK SJ-SS PRE OTS: HCPCS

## 2022-02-16 PROCEDURE — 85652 RBC SED RATE AUTOMATED: CPT | Mod: 90 | Performed by: PATHOLOGY

## 2022-02-16 PROCEDURE — 99225 PR SUBSEQUENT OBSERVATION CARE,LEVEL II: CPT | Mod: GC | Performed by: INTERNAL MEDICINE

## 2022-02-16 PROCEDURE — 96372 THER/PROPH/DIAG INJ SC/IM: CPT

## 2022-02-16 RX ORDER — GADOBUTROL 604.72 MG/ML
7.5 INJECTION INTRAVENOUS ONCE
Status: COMPLETED | OUTPATIENT
Start: 2022-02-16 | End: 2022-02-16

## 2022-02-16 RX ORDER — HEPARIN SODIUM 5000 [USP'U]/.5ML
5000 INJECTION, SOLUTION INTRAVENOUS; SUBCUTANEOUS EVERY 12 HOURS
Status: DISCONTINUED | OUTPATIENT
Start: 2022-02-16 | End: 2022-02-21 | Stop reason: HOSPADM

## 2022-02-16 RX ORDER — NALOXONE HYDROCHLORIDE 0.4 MG/ML
0.2 INJECTION, SOLUTION INTRAMUSCULAR; INTRAVENOUS; SUBCUTANEOUS
Status: DISCONTINUED | OUTPATIENT
Start: 2022-02-16 | End: 2022-02-21 | Stop reason: HOSPADM

## 2022-02-16 RX ORDER — OXYCODONE HYDROCHLORIDE 5 MG/1
5 TABLET ORAL ONCE
Status: COMPLETED | OUTPATIENT
Start: 2022-02-16 | End: 2022-02-16

## 2022-02-16 RX ORDER — NALOXONE HYDROCHLORIDE 0.4 MG/ML
0.4 INJECTION, SOLUTION INTRAMUSCULAR; INTRAVENOUS; SUBCUTANEOUS
Status: DISCONTINUED | OUTPATIENT
Start: 2022-02-16 | End: 2022-02-21 | Stop reason: HOSPADM

## 2022-02-16 RX ADMIN — HEPARIN SODIUM 5000 UNITS: 5000 INJECTION, SOLUTION INTRAVENOUS; SUBCUTANEOUS at 17:42

## 2022-02-16 RX ADMIN — Medication 1 MG: at 01:21

## 2022-02-16 RX ADMIN — THYROID, PORCINE 15 MG: 15 TABLET ORAL at 09:44

## 2022-02-16 RX ADMIN — SODIUM CHLORIDE, POTASSIUM CHLORIDE, SODIUM LACTATE AND CALCIUM CHLORIDE 1000 ML: 600; 310; 30; 20 INJECTION, SOLUTION INTRAVENOUS at 11:00

## 2022-02-16 RX ADMIN — GADOBUTROL 7.5 ML: 604.72 INJECTION INTRAVENOUS at 00:15

## 2022-02-16 RX ADMIN — OXYCODONE HYDROCHLORIDE 5 MG: 5 TABLET ORAL at 16:57

## 2022-02-16 RX ADMIN — LOSARTAN POTASSIUM 100 MG: 100 TABLET, FILM COATED ORAL at 09:44

## 2022-02-16 RX ADMIN — GABAPENTIN 300 MG: 300 CAPSULE ORAL at 09:45

## 2022-02-16 RX ADMIN — OXYCODONE HYDROCHLORIDE 2.5 MG: 5 TABLET ORAL at 21:23

## 2022-02-16 RX ADMIN — OXYCODONE HYDROCHLORIDE 2.5 MG: 5 TABLET ORAL at 01:22

## 2022-02-16 RX ADMIN — OXYCODONE HYDROCHLORIDE 2.5 MG: 5 TABLET ORAL at 09:47

## 2022-02-16 RX ADMIN — HEPARIN SODIUM 5000 UNITS: 5000 INJECTION, SOLUTION INTRAVENOUS; SUBCUTANEOUS at 07:14

## 2022-02-16 RX ADMIN — ASPIRIN 81 MG: 81 TABLET, COATED ORAL at 09:45

## 2022-02-16 RX ADMIN — OXYCODONE HYDROCHLORIDE 2.5 MG: 5 TABLET ORAL at 13:36

## 2022-02-16 RX ADMIN — GABAPENTIN 300 MG: 300 CAPSULE ORAL at 21:22

## 2022-02-16 NOTE — PROGRESS NOTES
"[facility], [place]   Neurosurgery Progress Note:    Assessment: Junaid Gunter is a 72 year old female admitted with low back pain and radiculopathy with newly diagnosed L4 endplate compression fracture.  MRI lumbar spine completed without evidence of metastatic disease.        Clinically Significant Risk Factors Present on Admission              # Platelet Defect: home medication list includes an antiplatelet medication     Thrombocytopenia       Plan:  - Recommend TLSO brace when out of bed  - Please obtained Lumbar AP/Lateral upright x-rays with brace  - No neurosurgical intervention warranted at this time, patient may follow-up in Neurosurgery clinic as outpatient if symptoms persist to discuss potential surgical intervention         PATITO Diaz, CNP  2/16/2022  Department of Neurosurgery  Pager: [phone]      Interval History:  Patient interviewed via  this morning.  Patient states low back pain is currently controlled with pain medication.  States that her whole body feels \"weak.\"            Objective:   Temp:  [96.2  F (35.7  C)-98.2  F (36.8  C)] 96.8  F (36  C)  Pulse:  [70-96] 72  Resp:  [16-18] 16  BP: (108-183)/(52-88) 144/57  SpO2:  [92 %-100 %] 92 %  I/O last 3 completed shifts:  In: -   Out: 400 [Urine:400]    Gen: Appears comfortable, NAD  Neurologic:  - Alert & Oriented to person, place, time, and situation  - Follows commands briskly  - Speech fluent, spontaneous. No aphasia or dysarthria.  - No gaze preference. No apparent hemineglect.  - PERRL, EOMI  - Strong eye closure, jaw clench, and cheek puff  - Face symmetric with sensation intact to light touch  - Palate elevates symmetrically, uvula midline, tongue protrudes midline  - Trapezii and sternocleidomastoid muscles 5/5 bilaterally  - No pronator drift     Del Tr Bi WE WF Gr   R 5 5 5 5 5 5   L 5 5 5 5 5 5    HF KE KF DF PF EHL   R 5 5 5 5 5 5   L 5 5 5 5 5 5     Reflexes 2+ " throughout    Sensation intact and symmetric to light touch throughout    LABS  Recent Labs   Lab Test 02/16/22  0734 02/15/22  1353 06/24/20  0622   WBC 4.5 3.4* 4.2   HGB 11.1* 11.5* 9.0*   * 100 102*    155 113*       Recent Labs   Lab Test 02/16/22  0734 02/15/22  1353 06/24/20  0622    139 137   POTASSIUM 4.8 4.1 4.6   CHLORIDE 102 106 106   CO2 27 29 27   BUN 16 11 17   CR 1.07* 0.91 1.03   ANIONGAP 5 4 4   TACHO 9.3 9.6 8.1*   * 90 88       IMAGING    Recent Results (from the past 24 hour(s))   XR Lumbar Spine 2/3 Views    Narrative    EXAM: XR LUMBAR SPINE 2-3 VIEWS  2/15/2022 3:30 PM      HISTORY: low back pain, sciatica    COMPARISON: MRI 6/23/2020    FINDINGS: Portable AP and crosstable lateral view of the lumbar spine.    Bony detail is significantly limited, especially on the lateral view.    5 lumbar type vertebral bodies.    Possible anterior wedging of L4 on L5, poorly detailed. Potential disc  space loss is obscured. Lower lumbar facet hypertrophy.    Vascular calcifications .      Impression    IMPRESSION: Limited bony detail on the lateral view. Spondylosis  poorly detailed. Possible anterior wedging of L4 on L5.         NATHALIE GAMING MD (Joe)         SYSTEM ID:  [id number]   MR Thoracic Spine w/o Contrast    Narrative    Thoracic and Lumbar spine MRI without contrast 2/15/2022    History: Mid-back pain; Mid-back pain, compression fracture suspected;  Mid-back pain, neuro deficit.    Comparison: CT 3/1/2021 and MRI 6/23/2020    Technique:  Axial T2-weighted, and sagittal T1, STIR, and T2-weighted  images of the lumbar spine without intravenous contrast.     Sagittal T1, STIR, and T2-weighted images of the thoracic spine  without contrast were obtained, with axial T2-weighted images through  levels of interest in the thoracic spine.     Findings:  Thoracic Spine: Exaggerated thoracic kyphosis with unchanged moderate  T7 compression deformity with superior endplate  Schmorl node and  approximately 50% height loss in the central vertebral body. No  definite abnormal signal in the thoracic spinal cord at any level.  Spinal canal and neural foramen are patent.  Alignment of the thoracic vertebra appears within normal limits. Bone  marrow signal intensity on noncontrast images appears unremarkable.  Overall no change since 6/23/2020.    Lumbar Spine:  There are 5 type lumbar vertebra, used for the purposes of this  dictation.  The tip of the conus is at L2. Grade 1 anterolisthesis of  L5 on S1, unchanged since prior MRI. Lumbar spine alignment is  otherwise preserved. Regarding the bone marrow, there is abnormal T1  hypointensity surrounding the L4-5 disc space with STIR hyperintensity  in a similar distribution. There is abnormal widening of the L4-5 disc  space and mild narrowing of the L5-S1 disc space. Since 6/23/2020, new  large Schmorl's node like indentation along inferior endplate of L4.  .  On a level by level basis, the findings are as follows:    L1-2: Mild disc bulge. Bilateral ligamentum flavum hypertrophy.  Minimal small spinal canal narrowing. Neural foramina are patent.    L2-3: Mild bilateral ligamentum flavum hypertrophy. Mild disc bulge.  Mild canal narrowing. Neural foramina are grossly patent.     L3-4: Right foraminal disc protrusion. Bilateral facet hypertrophic  changes. Bilateral ligamentum flavum hypertrophy. Right lateral recess  narrowing. Mild to moderate canal narrowing and narrowing of the right  lateral recess with likely impingement of the right L4 nerve root.  Left neural foramen is patent. Mild right neural foraminal stenosis.     L4-5: Significant disc osteophyte complex and significant bilateral  ligament flavum hypertrophy and facet hypertrophy. There is severe  canal stenosis. There is moderate left, moderate to severe right  foraminal stenoses. There is right foraminal disc protrusion. The  findings appear slightly progressed since 6/23/2020.  There is fluid  signal along the left facet joint. The fluid signal also extends  inferiorly from the facet joint posterior to the left lamina. There  are inflammatory changes around right facet joint as well. Abnormal  widened appearance of the disc space with loss of definition of the L4  inferior endplate. There is approximately 80% height loss in the  central L4 vertebral body.     L5-S1: Mild anterolisthesis. Mild to moderate right neural foraminal  narrowing. Spinal canal and left neural foramen are patent.    No abnormal paraspinous collection.   The visualized paraspinous tissues anteriorly are unremarkable.      Impression    Impression:  Thoracic spine: No acute abnormality or abnormal signal in the cord.    Lumbar spine:   1. New L4 inferior endplate compression fracture with 80% loss of  height in the central disc and mild L5 superior endplate compression  deformity. Degenerative signal changes along inferior L4 and superior  L5 endplates overall findings are felt to be due to facet degenerative  changes. If there is any clinical concern for discitis osteomyelitis  or underlying malignant process, contrast enhanced imaging can be  obtained.   2. Persistent and worsened now severe canal stenosis at L4-L5 due to  facet arthropathy changes and disc osteophyte complex.     I have personally reviewed the examination and initial interpretation  and I agree with the findings.    HERIBERTO OROZCO MD         SYSTEM ID:  [id number]   MR Lumbar Spine w/o Contrast    Narrative    Thoracic and Lumbar spine MRI without contrast 2/15/2022    History: Mid-back pain; Mid-back pain, compression fracture suspected;  Mid-back pain, neuro deficit.    Comparison: CT 3/1/2021 and MRI 6/23/2020    Technique:  Axial T2-weighted, and sagittal T1, STIR, and T2-weighted  images of the lumbar spine without intravenous contrast.     Sagittal T1, STIR, and T2-weighted images of the thoracic spine  without contrast were obtained, with axial  T2-weighted images through  levels of interest in the thoracic spine.     Findings:  Thoracic Spine: Exaggerated thoracic kyphosis with unchanged moderate  T7 compression deformity with superior endplate Schmorl node and  approximately 50% height loss in the central vertebral body. No  definite abnormal signal in the thoracic spinal cord at any level.  Spinal canal and neural foramen are patent.  Alignment of the thoracic vertebra appears within normal limits. Bone  marrow signal intensity on noncontrast images appears unremarkable.  Overall no change since 6/23/2020.    Lumbar Spine:  There are 5 type lumbar vertebra, used for the purposes of this  dictation.  The tip of the conus is at L2. Grade 1 anterolisthesis of  L5 on S1, unchanged since prior MRI. Lumbar spine alignment is  otherwise preserved. Regarding the bone marrow, there is abnormal T1  hypointensity surrounding the L4-5 disc space with STIR hyperintensity  in a similar distribution. There is abnormal widening of the L4-5 disc  space and mild narrowing of the L5-S1 disc space. Since 6/23/2020, new  large Schmorl's node like indentation along inferior endplate of L4.  .  On a level by level basis, the findings are as follows:    L1-2: Mild disc bulge. Bilateral ligamentum flavum hypertrophy.  Minimal small spinal canal narrowing. Neural foramina are patent.    L2-3: Mild bilateral ligamentum flavum hypertrophy. Mild disc bulge.  Mild canal narrowing. Neural foramina are grossly patent.     L3-4: Right foraminal disc protrusion. Bilateral facet hypertrophic  changes. Bilateral ligamentum flavum hypertrophy. Right lateral recess  narrowing. Mild to moderate canal narrowing and narrowing of the right  lateral recess with likely impingement of the right L4 nerve root.  Left neural foramen is patent. Mild right neural foraminal stenosis.     L4-5: Significant disc osteophyte complex and significant bilateral  ligament flavum hypertrophy and facet  hypertrophy. There is severe  canal stenosis. There is moderate left, moderate to severe right  foraminal stenoses. There is right foraminal disc protrusion. The  findings appear slightly progressed since 6/23/2020. There is fluid  signal along the left facet joint. The fluid signal also extends  inferiorly from the facet joint posterior to the left lamina. There  are inflammatory changes around right facet joint as well. Abnormal  widened appearance of the disc space with loss of definition of the L4  inferior endplate. There is approximately 80% height loss in the  central L4 vertebral body.     L5-S1: Mild anterolisthesis. Mild to moderate right neural foraminal  narrowing. Spinal canal and left neural foramen are patent.    No abnormal paraspinous collection.   The visualized paraspinous tissues anteriorly are unremarkable.      Impression    Impression:  Thoracic spine: No acute abnormality or abnormal signal in the cord.    Lumbar spine:   1. New L4 inferior endplate compression fracture with 80% loss of  height in the central disc and mild L5 superior endplate compression  deformity. Degenerative signal changes along inferior L4 and superior  L5 endplates overall findings are felt to be due to facet degenerative  changes. If there is any clinical concern for discitis osteomyelitis  or underlying malignant process, contrast enhanced imaging can be  obtained.   2. Persistent and worsened now severe canal stenosis at L4-L5 due to  facet arthropathy changes and disc osteophyte complex.     I have personally reviewed the examination and initial interpretation  and I agree with the findings.    HERIBERTO OROZCO MD         SYSTEM ID:  [id number]   XR Chest Port 1 View    Narrative    EXAM: XR CHEST 1 VW 2/15/2022      HISTORY: covid.    COMPARISON: CT 6/20/2020.     TECHNIQUE: Frontal view of the chest.    FINDINGS: Hyperinflated lungs with mixed hazy/streaky opacities in the  lung bases. Remaining lungs are relatively  clear without focal  consolidation. Cardiomediastinal silhouette is within normal limits.  The pulmonary vasculature is distinct. No acute abnormality of the  bony thorax and visualized upper abdomen. Unchanged convex right  thoracic scoliosis.      Impression    IMPRESSION: Mixed streaky opacities in the lung bases, likely  atelectasis. Remaining lungs are clear.    I have personally reviewed the examination and initial interpretation  and I agree with the findings.    LYLY ZELAYA MD         SYSTEM ID:  [id number]   CT Thoracic Spine w/o Contrast    Narrative    EXAM: CT THORACIC SPINE W/O CONTRAST  LOCATION: [facility]  DATE/TIME: 2/15/2022 11:57 PM    INDICATION: Back pain.  COMPARISON: MRI thoracic spine 06/22/2020  TECHNIQUE: Routine CT Thoracic Spine without IV contrast. Multiplanar reformats. Dose reduction techniques were used.     FINDINGS:  VERTEBRA:   Diffuse bony demineralization.     T1 vertebral body within normal limits.    T2 and T3 vertebral body mild chronic compression deformities similar to prior exam.    T4, T5, T6 vertebral body mild to moderate chronic compression deformities similar to prior exam.    T7 severe chronic compression deformity similar to prior exam.    T8 and T9 mild to moderate chronic compression deformities similar to prior exam.    T10, T11, and T12 mild chronic compression deformities similar to prior examination.    No acute fracture. No acute post traumatic subluxation.    CANAL/FORAMINA: Mild exaggerated thoracic kyphosis. T6-T7 mild grade 1 anterolisthesis measuring 1 to 2 mm. No additional subluxations. Multilevel spondylosis result in various levels and degrees of central canal stenosis and neural foraminal stenosis.   No critical central canal stenosis. Please defer evaluation of the spondylosis to the MRI thoracic spine 02/15/2022.    PARASPINAL: Vascular calcifications. Mild hiatal hernia. Midesophagus fluid suggesting  reflux and/or dysmotility.      Impression    IMPRESSION:  1.  Diffuse bony demineralization.   2.  Thoracic spine demonstrates multiple chronic compression deformities most severe at T7 described above.  3.  Multilevel spondylosis described above.     Lumbar spine CT w/o contrast    Addendum: 2/16/2022    L5-S1 grade 1 anterolisthesis measuring 4 to 5 mm secondary to spondylosis redemonstrated from   MRI lumbar spine 02/15/2022.     L4-L5 high-grade severe central canal stenosis redemonstrated from MRI lumbar spine 02/15/2022.        Narrative    EXAM: CT LUMBAR SPINE W/O CONTRAST  LOCATION: [facility]  DATE/TIME: 2/15/2022 11:57 PM    INDICATION: Back pain  COMPARISON: MRI lumbar spine 06/22/2020, MRI lumbar spine 02/15/2022  TECHNIQUE: Routine CT Lumbar Spine without IV contrast. Multiplanar reformats. Dose reduction techniques were used.     FINDINGS:  VERTEBRA:   Diffuse bony demineralization.     L4 inferior endplate large central concavity resulting in severe central vertebral body compression deformity. Findings redemonstrated from recent prior examination. There is mild bone marrow edema adjacent to the compression deformity on MRI 02/15/2022,   which could reflect chronic fracture with degenerative type I Modic changes or acute/subacute fracture.      L5 vertebral body mild to moderate compression deformity. The superior endplate and mid vertebral body demonstrates bone marrow edema, which could reflect chronic fracture with pronounced degenerative type I Modic changes or acute to subacute fracture.    Remaining lumbar vertebral body heights are within normal limits. Otherwise, no acute fracture.    CANAL/FORAMINA: Multilevel spondylosis result in various levels and degrees of central canal stenosis and neural foraminal stenosis. L4-L5 high-grade severe canal stenosis. Please defer characterization of the spondylosis and the effect on the thecal sac   and  neural foramina to the MRI lumbar spine 02/15/2022.    PARASPINAL: Left kidney midpole small cyst. No specific follow-up recommended. Vascular calcifications.      Impression    IMPRESSION:  1.  L4 inferior endplate large central concavity resulting in severe central vertebral body compression deformity. Findings redemonstrated from recent prior examination. There is mild bone marrow edema adjacent to the compression deformity on MRI   02/15/2022, which could reflect chronic fracture with degenerative type I Modic changes or acute/subacute fracture.      2.  L5 vertebral body mild to moderate compression deformity. The superior endplate and mid vertebral body demonstrates bone marrow edema, which could reflect chronic fracture with pronounced degenerative type I Modic changes or acute to subacute   fracture.    3.  Otherwise, no acute fracture.    4.  Diffuse bony demineralization.    5.  Multilevel spondylosis.    6.  L4-L5 severe high-grade severe central canal stenosis.     MR Lumbar Spine w Contrast    Narrative    EXAM: MR LUMBAR SPINE W CONTRAST  LOCATION: [facility]  DATE/TIME: 2/16/2022 12:14 AM    INDICATION: Back pain  COMPARISON: None.  CONTRAST: 7.5mL Gadavist  TECHNIQUE: Routine Lumbar Spine MRI with IV contrast.    FINDINGS:   Nomenclature is based on 5 lumbar type vertebral bodies.     L4 inferior endplate large central concavity resulting in severe central vertebral body compression deformity. The inferior endplate demonstrates mild enhancement corresponding to mild increased STIR signal on MRI lumbar spine 02/15/2022 likely due to   mild bone marrow edema. Findings may reflect chronic fracture with degenerative type I Modic changes or acute/subacute fracture.      L5 vertebral body mild to moderate compression deformity. The superior endplate and mid vertebral body demonstrates pronounced enhancement corresponding to increased STIR signal on MRI lumbar  spine 02/15/2022 likely due to pronounced bone marrow edema.   Findings may reflect chronic fracture with pronounced degenerative type I Modic changes or acute to subacute fracture.    Remaining lumbar vertebral body heights are within normal limits.     Multilevel spondylosis result in various levels and degrees of central canal stenosis and neural foraminal stenosis. L4-L5 high-grade severe canal stenosis. Please defer characterization of the spondylosis and the effect on the thecal sac and neural   foramina to the MRI lumbar spine 02/15/2022. L4-L5 grade 1 anterolisthesis.    No intradural pathologic enhancement. Anterior epidural space demonstrates engorged venous plexus throughout. No concerning enhancement within the spinal canal.    L4-L5 interspinous bursitis with enhancement        Impression    IMPRESSION:  1.  L4 inferior endplate large central concavity resulting in severe central vertebral body compression deformity. The inferior endplate demonstrates mild enhancement corresponding to mild increased STIR signal on MRI lumbar spine 02/15/2022 likely due   to mild bone marrow edema. Findings may reflect chronic fracture with degenerative type I Modic changes or acute/subacute fracture.      2.  L5 vertebral body mild to moderate compression deformity. The superior endplate and mid vertebral body demonstrates pronounced enhancement corresponding to increased STIR signal on MRI lumbar spine 02/15/2022 likely due to pronounced bone marrow   edema. Findings may reflect chronic fracture with pronounced degenerative type I Modic changes or acute to subacute fracture.    3.  L4-L5 discitis osteomyelitis less unlikely given no significant increased T2/STIR signal (fluid signal) within the L4-L5 disc space on noncontrast MRI lumbar spine 02/15/2022. Morphology makes metastatic disease unlikely.     4.  Multilevel spondylosis. L5-S1 grade 1 anterolisthesis.    5.  L4-L5 severe high-grade thecal sac stenosis  redemonstrated from noncontrast MR lumbar spine 02/15/2022.    6.  L4-L5 interspinous bursitis.     7.  No concerning enhancement within the spinal canal.

## 2022-02-16 NOTE — PROGRESS NOTES
Clinic Care Coordination Contact    Referral Type: Virtual Home Monitoring - [employer] Program    Patient referred for Virtual Home Monitoring Program for COVID-19 following recent [facility] ED visit on 2/15 with provider: Dominique Benton MD.      Criteria for Virtual Home Monitoring telephone outreach is not met after review of ED encounter/ED provider note because:    1) Patient was not discharged home; rather, patient was admitted to hospital and remains inpatient on 2/16/22 for dx of new L4 endplate compression fracture with radiculopathy.      In addition to reason above, provider notes state COVID-19 diagnosis was an incidental finding and patient is asymptomatic. Home Monitoring program referral is not appropriate in this case.    Plan: Referral canceled due to reasons above.     Meera Pereira RN  [facility]  - RN Care Coordinator

## 2022-02-16 NOTE — PLAN OF CARE
"Shift: 1900 - 0730  RE For Admission:  Impaired mobility and ADLs, Bilateral low back pain with bilateral sciatica, Closed compression fracture of L4, COVID19 pos  PMH: Chronic hepatitis C, peripheral neuropathy, osteoporosis, hypothyroidism, hypertension, thrombocytopenia, degenerative disc disease  Code Status: Full Code  Activity: Pt stayed in bed  Pain: Pt stated no pain due to receiving pain medication in the ER  Neuro: A&O x4. Able to let needs be known verbally.  Cardiac: VSS. Temp low at 0400 (96.2)   Respiratory: Oxygen sat 96-97 on RA. No wheezing or SOB  GI/: Pt continent of urine. Requires assistance x1 to ambulate to restroom.   Diet/appetite: Regular diet  LDA's: R  PIV-SL.   Incisions/Drains/Skin: Skin assessment done by OZZY Grimaldo and OZZY Conde. No skin issues noted.   Prophylaxis: Heparin   Labs: None  MRI: Lumbar Spine w contrast: L4 inferior endplate large central concavity resulting in severe central vertebral body compression deformity.  New Changes This Shift: Pt arrived to unit at 0155 with all personal belongings.  Per ER report, pt arrived to ER with c/o pain in back with difficulty ambulating. She states that for a long time now she has been having lower extremity pain then progressed to \"whole body\" then began to worsen the past several months. Tonight she began having weakness in her legs and was falling down so this prompted her to come into the ED. Last week she was walking with a walker but this week she has been unable to walk. She endorses shooting pains down her legs. Leg pain particularly when she stands up and walks. The pain improved when she sits down but doesn't fully go away unless she takes a pain meds. The bottom of her feet feel numb. She has home health aid about one hour a day but otherwise lives alone. Patient was reportedly electing for discharge home AMA from ER, but later decided to get admitted to inpatient. Pt is on isolation precautions for COVID19. " KIMBERLY can can MAEW. Abd soft non distended and non tender. Pt speaks [place]itanian and uses  for communication. Pt oriented to staff, room, and call light. Pt stated understanding. Pt educated on using call light for assistance if needed to use restroom and stated understanding. Pt stated no pain due to receiving pain medication in the ER. Per , pt stated that she did not have any needs, she just wants to be left alone so she could get some sleep. Bed in low position, wheels locked, call light in reach.  Plan: Patient was evaluated by the neurosurgery team.  There is concern that her canal stenosis is worsening causing her lower extremity weakness.  This could progress to paralysis.  They strongly recommend TLSO brace placement.    Shahnaz Grimaldo RN, BSN-BC 7B Med/Surg  [facility]

## 2022-02-16 NOTE — DISCHARGE INSTRUCTIONS
Your MRI shows a fracture of your lumbar spine with severe stenosis (narrowing) of the spinal canal which is likely causing the weakness in your lower extremities.  This is a very severe issue.  It could lead to paralysis of the lower extremities.  Your eval by the neurosurgery team.  They strongly recommended that you be admitted to the hospital for further imaging (CT thoracolumbar spine, MRI lumbar spine with contrast) as well as a fitting for a TLSO brace.  Unfortunately, you have elected to leave AGAINST MEDICAL ADVICE.  Please understand that this injury could lead to paralysis of your lower extremities and worsening low back pain.  It is extremely important that you follow-up with the neurosurgery team, see contact information listed below.  Return to the ED with any new or worsening symptoms or if you change your mind about admission.      Please make an appointment to follow up with Neurosurgery Clinic (phone: [phone]) as soon as possible for management of your back pain and weakness.    You were given a referral to Neurosurgery clinic. Someone should call you to set up this appointment, but please call the number listed above, if you do not hear from someone within 1-2 business days.     Take 600 mg ibuprofen every 8 hours, for pain and inflammation.  Take this on schedule, for approximately 1 week or until resolution of symptoms.    Take this medication with food to prevent stomach upset.  If you taking a chronic antacid medication, make sure to take this while you are taking this medication.    Rest, avoid repetitive motion, and lifting over 5 pounds with the effected extremity.  May use ice or heating pad to help with the pain.    Return to the emergency department if you develop worsening pain, weakness, numbness or tingling, bowel or bladder changes.

## 2022-02-16 NOTE — ED NOTES
Patient received in signout from Dr. Benton.  Please see her note for full documentation.  In short, patient presented to the ED with generalized weakness, particularly lower extremities for the past month.  Incidentally, was found to have COVID.  MRI showed L4 compression fracture with canal stenosis.  Plan is for observation admission, unfortunately, patient was reportedly electing for discharge home.    Plan on signout is to follow-up with neurosurgery recommendations and disposition accordingly.    ED Course as of 02/15/22 2325   Tue Feb 15, 2022   2109 Case discussed with ED staff neurosurgery team.  They recommend admission to the medicine op service.  Patient will need MRI of the lumbosacral spine with contrast, CT of the thoracolumbar spine, as well as a TLSO brace.  They will continue to see patient in consult.       XR Chest Port 1 View   Preliminary Result   RESIDENT PRELIMINARY INTERPRETATION   IMPRESSION: Mixed streaky opacities in the lung bases, likely   atelectasis. Remaining lungs are clear.      MR Lumbar Spine w/o Contrast   Final Result   Impression:   Thoracic spine: No acute abnormality or abnormal signal in the cord.      Lumbar spine:    1. New L4 inferior endplate compression fracture with 80% loss of   height in the central disc and mild L5 superior endplate compression   deformity. Degenerative signal changes along inferior L4 and superior   L5 endplates overall findings are felt to be due to facet degenerative   changes. If there is any clinical concern for discitis osteomyelitis   or underlying malignant process, contrast enhanced imaging can be   obtained.    2. Persistent and worsened now severe canal stenosis at L4-L5 due to   facet arthropathy changes and disc osteophyte complex.       I have personally reviewed the examination and initial interpretation   and I agree with the findings.      HERIBERTO OROZCO MD            SYSTEM ID:  [id number]      MR Thoracic Spine w/o Contrast   Final  Result   Impression:   Thoracic spine: No acute abnormality or abnormal signal in the cord.      Lumbar spine:    1. New L4 inferior endplate compression fracture with 80% loss of   height in the central disc and mild L5 superior endplate compression   deformity. Degenerative signal changes along inferior L4 and superior   L5 endplates overall findings are felt to be due to facet degenerative   changes. If there is any clinical concern for discitis osteomyelitis   or underlying malignant process, contrast enhanced imaging can be   obtained.    2. Persistent and worsened now severe canal stenosis at L4-L5 due to   facet arthropathy changes and disc osteophyte complex.       I have personally reviewed the examination and initial interpretation   and I agree with the findings.      HERIBERTO OROZCO MD            SYSTEM ID:  [id number]      XR Lumbar Spine 2/3 Views   Final Result   IMPRESSION: Limited bony detail on the lateral view. Spondylosis   poorly detailed. Possible anterior wedging of L4 on L5.             NATHALIE GAMING MD (Joe)            SYSTEM ID:  [id number]      MR Lumbar Spine w Contrast    (Results Pending)   CT Thoracic Spine w/o Contrast    (Results Pending)   Lumbar spine CT w/o contrast    (Results Pending)     Patient was evaluated by the neurosurgery team.  There is concern that her canal stenosis is worsening causing her lower extremity weakness.  This could progressed to paralysis.  They strongly recommend admission to the hospital for TLSO brace placement, CT scan of the thoracolumbar spine, MRI of the lumbar spine with contrast.  They will continue to follow along in consult.    I discussed all the findings and plan of care with the patient and her family member via .  Unfortunately, the patient is adamant that she be discharged AGAINST MEDICAL ADVICE.  We discussed at length the seriousness of this issue and the potential risks of discharge including death, worsening back pain/leg  weakness, paralysis.  They expressed verbal understanding of the risks and are adamant about the plan for discharge.  I have no ability / indication to hold patient against her will.      Patient and family member are aware that this is suboptimal care.  They will follow-up in the neurosurgery clinic as soon as possible.  Agreed to return to the ED if they change her mind about admission or with any new or worsening symptoms.  I provided a short course of pain medication.  Patient previously enrolled in the get well loop per previous provider.    Update: Patient and family are now comfortable with her staying and agree to admission to the hospital.  Case discussed with the medicine service.  Neurosurgery updated as well.  Relevant imaging ordered per neurosurgery recommendations.       Ozzy Katz, DO  02/15/22 2201       Ozzy Katz, DO  02/15/22 2251       Ozzy Katz, DO  02/15/22 0591

## 2022-02-16 NOTE — UTILIZATION REVIEW
"    Concurrent stay review; Secondary Review Determination     [facility]          Under the authority of the Utilization Management Committee, the utilization review process indicated a secondary review on the above patient.  The review outcome is based on review of the medical records, discussions with staff, and applying clinical experience noted on the date of the review.          (x) Observation Status Appropriate - Concurrent stay review    RATIONALE FOR DETERMINATION     \"Junaid Gunter is a 73 YO [place] Zealander-speaking F with a h/o chronic hepatitis C, peripheral neuropathy, osteoporosis, hypothyroidism, hypertension, chronic thrombocytopenia and degenerative disc disease who was admitted on 2/15/2022 with poor ambulation, LR compression fracture, and incidentally found to be COVID-19 positive.\"    The patient has been evaluated by neurosurgery and TLSO is recommended when out of bed. The pain is being managed with gabapentin and oxycodone. Her COVID 19 status is asymptomatic. For now continued observation status is appropriate.    The severity of illness, intensity of service provided, expected LOS and risk for adverse outcome make the care appropriate for further observation.        The information on this document is developed by the utilization review team in order for the business office to ensure compliance.  This only denotes the appropriateness of proper admission status and does not reflect the quality of care rendered.         The definitions of Inpatient Status and Observation Status used in making the determination above are those provided in the CMS Coverage Manual, Chapter 1 and Chapter 6, section 70.4.      Sincerely,     ELIZABETH BEST MD    Physician Advisor  Utilization Management  [facility].        "

## 2022-02-16 NOTE — CONSULTS
[facility]       NEUROSURGERY CONSULTATION      This consultation was requested by ER service      Reason for Consultation: low back pain        HPI: Junaid Gunter is a 72 year old female who was seen for low back pain and pain radiating to her both legs. She has a past medical history significant for chronic hepatitis C, peripheral neuropathy, hypothyroidism, hypertension, thrombocytopenia, degenerative disc disease. She complains of sharp shooting pain radiating from back to the posterior aspect of her legs to her toes. She also complained of burning pain in her feet and some associated numbness in her feet. She gives a history of back and leg pain consistent with neurogenic claudication with cramping leg pain on walking which subsided on resting and bending forward. Slowly the claudication distance began to diminish leaving her with difficulty in ambulating even short distances. She does not compalin of any weakness in her legs. She also denies any bowel bladder involvement. She has no symptom suggestive of saddle anesthesia. She denies any history of trauma or fall. She has been evaluated in the past by Dr Woods, neurosurgery for T7 fracture and been managed conservatively.         PAST MEDICAL HISTORY:   Past Medical History:   Diagnosis Date     DJD (degenerative joint disease)      HCV (hepatitis C virus)      Hypertension      Osteoporosis        PAST SURGICAL HISTORY: No past surgical history on file.    FAMILY HISTORY:   Family History   Problem Relation Age of Onset     Depression Sister        SOCIAL HISTORY:   Social History     Tobacco Use     Smoking status: Never Smoker     Smokeless tobacco: Never Used   Substance Use Topics     Alcohol use: Never       MEDICATIONS:  (Not in a hospital admission)      Allergies:  Allergies   Allergen Reactions     Egg [Chicken-Derived Products (Egg)] Itching       ROS: 10 point ROS of systems including  Constitutional, Eyes, Respiratory, Cardiovascular, Gastroenterology, Genitourinary, Integumentary, Muscularskeletal, Psychiatric were all negative except for pertinent positives noted in my HPI.    Physical exam:   Blood pressure (!) 164/67, pulse 85, temperature 98.2  F (36.8  C), temperature source Oral, resp. rate 17, weight 77.6 kg (171 lb), SpO2 98 %.  CV: RRR, no murmurs, rubs, or gallops  PULM: breathing comfortably on room air  ABD: soft, non-distended,     NEUROLOGIC:  -- Awake; Alert; oriented x 3  -- Follows commands briskly  -- -Speech fluent, spontaneous.   -- no gaze preference. No apparent hemineglect.  Cranial Nerves:  -- PERRL 3-2mm bilat and brisk, extraocular movements intact  -- face symmetrical, tongue midline  -- sensory V1-V3 intact bilaterally  -- hearing grossly intact bilat  -- Trapezii 5/5 strength bilat symmetric      Motor:  Straight leg raising test -positive bilaterally     Delt Bi Tri Hand Flexion/  Extension Iliopsoas Quadriceps Hamstrings Tibialis Anterior Gastroc    C5 C6 C7 C8/T1 L2 L3 L4-S1 L4 S1   R 5 5 5 5 4 5 5 5 5   L 5 5 5 5 4 5 5 5 5     Bilateral EHL-weak      Sensory:  decreased sensation in the sole of feet bilaterally L4-S1 distribution  Reflexes:     Bi Tri BR Zeinab Pat Ach Bab    C5-6 C7-8 C6 UMN L2-4 S1 UMN   R 2+ 2+ 2+ Norm 2+ 2+ Norm   L 2+ 2+ 2+ Norm 2+ 2+ Norm       LABS:  Last Comprehensive Metabolic Panel:  Sodium   Date Value Ref Range Status   02/15/2022 139 133 - 144 mmol/L Final   06/24/2020 137 133 - 144 mmol/L Final     Potassium   Date Value Ref Range Status   02/15/2022 4.1 3.4 - 5.3 mmol/L Final   06/24/2020 4.6 3.4 - 5.3 mmol/L Final     Chloride   Date Value Ref Range Status   02/15/2022 106 94 - 109 mmol/L Final   06/24/2020 106 94 - 109 mmol/L Final     Carbon Dioxide   Date Value Ref Range Status   06/24/2020 27 20 - 32 mmol/L Final     Carbon Dioxide (CO2)   Date Value Ref Range Status   02/15/2022 29 20 - 32 mmol/L Final     Anion Gap   Date  Value Ref Range Status   02/15/2022 4 3 - 14 mmol/L Final   06/24/2020 4 3 - 14 mmol/L Final     Glucose   Date Value Ref Range Status   02/15/2022 90 70 - 99 mg/dL Final   06/24/2020 88 70 - 99 mg/dL Final     Urea Nitrogen   Date Value Ref Range Status   02/15/2022 11 7 - 30 mg/dL Final   06/24/2020 17 7 - 30 mg/dL Final     Creatinine   Date Value Ref Range Status   02/15/2022 0.91 0.52 - 1.04 mg/dL Final   06/24/2020 1.03 0.52 - 1.04 mg/dL Final     GFR Estimate   Date Value Ref Range Status   02/15/2022 67 >60 mL/min/1.73m2 Final     Comment:     Effective December 21, 2021 eGFRcr in adults is calculated using the 2021 CKD-EPI creatinine equation which includes age and gender (Rashmi baer al., NEJ, DOI: 10.1056/PLFYxp1246384)   06/24/2020 55 (L) >60 mL/min/[1.73_m2] Final     Comment:     Non  GFR Calc  Starting 12/18/2018, serum creatinine based estimated GFR (eGFR) will be   calculated using the Chronic Kidney Disease Epidemiology Collaboration   (CKD-EPI) equation.       Calcium   Date Value Ref Range Status   02/15/2022 9.6 8.5 - 10.1 mg/dL Final   06/24/2020 8.1 (L) 8.5 - 10.1 mg/dL Final     Lab Results   Component Value Date    WBC 3.4 02/15/2022    WBC 4.2 06/24/2020     Lab Results   Component Value Date    RBC 3.59 02/15/2022    RBC 2.80 06/24/2020     Lab Results   Component Value Date    HGB 11.5 02/15/2022    HGB 9.0 06/24/2020     Lab Results   Component Value Date    HCT 35.9 02/15/2022    HCT 28.5 06/24/2020     Lab Results   Component Value Date     02/15/2022     06/24/2020     Lab Results   Component Value Date    MCH 32.0 02/15/2022    MCH 32.1 06/24/2020     Lab Results   Component Value Date    MCHC 32.0 02/15/2022    MCHC 31.6 06/24/2020     Lab Results   Component Value Date    RDW 14.4 02/15/2022    RDW 14.1 06/24/2020     Lab Results   Component Value Date     02/15/2022     06/24/2020       IMAGING: Lumbar spine:   1. New L4 inferior endplate  compression fracture with 80% loss of  height in the central disc and mild L5 superior endplate compression  deformity. Degenerative signal changes along inferior L4 and superior  L5 endplates overall findings are felt to be due to facet degenerative  changes. If there is any clinical concern for discitis osteomyelitis  or underlying malignant process, contrast enhanced imaging can be  obtained.   2. Persistent and worsened now severe canal stenosis at L4-L5 due to  facet arthropathy changes and disc osteophyte complex.           ASSESSMENT: Junaid Gunter is a 72 year old female who was seen for low back pain and pain radiating to her both legs. She has a past medical history significant for chronic hepatitis C, peripheral neuropathy, hypothyroidism, hypertension, thrombocytopenia, degenerative disc disease.                    # Platelet Defect: home medication list includes an antiplatelet medication             RECOMMENDATIONS:  No neurosurgical intervention indicated at this time   MRI LS spine with contrast  CT Thoracolumbar spine  TLSO brace  Admit to ER Obs and PT/OT evaluation  The patient was discussed with Dr. Matos neurosurgery chief resident,she agrees with the above.    Fox HERNANDEZ    Neurosurgery

## 2022-02-16 NOTE — PROGRESS NOTES
S: Pt seen at [facility] room 7219 bedside and recognized me from the last time she was fit with the same brace. O: I see the EPIC order for the TLSO due to L-4 FX. I hear she recognizes the brace from last time she was fit with one for her t-7 Fx. I see she is COVID +. A: Since she was familiar with the style of TLSO I fit her with it again. I also left the pictorial instruction sheet in her room for her to view. She seemed satisfied. I also explained to her PT that she had the brace and the style of brace it is and that the instruction sheet is in the room. She felt confident she could work with it. P: FU PRN. G: The goal is to reduce vertical load on the lumbar spine and to restrict motion.  Electronically signed Jason Sutherland CPO, LPO.

## 2022-02-16 NOTE — H&P
[facility]    History and Physical - Hospitalist Service, GOLD TEAM        Date of Admission:  2/15/2022    Assessment & Plan      Junaid Gunter is a 72 year old female with PMHx chronic hepatitis C, peripheral neuropathy, osteoporosis, hypothyroidism, hypertension, thrombocytopenia, degenerative disc disease, who is admitted on 2/15/2022 due to lumbar radiculopathy and difficulty ambulating, found to have a new L4 endplate compression fracture.     L4 endplate compression fracture with radiculopathy  Chronic T7 compression fracture  History of chronic T7 compression fracture and lumbar radiculopathy since 2020, but pt on arrival with inability to ambulate x1 week and reporting worsened back and radicular pain. MRI w/o contrast showed a new L4 inferior endplate compression fracture with persistent and worsened severe canal stenosis L4-L5. Prior Hx T7 compression fracture 6/2020, last seen by neurosurgery outpatient 3/2021 at which time imaging demonstrated stable appearance of chronic T7 fracture with multilevel lumbar spondylosis and severe stenosis of L4-L5, pt weaned from brace.  - Neurosurgery evaluated in the ED:  --- MRI LS spine with contrast ordered  --- CT Thoracolumbar spine  --- TLSO brace  - PT, OT  - Pain control  --- Tylenol PRN  --- Oxycodone 2.5 mg q4h PRN for breakthrough pain  --- PTA gabapentin 300 mg BID    # Osteoporosis  Previously on alendronate in 6/2020, unclear when discontinued.  - Check calcium, vitamin D  - Consider outpatient endocrine follow-up regarding further bisphosphonate therapy    # Incidentally COVID positive  # Hx thrombocytopenia  On room air currently, stable.  - DVT ppx: heparin subQ     # HTN - PTA losartan 100 mg daily  # Hypothryoidism - PTA dessicated thyroid tablet 15 mg daily  # Chronic hepatitis C - Started treatment in the past but was not completed d/t insurance issues, recommend outpatient follow-up.       Diet:  "Regular Diet Adult  DVT Prophylaxis: Heparin subQ  Tong Catheter: Not present  Central Lines: None  Cardiac Monitoring: None  Code Status: Full Code     Staffed with Attending physician Dr. Mya Brown.    Lena Elias MD  Hospitalist Service, [facility]  Securely message with the Vocera Web Console (learn more here)  Text page via marshallindex Paging/Directory   Please see signed in provider for up to date coverage information      ______________________________________________________________________    Chief Complaint   Back pain    History is obtained from the patient and her granddaughter    History of Present Illness   Junaid Gunter is a 72 year old female with a past medical history significant for chronic hepatitis C, peripheral neuropathy, hypothyroidism, hypertension, thrombocytopenia, degenerative disc disease, who is presenting due to back pain with difficulty ambulating. She states that for a long time now she has been having lower extremity pain then progressed to \"whole body\" then began to worsen the past several months. Tonight she began having weakness in her legs and was falling down so this prompted her to come into the ED. Last week she was walking with a walker but this week she has been unable to walk. She endorses shooting pains down her legs. Leg pain particularly when she stands up and walks. The pain improved when she sits down but doesn't fully go away unless she takes a pain meds. The bottom of her feet feel numb. She denies loss of bowel or bladder control. She is having some pain in LUE but no numbness. She has home health aid about one hour a day but otherwise lives alone. She is incidentally covid positive. She denies shortness of breath or cough.    ED course  -MR of L spine with new L4 endplate compression fracture, severe L4-5 canal stenosis  -discussed with neurosurgery in ED-> no acute intervent, get further imaging, TLSO " brace  -covid positive    Review of Systems    The 10 point Review of Systems is negative other than noted in the HPI or here.     Past Medical History    I have reviewed this patient's medical history and updated it with pertinent information if needed.   Past Medical History:   Diagnosis Date     DJD (degenerative joint disease)      HCV (hepatitis C virus)      Hypertension      Osteoporosis        Past Surgical History   I have reviewed this patient's surgical history and updated it with pertinent information if needed.  No past surgical history on file.    Social History   I have reviewed this patient's social history and updated it with pertinent information if needed.  Social History     Tobacco Use     Smoking status: Never Smoker     Smokeless tobacco: Never Used   Substance Use Topics     Alcohol use: Never     Drug use: Never       Family History   I have reviewed this patient's family history and updated it with pertinent information if needed.  Family History   Problem Relation Age of Onset     Depression Sister        Prior to Admission Medications   Prior to Admission Medications   Prescriptions Last Dose Informant Patient Reported? Taking?   ACETAMINOPHEN EXTRA STRENGTH 500 MG tablet   Yes No   SENNA-LAX 8.6 MG tablet   Yes No   aspirin (ASA) 81 MG EC tablet   No No   Sig: Take 1 tablet (81 mg) by mouth daily   diclofenac (VOLTAREN) 1 % topical gel   Yes No   ferrous gluconate (FERGON) 324 (38 Fe) MG tablet   Yes No   gabapentin (NEURONTIN) 300 MG capsule   No No   Sig: Take 1 capsule (300 mg) by mouth 2 times daily   losartan (COZAAR) 100 MG tablet   Yes No   thyroid (ARMOUR) 15 MG tablet   No No   Sig: Take 1 tablet (15 mg) by mouth daily   tiZANidine (ZANAFLEX) 4 MG tablet   No No   Sig: Take 1 tablet (4 mg) by mouth 2 times daily as needed for muscle spasms   vitamin D3 (CHOLECALCIFEROL) 2000 units (50 mcg) tablet   Yes No      Facility-Administered Medications: None     Allergies   Allergies    Allergen Reactions     Egg [Chicken-Derived Products (Egg)] Itching       Physical Exam   Vital Signs: Temp: 97.6  F (36.4  C) Temp src: Oral BP: 108/86 Pulse: 74   Resp: 17 SpO2: 98 % O2 Device: None (Room air)    Weight: 171 lbs 0 oz    General Appearance: NAD, appears mildly uncomfortable, resting in bed with legs bent  HEENT: EOMI, PERRL, conjunctiva clear  Respiratory: CTAB, no wheezing or crackles, no increased WOB  Cardiovascular: RRR, no m/r/g, extremities warm and well perfused  GI: soft, nontender, nondistended  Back: Tenderness in the mildline thoracic and L-spine, diffuse and non focal with no obvious bony deformity or stepoff felt, mild surrounding paraspinal tenderness  Musculoskeletal: no edema, moving all extremities  Neurologic: A&Ox3, alert, conversant, strength 5/5 in the BLE, sensation intact    Data   Data reviewed today: I reviewed all medications, new labs and imaging results over the last 24 hours.    Recent Labs   Lab 02/15/22  1353   WBC 3.4*   HGB 11.5*            POTASSIUM 4.1   CHLORIDE 106   CO2 29   BUN 11   CR 0.91   ANIONGAP 4   TACHO 9.6   GLC 90   ALBUMIN 3.6   PROTTOTAL 8.3   BILITOTAL 0.3   ALKPHOS 132   ALT 21   AST 22     Recent Results (from the past 24 hour(s))   XR Lumbar Spine 2/3 Views    Narrative    EXAM: XR LUMBAR SPINE 2-3 VIEWS  2/15/2022 3:30 PM      HISTORY: low back pain, sciatica    COMPARISON: MRI 6/23/2020    FINDINGS: Portable AP and crosstable lateral view of the lumbar spine.    Bony detail is significantly limited, especially on the lateral view.    5 lumbar type vertebral bodies.    Possible anterior wedging of L4 on L5, poorly detailed. Potential disc  space loss is obscured. Lower lumbar facet hypertrophy.    Vascular calcifications .      Impression    IMPRESSION: Limited bony detail on the lateral view. Spondylosis  poorly detailed. Possible anterior wedging of L4 on L5.         NATHALIE GAMING MD (Joe)         SYSTEM ID:   [id number]   MR Thoracic Spine w/o Contrast    Narrative    Thoracic and Lumbar spine MRI without contrast 2/15/2022    History: Mid-back pain; Mid-back pain, compression fracture suspected;  Mid-back pain, neuro deficit.    Comparison: CT 3/1/2021 and MRI 6/23/2020    Technique:  Axial T2-weighted, and sagittal T1, STIR, and T2-weighted  images of the lumbar spine without intravenous contrast.     Sagittal T1, STIR, and T2-weighted images of the thoracic spine  without contrast were obtained, with axial T2-weighted images through  levels of interest in the thoracic spine.     Findings:  Thoracic Spine: Exaggerated thoracic kyphosis with unchanged moderate  T7 compression deformity with superior endplate Schmorl node and  approximately 50% height loss in the central vertebral body. No  definite abnormal signal in the thoracic spinal cord at any level.  Spinal canal and neural foramen are patent.  Alignment of the thoracic vertebra appears within normal limits. Bone  marrow signal intensity on noncontrast images appears unremarkable.  Overall no change since 6/23/2020.    Lumbar Spine:  There are 5 type lumbar vertebra, used for the purposes of this  dictation.  The tip of the conus is at L2. Grade 1 anterolisthesis of  L5 on S1, unchanged since prior MRI. Lumbar spine alignment is  otherwise preserved. Regarding the bone marrow, there is abnormal T1  hypointensity surrounding the L4-5 disc space with STIR hyperintensity  in a similar distribution. There is abnormal widening of the L4-5 disc  space and mild narrowing of the L5-S1 disc space. Since 6/23/2020, new  large Schmorl's node like indentation along inferior endplate of L4.  .  On a level by level basis, the findings are as follows:    L1-2: Mild disc bulge. Bilateral ligamentum flavum hypertrophy.  Minimal small spinal canal narrowing. Neural foramina are patent.    L2-3: Mild bilateral ligamentum flavum hypertrophy. Mild disc bulge.  Mild canal narrowing.  Neural foramina are grossly patent.     L3-4: Right foraminal disc protrusion. Bilateral facet hypertrophic  changes. Bilateral ligamentum flavum hypertrophy. Right lateral recess  narrowing. Mild to moderate canal narrowing and narrowing of the right  lateral recess with likely impingement of the right L4 nerve root.  Left neural foramen is patent. Mild right neural foraminal stenosis.     L4-5: Significant disc osteophyte complex and significant bilateral  ligament flavum hypertrophy and facet hypertrophy. There is severe  canal stenosis. There is moderate left, moderate to severe right  foraminal stenoses. There is right foraminal disc protrusion. The  findings appear slightly progressed since 6/23/2020. There is fluid  signal along the left facet joint. The fluid signal also extends  inferiorly from the facet joint posterior to the left lamina. There  are inflammatory changes around right facet joint as well. Abnormal  widened appearance of the disc space with loss of definition of the L4  inferior endplate. There is approximately 80% height loss in the  central L4 vertebral body.     L5-S1: Mild anterolisthesis. Mild to moderate right neural foraminal  narrowing. Spinal canal and left neural foramen are patent.    No abnormal paraspinous collection.   The visualized paraspinous tissues anteriorly are unremarkable.      Impression    Impression:  Thoracic spine: No acute abnormality or abnormal signal in the cord.    Lumbar spine:   1. New L4 inferior endplate compression fracture with 80% loss of  height in the central disc and mild L5 superior endplate compression  deformity. Degenerative signal changes along inferior L4 and superior  L5 endplates overall findings are felt to be due to facet degenerative  changes. If there is any clinical concern for discitis osteomyelitis  or underlying malignant process, contrast enhanced imaging can be  obtained.   2. Persistent and worsened now severe canal stenosis at  L4-L5 due to  facet arthropathy changes and disc osteophyte complex.     I have personally reviewed the examination and initial interpretation  and I agree with the findings.    HERIBERTO OROZCO MD         SYSTEM ID:  [id number]   MR Lumbar Spine w/o Contrast    Narrative    Thoracic and Lumbar spine MRI without contrast 2/15/2022    History: Mid-back pain; Mid-back pain, compression fracture suspected;  Mid-back pain, neuro deficit.    Comparison: CT 3/1/2021 and MRI 6/23/2020    Technique:  Axial T2-weighted, and sagittal T1, STIR, and T2-weighted  images of the lumbar spine without intravenous contrast.     Sagittal T1, STIR, and T2-weighted images of the thoracic spine  without contrast were obtained, with axial T2-weighted images through  levels of interest in the thoracic spine.     Findings:  Thoracic Spine: Exaggerated thoracic kyphosis with unchanged moderate  T7 compression deformity with superior endplate Schmorl node and  approximately 50% height loss in the central vertebral body. No  definite abnormal signal in the thoracic spinal cord at any level.  Spinal canal and neural foramen are patent.  Alignment of the thoracic vertebra appears within normal limits. Bone  marrow signal intensity on noncontrast images appears unremarkable.  Overall no change since 6/23/2020.    Lumbar Spine:  There are 5 type lumbar vertebra, used for the purposes of this  dictation.  The tip of the conus is at L2. Grade 1 anterolisthesis of  L5 on S1, unchanged since prior MRI. Lumbar spine alignment is  otherwise preserved. Regarding the bone marrow, there is abnormal T1  hypointensity surrounding the L4-5 disc space with STIR hyperintensity  in a similar distribution. There is abnormal widening of the L4-5 disc  space and mild narrowing of the L5-S1 disc space. Since 6/23/2020, new  large Schmorl's node like indentation along inferior endplate of L4.  .  On a level by level basis, the findings are as follows:    L1-2: Mild disc  bulge. Bilateral ligamentum flavum hypertrophy.  Minimal small spinal canal narrowing. Neural foramina are patent.    L2-3: Mild bilateral ligamentum flavum hypertrophy. Mild disc bulge.  Mild canal narrowing. Neural foramina are grossly patent.     L3-4: Right foraminal disc protrusion. Bilateral facet hypertrophic  changes. Bilateral ligamentum flavum hypertrophy. Right lateral recess  narrowing. Mild to moderate canal narrowing and narrowing of the right  lateral recess with likely impingement of the right L4 nerve root.  Left neural foramen is patent. Mild right neural foraminal stenosis.     L4-5: Significant disc osteophyte complex and significant bilateral  ligament flavum hypertrophy and facet hypertrophy. There is severe  canal stenosis. There is moderate left, moderate to severe right  foraminal stenoses. There is right foraminal disc protrusion. The  findings appear slightly progressed since 6/23/2020. There is fluid  signal along the left facet joint. The fluid signal also extends  inferiorly from the facet joint posterior to the left lamina. There  are inflammatory changes around right facet joint as well. Abnormal  widened appearance of the disc space with loss of definition of the L4  inferior endplate. There is approximately 80% height loss in the  central L4 vertebral body.     L5-S1: Mild anterolisthesis. Mild to moderate right neural foraminal  narrowing. Spinal canal and left neural foramen are patent.    No abnormal paraspinous collection.   The visualized paraspinous tissues anteriorly are unremarkable.      Impression    Impression:  Thoracic spine: No acute abnormality or abnormal signal in the cord.    Lumbar spine:   1. New L4 inferior endplate compression fracture with 80% loss of  height in the central disc and mild L5 superior endplate compression  deformity. Degenerative signal changes along inferior L4 and superior  L5 endplates overall findings are felt to be due to facet  degenerative  changes. If there is any clinical concern for discitis osteomyelitis  or underlying malignant process, contrast enhanced imaging can be  obtained.   2. Persistent and worsened now severe canal stenosis at L4-L5 due to  facet arthropathy changes and disc osteophyte complex.     I have personally reviewed the examination and initial interpretation  and I agree with the findings.    HERIBERTO OROZCO MD         SYSTEM ID:  [id number]   XR Chest Port 1 View    Impression    RESIDENT PRELIMINARY INTERPRETATION  IMPRESSION: Mixed streaky opacities in the lung bases, likely  atelectasis. Remaining lungs are clear.   CT Thoracic Spine w/o Contrast    Narrative    EXAM: CT THORACIC SPINE W/O CONTRAST  LOCATION: [facility]  DATE/TIME: 2/15/2022 11:57 PM    INDICATION: Back pain.  COMPARISON: MRI thoracic spine 06/22/2020  TECHNIQUE: Routine CT Thoracic Spine without IV contrast. Multiplanar reformats. Dose reduction techniques were used.     FINDINGS:  VERTEBRA:   Diffuse bony demineralization.     T1 vertebral body within normal limits.    T2 and T3 vertebral body mild chronic compression deformities similar to prior exam.    T4, T5, T6 vertebral body mild to moderate chronic compression deformities similar to prior exam.    T7 severe chronic compression deformity similar to prior exam.    T8 and T9 mild to moderate chronic compression deformities similar to prior exam.    T10, T11, and T12 mild chronic compression deformities similar to prior examination.    No acute fracture. No acute post traumatic subluxation.    CANAL/FORAMINA: Mild exaggerated thoracic kyphosis. T6-T7 mild grade 1 anterolisthesis measuring 1 to 2 mm. No additional subluxations. Multilevel spondylosis result in various levels and degrees of central canal stenosis and neural foraminal stenosis.   No critical central canal stenosis. Please defer evaluation of the spondylosis to the MRI thoracic spine  02/15/2022.    PARASPINAL: Vascular calcifications. Mild hiatal hernia. Midesophagus fluid suggesting reflux and/or dysmotility.      Impression    IMPRESSION:  1.  Diffuse bony demineralization.   2.  Thoracic spine demonstrates multiple chronic compression deformities most severe at T7 described above.  3.  Multilevel spondylosis described above.     Lumbar spine CT w/o contrast    Addendum: 2/16/2022    L5-S1 grade 1 anterolisthesis measuring 4 to 5 mm secondary to spondylosis redemonstrated from   MRI lumbar spine 02/15/2022.     L4-L5 high-grade severe central canal stenosis redemonstrated from MRI lumbar spine 02/15/2022.        Narrative    EXAM: CT LUMBAR SPINE W/O CONTRAST  LOCATION: [facility]  DATE/TIME: 2/15/2022 11:57 PM    INDICATION: Back pain  COMPARISON: MRI lumbar spine 06/22/2020, MRI lumbar spine 02/15/2022  TECHNIQUE: Routine CT Lumbar Spine without IV contrast. Multiplanar reformats. Dose reduction techniques were used.     FINDINGS:  VERTEBRA:   Diffuse bony demineralization.     L4 inferior endplate large central concavity resulting in severe central vertebral body compression deformity. Findings redemonstrated from recent prior examination. There is mild bone marrow edema adjacent to the compression deformity on MRI 02/15/2022,   which could reflect chronic fracture with degenerative type I Modic changes or acute/subacute fracture.      L5 vertebral body mild to moderate compression deformity. The superior endplate and mid vertebral body demonstrates bone marrow edema, which could reflect chronic fracture with pronounced degenerative type I Modic changes or acute to subacute fracture.    Remaining lumbar vertebral body heights are within normal limits. Otherwise, no acute fracture.    CANAL/FORAMINA: Multilevel spondylosis result in various levels and degrees of central canal stenosis and neural foraminal stenosis. L4-L5 high-grade severe canal  stenosis. Please defer characterization of the spondylosis and the effect on the thecal sac   and neural foramina to the MRI lumbar spine 02/15/2022.    PARASPINAL: Left kidney midpole small cyst. No specific follow-up recommended. Vascular calcifications.      Impression    IMPRESSION:  1.  L4 inferior endplate large central concavity resulting in severe central vertebral body compression deformity. Findings redemonstrated from recent prior examination. There is mild bone marrow edema adjacent to the compression deformity on MRI   02/15/2022, which could reflect chronic fracture with degenerative type I Modic changes or acute/subacute fracture.      2.  L5 vertebral body mild to moderate compression deformity. The superior endplate and mid vertebral body demonstrates bone marrow edema, which could reflect chronic fracture with pronounced degenerative type I Modic changes or acute to subacute   fracture.    3.  Otherwise, no acute fracture.    4.  Diffuse bony demineralization.    5.  Multilevel spondylosis.    6.  L4-L5 severe high-grade severe central canal stenosis.     MR Lumbar Spine w Contrast    Narrative    EXAM: MR LUMBAR SPINE W CONTRAST  LOCATION: [facility]  DATE/TIME: 2/16/2022 12:14 AM    INDICATION: Back pain  COMPARISON: None.  CONTRAST: 7.5mL Gadavist  TECHNIQUE: Routine Lumbar Spine MRI with IV contrast.    FINDINGS:   Nomenclature is based on 5 lumbar type vertebral bodies.     L4 inferior endplate large central concavity resulting in severe central vertebral body compression deformity. The inferior endplate demonstrates mild enhancement corresponding to mild increased STIR signal on MRI lumbar spine 02/15/2022 likely due to   mild bone marrow edema. Findings may reflect chronic fracture with degenerative type I Modic changes or acute/subacute fracture.      L5 vertebral body mild to moderate compression deformity. The superior endplate and mid  vertebral body demonstrates pronounced enhancement corresponding to increased STIR signal on MRI lumbar spine 02/15/2022 likely due to pronounced bone marrow edema.   Findings may reflect chronic fracture with pronounced degenerative type I Modic changes or acute to subacute fracture.    Remaining lumbar vertebral body heights are within normal limits.     Multilevel spondylosis result in various levels and degrees of central canal stenosis and neural foraminal stenosis. L4-L5 high-grade severe canal stenosis. Please defer characterization of the spondylosis and the effect on the thecal sac and neural   foramina to the MRI lumbar spine 02/15/2022. L4-L5 grade 1 anterolisthesis.    No intradural pathologic enhancement. Anterior epidural space demonstrates engorged venous plexus throughout. No concerning enhancement within the spinal canal.    L4-L5 interspinous bursitis with enhancement        Impression    IMPRESSION:  1.  L4 inferior endplate large central concavity resulting in severe central vertebral body compression deformity. The inferior endplate demonstrates mild enhancement corresponding to mild increased STIR signal on MRI lumbar spine 02/15/2022 likely due   to mild bone marrow edema. Findings may reflect chronic fracture with degenerative type I Modic changes or acute/subacute fracture.      2.  L5 vertebral body mild to moderate compression deformity. The superior endplate and mid vertebral body demonstrates pronounced enhancement corresponding to increased STIR signal on MRI lumbar spine 02/15/2022 likely due to pronounced bone marrow   edema. Findings may reflect chronic fracture with pronounced degenerative type I Modic changes or acute to subacute fracture.    3.  L4-L5 discitis osteomyelitis less unlikely given no significant increased T2/STIR signal (fluid signal) within the L4-L5 disc space on noncontrast MRI lumbar spine 02/15/2022. Morphology makes metastatic disease unlikely.     4.   Multilevel spondylosis. L5-S1 grade 1 anterolisthesis.    5.  L4-L5 severe high-grade thecal sac stenosis redemonstrated from noncontrast MR lumbar spine 02/15/2022.    6.  L4-L5 interspinous bursitis.     7.  No concerning enhancement within the spinal canal.

## 2022-02-16 NOTE — PROGRESS NOTES
Orthopedic/Sports Medicine Fracture Triage    Incoming call/or message from referral team member.    Fracture type: Spine (L4).    The patient is in a  nothing.    Date of injury Unspecified.    Triaged by: BHAVNA Gould.    Determined to be managed Surgically.    Needs to be seen ASAP.    Additional Comments/information: Patient can be seen by Dr. Larkin today or next available with Dr. Wells to review concern for L4 fracture as well as labs. per BHAVNA Gould.    Isra Thurman, EMT

## 2022-02-16 NOTE — PROGRESS NOTES
Resident/Fellow Attestation   I, Sal Tatum Jr., was present with the medical/DAVID student who participated in the service and in the documentation of the note.  I have verified the history and personally performed the physical exam and medical decision making.  I agree with the assessment and plan of care as documented in the note.      Sal Tatum Jr., MD  PGY3  Date of Service (when I saw the patient): 02/16/22    [facility]    Progress Note - Medicine Service, [facility] TEAM 2       Date of Admission:  2/15/2022    Assessment & Plan        Junaid Gunter is a 71 YO Kazakh-speaking F with a h/o chronic hepatitis C, peripheral neuropathy, osteoporosis, hypothyroidism, hypertension, chronic thrombocytopenia and degenerative disc disease who was admitted on 2/15/2022 with poor ambulation, LR compression fracture, and incidentally found to be COVID-19 positive.    Changes today:  - NS consulted, Ordered TSLO brace  - Pharm to assist with home med list  - PT/OT consulted, will assess after receiving TLSO brace  - Lumbar XR's    # L4 endplate compression fracture with radiculopathy  # Chronic T7 compression fracture  Hx T7 compression fracture 6/2020. Last seen by neurosurgery outpatient 3/2021 at which time imaging demonstrated stable appearance of chronic T7 fracture with multilevel lumbar spondylosis and severe stenosis of L4-L5. On arrival to ED 2/15/22, patient reported worsening back and radicular pain in addition to inability to ambulate x1 week. XR lumbar spine, MRI lumbar/thoracic spine, and CT lumbar/thoracic spine checked on admission per NS recs. Imaging showed a new L4 inferior endplate compression fracture with persistent and worsened severe canal stenosis L4-L5.  - Neurosurgery consulted:   > Recommend TSLO brace when out of bed   > Obtain lumbar AP/lateral upright x-rays with brace   > Plan to f/u OP if symptoms persist to discuss potential  surgical intervention; no neurosurgical intervention warranted at this time  - PT/OT consulted, pending dispo rec's  - Pain control   > Tylenol PRN   > oxycodone 2.5 mg q4h PRN for breakthrough pain   > PTA gabapentin 300 mg BID; hold if evidence of STACY    # Osteoporosis   Previously on alendronate in 6/2020, unclear when discontinued.  - Plan for PCP f/u on discharge for consideration of further bisphosphonate therapy    # Incidentally COVID positive, asymptomatic  Asymptomatic; denies any SOB, cough, loss of taste/smell. Doing well on RA. Received COVID-19 moderna immunizations 3/17/21 and 4/14/21. Has not received booster. D dimer 1.04 on 2/16/22.  - DVT ppx: Heparin SQ  - Patient was not taking any COVID prophylactic medications on admission; no indication for remdesivir, steroids, or monoclonal antibodies at this time  - Offer COVID-19 booster on discharge or OP f/u    # Increased creatinine, suspect pre-renal d/t hypovolemia  2/16/22 creatinine 1.07 (0.91 on 2/15/22) and GFR 55 (67 on 2/15/22).  - BMP daily to monitor for STACY  - 1L LR, provide diet, encourage PO intake  - Will hold gabapentin and losartan if evidence of STACY on subsequent BMP    Stable / Chronic Problems  ================================================    # Chronic thrombocytopenia  Platelet count wnl on admission.  - CBC daily    # Chronic anemia  2/16/22 11.1. Baseline appears to be 9-10.  - CBC daily    # Hypertension  Per 3/10/20 OP visit: Patient was previously on Losartan/HCTZ and amlodipine but currently only on Losartan 100 mg daily.  - PTA losartan 100 mg daily; hold if evidence of STACY    # Hypothyroidism  Per 3/10/20 OP visit: Patient was initially taking LT 25 mcg but she took only for 8 days and stopped it 2/2020 as she felt that it caused edema.  - PTA dessicated thyroid tablet 15 mg daily    # Chronic hepatitis C  Per 3/10/20 OP visit: Patient saw  Hepatology and completed required testing. Patient was advised to take the  medication Mavyret but she had difficulty getting it due to language barrier. Unclear if treatment was completed.  - F/u OP      Diet: Regular Diet Adult    DVT Prophylaxis: Heparin subcutaneous, Pneumatic Compression Devices  Tong Catheter: Not present  Fluids: LR bolus  Central Lines: None  Cardiac Monitoring: None  Code Status: Full Code      Disposition Plan   Expected Discharge: greater than two midnights     Anticipated discharge location:  TCU     The patient's care was discussed with the Attending Physician, Dr. Saad Hale.    Kate Morales, MS3  Medicine Service, [address]  Securely message with the Vocera Web Console (learn more here)  Text page via [facility] Paging/Directory   Please see signed in provider for up to date coverage information  ______________________________________________________________________    Interval History   Patient reports that her pain had been progressively worsening, particularly over the past 1 week. In addition to pain, she has had increased difficulty walking; states that she has only been able to walk with significant assistance. Reports that back pain and shooting leg pains have improved since admission and starting current pain control measures. Has back pain while sitting on the toilet but denies any changes to bowel or bladder habits.    Patient was incidentally COVID positive on admission. Currently asymptomatic. Denies any SOB, fevers, chills, cough, or congestion.    Data reviewed today: I reviewed all medications, new labs and imaging results over the last 24 hours.     Physical Exam   Vital Signs: Temp: 96.8  F (36  C) Temp src: Oral BP: (!) 144/57 Pulse: 72   Resp: 16 SpO2: 92 % O2 Device: None (Room air)    Weight: 176 lbs 9.6 oz  General: Appears fatigued. NAD.  Respiratory: CTAB. Nonlabored breathing on RA. Speaking in complete sentences.  Cardiovascular: RRR. No murmurs, rubs, or gallops. Skin  warm and well perfused.  Musculoskeletal: No edema. Able to move extremities.  Neurologic: Alert. Responding to questions appropriately.  Psych: Appropriate mood and affect.    Data   Recent Labs   Lab 02/16/22  0734 02/15/22  1353   WBC 4.5 3.4*   HGB 11.1* 11.5*   * 100    155    139   POTASSIUM 4.8 4.1   CHLORIDE 102 106   CO2 27 29   BUN 16 11   CR 1.07* 0.91   ANIONGAP 5 4   TACHO 9.3 9.6   * 90   ALBUMIN  --  3.6   PROTTOTAL  --  8.3   BILITOTAL  --  0.3   ALKPHOS  --  132   ALT  --  21   AST  --  22     Recent Results (from the past 24 hour(s))   XR Lumbar Spine 2/3 Views    Narrative    EXAM: XR LUMBAR SPINE 2-3 VIEWS  2/15/2022 3:30 PM      HISTORY: low back pain, sciatica    COMPARISON: MRI 6/23/2020    FINDINGS: Portable AP and crosstable lateral view of the lumbar spine.    Bony detail is significantly limited, especially on the lateral view.    5 lumbar type vertebral bodies.    Possible anterior wedging of L4 on L5, poorly detailed. Potential disc  space loss is obscured. Lower lumbar facet hypertrophy.    Vascular calcifications .      Impression    IMPRESSION: Limited bony detail on the lateral view. Spondylosis  poorly detailed. Possible anterior wedging of L4 on L5.         NATHALIE GAMING MD (Joe)         SYSTEM ID:  [id number]   MR Thoracic Spine w/o Contrast    Narrative    Thoracic and Lumbar spine MRI without contrast 2/15/2022    History: Mid-back pain; Mid-back pain, compression fracture suspected;  Mid-back pain, neuro deficit.    Comparison: CT 3/1/2021 and MRI 6/23/2020    Technique:  Axial T2-weighted, and sagittal T1, STIR, and T2-weighted  images of the lumbar spine without intravenous contrast.     Sagittal T1, STIR, and T2-weighted images of the thoracic spine  without contrast were obtained, with axial T2-weighted images through  levels of interest in the thoracic spine.     Findings:  Thoracic Spine: Exaggerated thoracic kyphosis with unchanged  moderate  T7 compression deformity with superior endplate Schmorl node and  approximately 50% height loss in the central vertebral body. No  definite abnormal signal in the thoracic spinal cord at any level.  Spinal canal and neural foramen are patent.  Alignment of the thoracic vertebra appears within normal limits. Bone  marrow signal intensity on noncontrast images appears unremarkable.  Overall no change since 6/23/2020.    Lumbar Spine:  There are 5 type lumbar vertebra, used for the purposes of this  dictation.  The tip of the conus is at L2. Grade 1 anterolisthesis of  L5 on S1, unchanged since prior MRI. Lumbar spine alignment is  otherwise preserved. Regarding the bone marrow, there is abnormal T1  hypointensity surrounding the L4-5 disc space with STIR hyperintensity  in a similar distribution. There is abnormal widening of the L4-5 disc  space and mild narrowing of the L5-S1 disc space. Since 6/23/2020, new  large Schmorl's node like indentation along inferior endplate of L4.  .  On a level by level basis, the findings are as follows:    L1-2: Mild disc bulge. Bilateral ligamentum flavum hypertrophy.  Minimal small spinal canal narrowing. Neural foramina are patent.    L2-3: Mild bilateral ligamentum flavum hypertrophy. Mild disc bulge.  Mild canal narrowing. Neural foramina are grossly patent.     L3-4: Right foraminal disc protrusion. Bilateral facet hypertrophic  changes. Bilateral ligamentum flavum hypertrophy. Right lateral recess  narrowing. Mild to moderate canal narrowing and narrowing of the right  lateral recess with likely impingement of the right L4 nerve root.  Left neural foramen is patent. Mild right neural foraminal stenosis.     L4-5: Significant disc osteophyte complex and significant bilateral  ligament flavum hypertrophy and facet hypertrophy. There is severe  canal stenosis. There is moderate left, moderate to severe right  foraminal stenoses. There is right foraminal disc protrusion.  The  findings appear slightly progressed since 6/23/2020. There is fluid  signal along the left facet joint. The fluid signal also extends  inferiorly from the facet joint posterior to the left lamina. There  are inflammatory changes around right facet joint as well. Abnormal  widened appearance of the disc space with loss of definition of the L4  inferior endplate. There is approximately 80% height loss in the  central L4 vertebral body.     L5-S1: Mild anterolisthesis. Mild to moderate right neural foraminal  narrowing. Spinal canal and left neural foramen are patent.    No abnormal paraspinous collection.   The visualized paraspinous tissues anteriorly are unremarkable.      Impression    Impression:  Thoracic spine: No acute abnormality or abnormal signal in the cord.    Lumbar spine:   1. New L4 inferior endplate compression fracture with 80% loss of  height in the central disc and mild L5 superior endplate compression  deformity. Degenerative signal changes along inferior L4 and superior  L5 endplates overall findings are felt to be due to facet degenerative  changes. If there is any clinical concern for discitis osteomyelitis  or underlying malignant process, contrast enhanced imaging can be  obtained.   2. Persistent and worsened now severe canal stenosis at L4-L5 due to  facet arthropathy changes and disc osteophyte complex.     I have personally reviewed the examination and initial interpretation  and I agree with the findings.    HERIBERTO OROZCO MD         SYSTEM ID:  [id number]   MR Lumbar Spine w/o Contrast    Narrative    Thoracic and Lumbar spine MRI without contrast 2/15/2022    History: Mid-back pain; Mid-back pain, compression fracture suspected;  Mid-back pain, neuro deficit.    Comparison: CT 3/1/2021 and MRI 6/23/2020    Technique:  Axial T2-weighted, and sagittal T1, STIR, and T2-weighted  images of the lumbar spine without intravenous contrast.     Sagittal T1, STIR, and T2-weighted images of the  thoracic spine  without contrast were obtained, with axial T2-weighted images through  levels of interest in the thoracic spine.     Findings:  Thoracic Spine: Exaggerated thoracic kyphosis with unchanged moderate  T7 compression deformity with superior endplate Schmorl node and  approximately 50% height loss in the central vertebral body. No  definite abnormal signal in the thoracic spinal cord at any level.  Spinal canal and neural foramen are patent.  Alignment of the thoracic vertebra appears within normal limits. Bone  marrow signal intensity on noncontrast images appears unremarkable.  Overall no change since 6/23/2020.    Lumbar Spine:  There are 5 type lumbar vertebra, used for the purposes of this  dictation.  The tip of the conus is at L2. Grade 1 anterolisthesis of  L5 on S1, unchanged since prior MRI. Lumbar spine alignment is  otherwise preserved. Regarding the bone marrow, there is abnormal T1  hypointensity surrounding the L4-5 disc space with STIR hyperintensity  in a similar distribution. There is abnormal widening of the L4-5 disc  space and mild narrowing of the L5-S1 disc space. Since 6/23/2020, new  large Schmorl's node like indentation along inferior endplate of L4.  .  On a level by level basis, the findings are as follows:    L1-2: Mild disc bulge. Bilateral ligamentum flavum hypertrophy.  Minimal small spinal canal narrowing. Neural foramina are patent.    L2-3: Mild bilateral ligamentum flavum hypertrophy. Mild disc bulge.  Mild canal narrowing. Neural foramina are grossly patent.     L3-4: Right foraminal disc protrusion. Bilateral facet hypertrophic  changes. Bilateral ligamentum flavum hypertrophy. Right lateral recess  narrowing. Mild to moderate canal narrowing and narrowing of the right  lateral recess with likely impingement of the right L4 nerve root.  Left neural foramen is patent. Mild right neural foraminal stenosis.     L4-5: Significant disc osteophyte complex and significant  bilateral  ligament flavum hypertrophy and facet hypertrophy. There is severe  canal stenosis. There is moderate left, moderate to severe right  foraminal stenoses. There is right foraminal disc protrusion. The  findings appear slightly progressed since 6/23/2020. There is fluid  signal along the left facet joint. The fluid signal also extends  inferiorly from the facet joint posterior to the left lamina. There  are inflammatory changes around right facet joint as well. Abnormal  widened appearance of the disc space with loss of definition of the L4  inferior endplate. There is approximately 80% height loss in the  central L4 vertebral body.     L5-S1: Mild anterolisthesis. Mild to moderate right neural foraminal  narrowing. Spinal canal and left neural foramen are patent.    No abnormal paraspinous collection.   The visualized paraspinous tissues anteriorly are unremarkable.      Impression    Impression:  Thoracic spine: No acute abnormality or abnormal signal in the cord.    Lumbar spine:   1. New L4 inferior endplate compression fracture with 80% loss of  height in the central disc and mild L5 superior endplate compression  deformity. Degenerative signal changes along inferior L4 and superior  L5 endplates overall findings are felt to be due to facet degenerative  changes. If there is any clinical concern for discitis osteomyelitis  or underlying malignant process, contrast enhanced imaging can be  obtained.   2. Persistent and worsened now severe canal stenosis at L4-L5 due to  facet arthropathy changes and disc osteophyte complex.     I have personally reviewed the examination and initial interpretation  and I agree with the findings.    HERIBERTO OROZCO MD         SYSTEM ID:  [id number]   XR Chest Port 1 View    Narrative    EXAM: XR CHEST 1 VW 2/15/2022      HISTORY: covid.    COMPARISON: CT 6/20/2020.     TECHNIQUE: Frontal view of the chest.    FINDINGS: Hyperinflated lungs with mixed hazy/streaky opacities in  the  lung bases. Remaining lungs are relatively clear without focal  consolidation. Cardiomediastinal silhouette is within normal limits.  The pulmonary vasculature is distinct. No acute abnormality of the  bony thorax and visualized upper abdomen. Unchanged convex right  thoracic scoliosis.      Impression    IMPRESSION: Mixed streaky opacities in the lung bases, likely  atelectasis. Remaining lungs are clear.    I have personally reviewed the examination and initial interpretation  and I agree with the findings.    LYLY ZELAYA MD         SYSTEM ID:  [id number]   CT Thoracic Spine w/o Contrast    Narrative    EXAM: CT THORACIC SPINE W/O CONTRAST  LOCATION: [facility]  DATE/TIME: 2/15/2022 11:57 PM    INDICATION: Back pain.  COMPARISON: MRI thoracic spine 06/22/2020  TECHNIQUE: Routine CT Thoracic Spine without IV contrast. Multiplanar reformats. Dose reduction techniques were used.     FINDINGS:  VERTEBRA:   Diffuse bony demineralization.     T1 vertebral body within normal limits.    T2 and T3 vertebral body mild chronic compression deformities similar to prior exam.    T4, T5, T6 vertebral body mild to moderate chronic compression deformities similar to prior exam.    T7 severe chronic compression deformity similar to prior exam.    T8 and T9 mild to moderate chronic compression deformities similar to prior exam.    T10, T11, and T12 mild chronic compression deformities similar to prior examination.    No acute fracture. No acute post traumatic subluxation.    CANAL/FORAMINA: Mild exaggerated thoracic kyphosis. T6-T7 mild grade 1 anterolisthesis measuring 1 to 2 mm. No additional subluxations. Multilevel spondylosis result in various levels and degrees of central canal stenosis and neural foraminal stenosis.   No critical central canal stenosis. Please defer evaluation of the spondylosis to the MRI thoracic spine 02/15/2022.    PARASPINAL: Vascular calcifications.  Mild hiatal hernia. Midesophagus fluid suggesting reflux and/or dysmotility.      Impression    IMPRESSION:  1.  Diffuse bony demineralization.   2.  Thoracic spine demonstrates multiple chronic compression deformities most severe at T7 described above.  3.  Multilevel spondylosis described above.     Lumbar spine CT w/o contrast    Addendum: 2/16/2022    L5-S1 grade 1 anterolisthesis measuring 4 to 5 mm secondary to spondylosis redemonstrated from   MRI lumbar spine 02/15/2022.     L4-L5 high-grade severe central canal stenosis redemonstrated from MRI lumbar spine 02/15/2022.        Narrative    EXAM: CT LUMBAR SPINE W/O CONTRAST  LOCATION: [facility]  DATE/TIME: 2/15/2022 11:57 PM    INDICATION: Back pain  COMPARISON: MRI lumbar spine 06/22/2020, MRI lumbar spine 02/15/2022  TECHNIQUE: Routine CT Lumbar Spine without IV contrast. Multiplanar reformats. Dose reduction techniques were used.     FINDINGS:  VERTEBRA:   Diffuse bony demineralization.     L4 inferior endplate large central concavity resulting in severe central vertebral body compression deformity. Findings redemonstrated from recent prior examination. There is mild bone marrow edema adjacent to the compression deformity on MRI 02/15/2022,   which could reflect chronic fracture with degenerative type I Modic changes or acute/subacute fracture.      L5 vertebral body mild to moderate compression deformity. The superior endplate and mid vertebral body demonstrates bone marrow edema, which could reflect chronic fracture with pronounced degenerative type I Modic changes or acute to subacute fracture.    Remaining lumbar vertebral body heights are within normal limits. Otherwise, no acute fracture.    CANAL/FORAMINA: Multilevel spondylosis result in various levels and degrees of central canal stenosis and neural foraminal stenosis. L4-L5 high-grade severe canal stenosis. Please defer characterization of the  spondylosis and the effect on the thecal sac   and neural foramina to the MRI lumbar spine 02/15/2022.    PARASPINAL: Left kidney midpole small cyst. No specific follow-up recommended. Vascular calcifications.      Impression    IMPRESSION:  1.  L4 inferior endplate large central concavity resulting in severe central vertebral body compression deformity. Findings redemonstrated from recent prior examination. There is mild bone marrow edema adjacent to the compression deformity on MRI   02/15/2022, which could reflect chronic fracture with degenerative type I Modic changes or acute/subacute fracture.      2.  L5 vertebral body mild to moderate compression deformity. The superior endplate and mid vertebral body demonstrates bone marrow edema, which could reflect chronic fracture with pronounced degenerative type I Modic changes or acute to subacute   fracture.    3.  Otherwise, no acute fracture.    4.  Diffuse bony demineralization.    5.  Multilevel spondylosis.    6.  L4-L5 severe high-grade severe central canal stenosis.     MR Lumbar Spine w Contrast    Narrative    EXAM: MR LUMBAR SPINE W CONTRAST  LOCATION: [facility]  DATE/TIME: 2/16/2022 12:14 AM    INDICATION: Back pain  COMPARISON: None.  CONTRAST: 7.5mL Gadavist  TECHNIQUE: Routine Lumbar Spine MRI with IV contrast.    FINDINGS:   Nomenclature is based on 5 lumbar type vertebral bodies.     L4 inferior endplate large central concavity resulting in severe central vertebral body compression deformity. The inferior endplate demonstrates mild enhancement corresponding to mild increased STIR signal on MRI lumbar spine 02/15/2022 likely due to   mild bone marrow edema. Findings may reflect chronic fracture with degenerative type I Modic changes or acute/subacute fracture.      L5 vertebral body mild to moderate compression deformity. The superior endplate and mid vertebral body demonstrates pronounced enhancement  corresponding to increased STIR signal on MRI lumbar spine 02/15/2022 likely due to pronounced bone marrow edema.   Findings may reflect chronic fracture with pronounced degenerative type I Modic changes or acute to subacute fracture.    Remaining lumbar vertebral body heights are within normal limits.     Multilevel spondylosis result in various levels and degrees of central canal stenosis and neural foraminal stenosis. L4-L5 high-grade severe canal stenosis. Please defer characterization of the spondylosis and the effect on the thecal sac and neural   foramina to the MRI lumbar spine 02/15/2022. L4-L5 grade 1 anterolisthesis.    No intradural pathologic enhancement. Anterior epidural space demonstrates engorged venous plexus throughout. No concerning enhancement within the spinal canal.    L4-L5 interspinous bursitis with enhancement        Impression    IMPRESSION:  1.  L4 inferior endplate large central concavity resulting in severe central vertebral body compression deformity. The inferior endplate demonstrates mild enhancement corresponding to mild increased STIR signal on MRI lumbar spine 02/15/2022 likely due   to mild bone marrow edema. Findings may reflect chronic fracture with degenerative type I Modic changes or acute/subacute fracture.      2.  L5 vertebral body mild to moderate compression deformity. The superior endplate and mid vertebral body demonstrates pronounced enhancement corresponding to increased STIR signal on MRI lumbar spine 02/15/2022 likely due to pronounced bone marrow   edema. Findings may reflect chronic fracture with pronounced degenerative type I Modic changes or acute to subacute fracture.    3.  L4-L5 discitis osteomyelitis less unlikely given no significant increased T2/STIR signal (fluid signal) within the L4-L5 disc space on noncontrast MRI lumbar spine 02/15/2022. Morphology makes metastatic disease unlikely.     4.  Multilevel spondylosis. L5-S1 grade 1 anterolisthesis.    5.   L4-L5 severe high-grade thecal sac stenosis redemonstrated from noncontrast MR lumbar spine 02/15/2022.    6.  L4-L5 interspinous bursitis.     7.  No concerning enhancement within the spinal canal.

## 2022-02-16 NOTE — PLAN OF CARE
BP (!) 147/63 (BP Location: Right arm)   Pulse 75   Temp (!) 95.4  F (35.2  C) (Axillary)   Resp 16   Wt 80.1 kg (176 lb 9.6 oz)   SpO2 99%   BMI 35.67 kg/m       Shift: [id number]    Status: COVID +, impaired mobility and ADLs   Neuro: A&O x4. Tongan speaking. Able to make needs known   Respiratory: WDL on RA sating > 90. Denies SOB   Cardiac: WDL, denies cardiac chest pain  GI/: Voids w/out difficulty via BSC. LBM 2/15/22  Diet: Regular, tolerating well   Pain: Bilateral leg pain managed w/ PRN oxy q 4h   Incision/Drains: none   IV Access: R PIV running LR bolus  Labs: Reviewed   Activity: Assist x1 w/ wlaker. Remind pt to call for help before getting OOB.     New changes this shift: Pt c/o severe bilateral leg pain. Team paged and 1x oxy 5mg given with relief. TLSO brace was not placed until late this evening.   Plan: X-ray lumbar spine tonight, PT/OT tomorrow

## 2022-02-16 NOTE — PLAN OF CARE
SHIFT [id number]    Cardio: denies chest pain  Resp: No resp distress, (-) cough, all lungs were clear  /GI: (+) dysuria  Activity: Walker at home: Independent  Neuro: AOX4, pt needs   Pain: 5/10, received 4 doses of Oxycodone  VS: /86 (BP Location: Left arm, Patient Position: Semi-Irizarry's)   Pulse 74   Temp 97.6  F (36.4  C) (Oral)   Resp 17   Wt 77.6 kg (171 lb)   SpO2 98%   BMI 34.54 kg/m       Given report to Laurie

## 2022-02-17 ENCOUNTER — APPOINTMENT (OUTPATIENT)
Dept: GENERAL RADIOLOGY | Facility: CLINIC | Age: 72
End: 2022-02-17
Payer: COMMERCIAL

## 2022-02-17 ENCOUNTER — APPOINTMENT (OUTPATIENT)
Dept: PHYSICAL THERAPY | Facility: CLINIC | Age: 72
End: 2022-02-17
Payer: COMMERCIAL

## 2022-02-17 DIAGNOSIS — S32.000A LUMBAR COMPRESSION FRACTURE (H): Primary | ICD-10-CM

## 2022-02-17 PROBLEM — K21.9 GASTROESOPHAGEAL REFLUX DISEASE: Status: ACTIVE | Noted: 2020-03-10

## 2022-02-17 LAB
ANION GAP SERPL CALCULATED.3IONS-SCNC: 7 MMOL/L (ref 3–14)
BUN SERPL-MCNC: 25 MG/DL (ref 7–30)
CALCIUM SERPL-MCNC: 8.6 MG/DL (ref 8.5–10.1)
CHLORIDE BLD-SCNC: 102 MMOL/L (ref 94–109)
CO2 SERPL-SCNC: 25 MMOL/L (ref 20–32)
CREAT SERPL-MCNC: 1.06 MG/DL (ref 0.52–1.04)
ERYTHROCYTE [DISTWIDTH] IN BLOOD BY AUTOMATED COUNT: 14.6 % (ref 10–15)
GFR SERPL CREATININE-BSD FRML MDRD: 56 ML/MIN/1.73M2
GLUCOSE BLD-MCNC: 91 MG/DL (ref 70–99)
HCT VFR BLD AUTO: 31.6 % (ref 35–47)
HGB BLD-MCNC: 10 G/DL (ref 11.7–15.7)
MCH RBC QN AUTO: 32.2 PG (ref 26.5–33)
MCHC RBC AUTO-ENTMCNC: 31.6 G/DL (ref 31.5–36.5)
MCV RBC AUTO: 102 FL (ref 78–100)
PLATELET # BLD AUTO: 135 10E3/UL (ref 150–450)
POTASSIUM BLD-SCNC: 4.5 MMOL/L (ref 3.4–5.3)
RBC # BLD AUTO: 3.11 10E6/UL (ref 3.8–5.2)
SODIUM SERPL-SCNC: 134 MMOL/L (ref 133–144)
WBC # BLD AUTO: 5.3 10E3/UL (ref 4–11)

## 2022-02-17 PROCEDURE — 99225 PR SUBSEQUENT OBSERVATION CARE,LEVEL II: CPT | Performed by: NURSE PRACTITIONER

## 2022-02-17 PROCEDURE — 258N000003 HC RX IP 258 OP 636

## 2022-02-17 PROCEDURE — 96374 THER/PROPH/DIAG INJ IV PUSH: CPT

## 2022-02-17 PROCEDURE — 97161 PT EVAL LOW COMPLEX 20 MIN: CPT | Mod: GP

## 2022-02-17 PROCEDURE — G0378 HOSPITAL OBSERVATION PER HR: HCPCS

## 2022-02-17 PROCEDURE — 250N000011 HC RX IP 250 OP 636

## 2022-02-17 PROCEDURE — 250N000013 HC RX MED GY IP 250 OP 250 PS 637: Performed by: STUDENT IN AN ORGANIZED HEALTH CARE EDUCATION/TRAINING PROGRAM

## 2022-02-17 PROCEDURE — 250N000009 HC RX 250

## 2022-02-17 PROCEDURE — 99207 PR CDG-MDM COMPONENT: MEETS MODERATE - DOWN CODED: CPT | Performed by: INTERNAL MEDICINE

## 2022-02-17 PROCEDURE — 72100 X-RAY EXAM L-S SPINE 2/3 VWS: CPT

## 2022-02-17 PROCEDURE — 99225 PR SUBSEQUENT OBSERVATION CARE,LEVEL II: CPT | Mod: GC | Performed by: INTERNAL MEDICINE

## 2022-02-17 PROCEDURE — 82310 ASSAY OF CALCIUM: CPT | Performed by: STUDENT IN AN ORGANIZED HEALTH CARE EDUCATION/TRAINING PROGRAM

## 2022-02-17 PROCEDURE — 99207 PR NO CHARGE LOS: CPT | Performed by: STUDENT IN AN ORGANIZED HEALTH CARE EDUCATION/TRAINING PROGRAM

## 2022-02-17 PROCEDURE — 97530 THERAPEUTIC ACTIVITIES: CPT | Mod: GP

## 2022-02-17 PROCEDURE — 96372 THER/PROPH/DIAG INJ SC/IM: CPT

## 2022-02-17 PROCEDURE — 250N000013 HC RX MED GY IP 250 OP 250 PS 637

## 2022-02-17 PROCEDURE — 85027 COMPLETE CBC AUTOMATED: CPT | Performed by: STUDENT IN AN ORGANIZED HEALTH CARE EDUCATION/TRAINING PROGRAM

## 2022-02-17 PROCEDURE — 36415 COLL VENOUS BLD VENIPUNCTURE: CPT | Performed by: STUDENT IN AN ORGANIZED HEALTH CARE EDUCATION/TRAINING PROGRAM

## 2022-02-17 PROCEDURE — 99207 PR CDG-CODE CATEGORY CHANGED: CPT | Performed by: INTERNAL MEDICINE

## 2022-02-17 PROCEDURE — 72100 X-RAY EXAM L-S SPINE 2/3 VWS: CPT | Mod: 26 | Performed by: RADIOLOGY

## 2022-02-17 RX ADMIN — LOSARTAN POTASSIUM 100 MG: 100 TABLET, FILM COATED ORAL at 09:06

## 2022-02-17 RX ADMIN — HEPARIN SODIUM 5000 UNITS: 5000 INJECTION, SOLUTION INTRAVENOUS; SUBCUTANEOUS at 06:49

## 2022-02-17 RX ADMIN — THYROID, PORCINE 15 MG: 15 TABLET ORAL at 09:06

## 2022-02-17 RX ADMIN — REMDESIVIR 200 MG: 100 INJECTION, POWDER, LYOPHILIZED, FOR SOLUTION INTRAVENOUS at 19:20

## 2022-02-17 RX ADMIN — GABAPENTIN 300 MG: 300 CAPSULE ORAL at 09:06

## 2022-02-17 RX ADMIN — OXYCODONE HYDROCHLORIDE 2.5 MG: 5 TABLET ORAL at 01:44

## 2022-02-17 RX ADMIN — OXYCODONE HYDROCHLORIDE 2.5 MG: 5 TABLET ORAL at 06:43

## 2022-02-17 RX ADMIN — HEPARIN SODIUM 5000 UNITS: 5000 INJECTION, SOLUTION INTRAVENOUS; SUBCUTANEOUS at 18:50

## 2022-02-17 RX ADMIN — GABAPENTIN 300 MG: 300 CAPSULE ORAL at 21:56

## 2022-02-17 RX ADMIN — SODIUM CHLORIDE 50 ML: 9 INJECTION, SOLUTION INTRAVENOUS at 22:00

## 2022-02-17 RX ADMIN — ASPIRIN 81 MG: 81 TABLET, COATED ORAL at 09:06

## 2022-02-17 RX ADMIN — OXYCODONE HYDROCHLORIDE 2.5 MG: 5 TABLET ORAL at 18:50

## 2022-02-17 RX ADMIN — OXYCODONE HYDROCHLORIDE 2.5 MG: 5 TABLET ORAL at 11:46

## 2022-02-17 NOTE — PROGRESS NOTES
SPIRITUAL HEALTH SERVICES  SPIRITUAL ASSESSMENT Progress Note  [facility] ([place]) UU U7B     REFERRAL SOURCE: Lead     I tele- visited with the pt's son. He  welcomed the visit and mentioned beside the Covid his mother is struggling with back injuries. He  shared that his mom lives alone, her children and grand children take care of her in her home. He requested a prayer. I prayed for him may Kenny dante his mother complete recovery.    PLAN: [facility] is available for support and follow up for her duration of stay    Princess Nicolein Resident  Phone: [phone]

## 2022-02-17 NOTE — PLAN OF CARE
PRIOR TO DISCHARGE        -diagnostic tests and consults completed and resulted NOT MET, awaiting XRAY and PT/OT  -vital signs normal or at patient baseline MET  -adequate pain control on oral analgesics MET     Nurse to notify provider when observation goals have been met and patient is ready for discharge.

## 2022-02-17 NOTE — PROGRESS NOTES
[facility], [place]   Neurosurgery Progress Note:    Assessment: Junaid Gunter is a 72 year old female admitted with low back pain and radiculopathy with newly diagnosed L4 endplate compression fracture.  MRI lumbar spine completed without evidence of metastatic disease.        Clinically Significant Risk Factors Present on Admission              # Platelet Defect: home medication list includes an antiplatelet medication     Thrombocytopenia       Plan:  - Recommend TLSO brace when out of bed   - No neurosurgical intervention warranted at this time, patient may follow-up in Neurosurgery clinic with Dr. Woods at [facility] who has seen her in the past in 6 weeks with repeat x-rays   -  Neurosurgery will sign off at this time      PATITO Diaz, CNP  2/16/2022  Department of Neurosurgery  Pager: [phone]      Interval History:  Patient interviewed via  this morning.  States low back pain has improved since yesterday.  TLSO brace was delivered, upright x-rays have been obtained and alignment is maintained.            Objective:   Temp:  [95.4  F (35.2  C)-97.3  F (36.3  C)] 96.7  F (35.9  C)  Pulse:  [72-85] 80  Resp:  [16-18] 18  BP: (141-155)/(48-69) 150/48  SpO2:  [92 %-99 %] 97 %  I/O last 3 completed shifts:  In: -   Out: 650 [Urine:650]    Gen: Appears comfortable, NAD  Neurologic:  - Alert & Oriented to person, place, time, and situation  - Follows commands briskly  - Speech fluent, spontaneous. No aphasia or dysarthria.  - No gaze preference. No apparent hemineglect.  - PERRL, EOMI  - Strong eye closure, jaw clench, and cheek puff  - Face symmetric with sensation intact to light touch  - Palate elevates symmetrically, uvula midline, tongue protrudes midline  - Trapezii and sternocleidomastoid muscles 5/5 bilaterally  - No pronator drift     Del Tr Bi WE WF Gr   R 5 5 5 5 5 5   L 5 5 5 5 5 5    HF KE KF DF PF EHL   R 5 5 5 5 5 5   L 5 5 5 5 5 5     Reflexes 2+  throughout    Sensation intact and symmetric to light touch throughout    LABS  Recent Labs   Lab Test 02/16/22  0734 02/15/22  1353 06/24/20  0622   WBC 4.5 3.4* 4.2   HGB 11.1* 11.5* 9.0*   * 100 102*    155 113*       Recent Labs   Lab Test 02/16/22  0734 02/15/22  1353 06/24/20  0622    139 137   POTASSIUM 4.8 4.1 4.6   CHLORIDE 102 106 106   CO2 27 29 27   BUN 16 11 17   CR 1.07* 0.91 1.03   ANIONGAP 5 4 4   TACHO 9.3 9.6 8.1*   * 90 88       IMAGING    Recent Results (from the past 24 hour(s))   XR Lumbar Spine 2/3 Views    Narrative    EXAM: XR LUMBAR SPINE 2-3 VIEWS  2/15/2022 3:30 PM      HISTORY: low back pain, sciatica    COMPARISON: MRI 6/23/2020    FINDINGS: Portable AP and crosstable lateral view of the lumbar spine.    Bony detail is significantly limited, especially on the lateral view.    5 lumbar type vertebral bodies.    Possible anterior wedging of L4 on L5, poorly detailed. Potential disc  space loss is obscured. Lower lumbar facet hypertrophy.    Vascular calcifications .      Impression    IMPRESSION: Limited bony detail on the lateral view. Spondylosis  poorly detailed. Possible anterior wedging of L4 on L5.         NATHALIE GAMING MD (Joe)         SYSTEM ID:  [id number]   MR Thoracic Spine w/o Contrast    Narrative    Thoracic and Lumbar spine MRI without contrast 2/15/2022    History: Mid-back pain; Mid-back pain, compression fracture suspected;  Mid-back pain, neuro deficit.    Comparison: CT 3/1/2021 and MRI 6/23/2020    Technique:  Axial T2-weighted, and sagittal T1, STIR, and T2-weighted  images of the lumbar spine without intravenous contrast.     Sagittal T1, STIR, and T2-weighted images of the thoracic spine  without contrast were obtained, with axial T2-weighted images through  levels of interest in the thoracic spine.     Findings:  Thoracic Spine: Exaggerated thoracic kyphosis with unchanged moderate  T7 compression deformity with superior endplate  Schmorl node and  approximately 50% height loss in the central vertebral body. No  definite abnormal signal in the thoracic spinal cord at any level.  Spinal canal and neural foramen are patent.  Alignment of the thoracic vertebra appears within normal limits. Bone  marrow signal intensity on noncontrast images appears unremarkable.  Overall no change since 6/23/2020.    Lumbar Spine:  There are 5 type lumbar vertebra, used for the purposes of this  dictation.  The tip of the conus is at L2. Grade 1 anterolisthesis of  L5 on S1, unchanged since prior MRI. Lumbar spine alignment is  otherwise preserved. Regarding the bone marrow, there is abnormal T1  hypointensity surrounding the L4-5 disc space with STIR hyperintensity  in a similar distribution. There is abnormal widening of the L4-5 disc  space and mild narrowing of the L5-S1 disc space. Since 6/23/2020, new  large Schmorl's node like indentation along inferior endplate of L4.  .  On a level by level basis, the findings are as follows:    L1-2: Mild disc bulge. Bilateral ligamentum flavum hypertrophy.  Minimal small spinal canal narrowing. Neural foramina are patent.    L2-3: Mild bilateral ligamentum flavum hypertrophy. Mild disc bulge.  Mild canal narrowing. Neural foramina are grossly patent.     L3-4: Right foraminal disc protrusion. Bilateral facet hypertrophic  changes. Bilateral ligamentum flavum hypertrophy. Right lateral recess  narrowing. Mild to moderate canal narrowing and narrowing of the right  lateral recess with likely impingement of the right L4 nerve root.  Left neural foramen is patent. Mild right neural foraminal stenosis.     L4-5: Significant disc osteophyte complex and significant bilateral  ligament flavum hypertrophy and facet hypertrophy. There is severe  canal stenosis. There is moderate left, moderate to severe right  foraminal stenoses. There is right foraminal disc protrusion. The  findings appear slightly progressed since 6/23/2020.  There is fluid  signal along the left facet joint. The fluid signal also extends  inferiorly from the facet joint posterior to the left lamina. There  are inflammatory changes around right facet joint as well. Abnormal  widened appearance of the disc space with loss of definition of the L4  inferior endplate. There is approximately 80% height loss in the  central L4 vertebral body.     L5-S1: Mild anterolisthesis. Mild to moderate right neural foraminal  narrowing. Spinal canal and left neural foramen are patent.    No abnormal paraspinous collection.   The visualized paraspinous tissues anteriorly are unremarkable.      Impression    Impression:  Thoracic spine: No acute abnormality or abnormal signal in the cord.    Lumbar spine:   1. New L4 inferior endplate compression fracture with 80% loss of  height in the central disc and mild L5 superior endplate compression  deformity. Degenerative signal changes along inferior L4 and superior  L5 endplates overall findings are felt to be due to facet degenerative  changes. If there is any clinical concern for discitis osteomyelitis  or underlying malignant process, contrast enhanced imaging can be  obtained.   2. Persistent and worsened now severe canal stenosis at L4-L5 due to  facet arthropathy changes and disc osteophyte complex.     I have personally reviewed the examination and initial interpretation  and I agree with the findings.    HERIBERTO OROZCO MD         SYSTEM ID:  [id number]   MR Lumbar Spine w/o Contrast    Narrative    Thoracic and Lumbar spine MRI without contrast 2/15/2022    History: Mid-back pain; Mid-back pain, compression fracture suspected;  Mid-back pain, neuro deficit.    Comparison: CT 3/1/2021 and MRI 6/23/2020    Technique:  Axial T2-weighted, and sagittal T1, STIR, and T2-weighted  images of the lumbar spine without intravenous contrast.     Sagittal T1, STIR, and T2-weighted images of the thoracic spine  without contrast were obtained, with axial  T2-weighted images through  levels of interest in the thoracic spine.     Findings:  Thoracic Spine: Exaggerated thoracic kyphosis with unchanged moderate  T7 compression deformity with superior endplate Schmorl node and  approximately 50% height loss in the central vertebral body. No  definite abnormal signal in the thoracic spinal cord at any level.  Spinal canal and neural foramen are patent.  Alignment of the thoracic vertebra appears within normal limits. Bone  marrow signal intensity on noncontrast images appears unremarkable.  Overall no change since 6/23/2020.    Lumbar Spine:  There are 5 type lumbar vertebra, used for the purposes of this  dictation.  The tip of the conus is at L2. Grade 1 anterolisthesis of  L5 on S1, unchanged since prior MRI. Lumbar spine alignment is  otherwise preserved. Regarding the bone marrow, there is abnormal T1  hypointensity surrounding the L4-5 disc space with STIR hyperintensity  in a similar distribution. There is abnormal widening of the L4-5 disc  space and mild narrowing of the L5-S1 disc space. Since 6/23/2020, new  large Schmorl's node like indentation along inferior endplate of L4.  .  On a level by level basis, the findings are as follows:    L1-2: Mild disc bulge. Bilateral ligamentum flavum hypertrophy.  Minimal small spinal canal narrowing. Neural foramina are patent.    L2-3: Mild bilateral ligamentum flavum hypertrophy. Mild disc bulge.  Mild canal narrowing. Neural foramina are grossly patent.     L3-4: Right foraminal disc protrusion. Bilateral facet hypertrophic  changes. Bilateral ligamentum flavum hypertrophy. Right lateral recess  narrowing. Mild to moderate canal narrowing and narrowing of the right  lateral recess with likely impingement of the right L4 nerve root.  Left neural foramen is patent. Mild right neural foraminal stenosis.     L4-5: Significant disc osteophyte complex and significant bilateral  ligament flavum hypertrophy and facet  hypertrophy. There is severe  canal stenosis. There is moderate left, moderate to severe right  foraminal stenoses. There is right foraminal disc protrusion. The  findings appear slightly progressed since 6/23/2020. There is fluid  signal along the left facet joint. The fluid signal also extends  inferiorly from the facet joint posterior to the left lamina. There  are inflammatory changes around right facet joint as well. Abnormal  widened appearance of the disc space with loss of definition of the L4  inferior endplate. There is approximately 80% height loss in the  central L4 vertebral body.     L5-S1: Mild anterolisthesis. Mild to moderate right neural foraminal  narrowing. Spinal canal and left neural foramen are patent.    No abnormal paraspinous collection.   The visualized paraspinous tissues anteriorly are unremarkable.      Impression    Impression:  Thoracic spine: No acute abnormality or abnormal signal in the cord.    Lumbar spine:   1. New L4 inferior endplate compression fracture with 80% loss of  height in the central disc and mild L5 superior endplate compression  deformity. Degenerative signal changes along inferior L4 and superior  L5 endplates overall findings are felt to be due to facet degenerative  changes. If there is any clinical concern for discitis osteomyelitis  or underlying malignant process, contrast enhanced imaging can be  obtained.   2. Persistent and worsened now severe canal stenosis at L4-L5 due to  facet arthropathy changes and disc osteophyte complex.     I have personally reviewed the examination and initial interpretation  and I agree with the findings.    HERIBERTO OROZCO MD         SYSTEM ID:  [id number]   XR Chest Port 1 View    Narrative    EXAM: XR CHEST 1 VW 2/15/2022      HISTORY: covid.    COMPARISON: CT 6/20/2020.     TECHNIQUE: Frontal view of the chest.    FINDINGS: Hyperinflated lungs with mixed hazy/streaky opacities in the  lung bases. Remaining lungs are relatively  clear without focal  consolidation. Cardiomediastinal silhouette is within normal limits.  The pulmonary vasculature is distinct. No acute abnormality of the  bony thorax and visualized upper abdomen. Unchanged convex right  thoracic scoliosis.      Impression    IMPRESSION: Mixed streaky opacities in the lung bases, likely  atelectasis. Remaining lungs are clear.    I have personally reviewed the examination and initial interpretation  and I agree with the findings.    LYLY ZELAYA MD         SYSTEM ID:  [id number]   CT Thoracic Spine w/o Contrast    Narrative    EXAM: CT THORACIC SPINE W/O CONTRAST  LOCATION: [facility]  DATE/TIME: 2/15/2022 11:57 PM    INDICATION: Back pain.  COMPARISON: MRI thoracic spine 06/22/2020  TECHNIQUE: Routine CT Thoracic Spine without IV contrast. Multiplanar reformats. Dose reduction techniques were used.     FINDINGS:  VERTEBRA:   Diffuse bony demineralization.     T1 vertebral body within normal limits.    T2 and T3 vertebral body mild chronic compression deformities similar to prior exam.    T4, T5, T6 vertebral body mild to moderate chronic compression deformities similar to prior exam.    T7 severe chronic compression deformity similar to prior exam.    T8 and T9 mild to moderate chronic compression deformities similar to prior exam.    T10, T11, and T12 mild chronic compression deformities similar to prior examination.    No acute fracture. No acute post traumatic subluxation.    CANAL/FORAMINA: Mild exaggerated thoracic kyphosis. T6-T7 mild grade 1 anterolisthesis measuring 1 to 2 mm. No additional subluxations. Multilevel spondylosis result in various levels and degrees of central canal stenosis and neural foraminal stenosis.   No critical central canal stenosis. Please defer evaluation of the spondylosis to the MRI thoracic spine 02/15/2022.    PARASPINAL: Vascular calcifications. Mild hiatal hernia. Midesophagus fluid suggesting  reflux and/or dysmotility.      Impression    IMPRESSION:  1.  Diffuse bony demineralization.   2.  Thoracic spine demonstrates multiple chronic compression deformities most severe at T7 described above.  3.  Multilevel spondylosis described above.     Lumbar spine CT w/o contrast    Addendum: 2/16/2022    L5-S1 grade 1 anterolisthesis measuring 4 to 5 mm secondary to spondylosis redemonstrated from   MRI lumbar spine 02/15/2022.     L4-L5 high-grade severe central canal stenosis redemonstrated from MRI lumbar spine 02/15/2022.        Narrative    EXAM: CT LUMBAR SPINE W/O CONTRAST  LOCATION: [facility]  DATE/TIME: 2/15/2022 11:57 PM    INDICATION: Back pain  COMPARISON: MRI lumbar spine 06/22/2020, MRI lumbar spine 02/15/2022  TECHNIQUE: Routine CT Lumbar Spine without IV contrast. Multiplanar reformats. Dose reduction techniques were used.     FINDINGS:  VERTEBRA:   Diffuse bony demineralization.     L4 inferior endplate large central concavity resulting in severe central vertebral body compression deformity. Findings redemonstrated from recent prior examination. There is mild bone marrow edema adjacent to the compression deformity on MRI 02/15/2022,   which could reflect chronic fracture with degenerative type I Modic changes or acute/subacute fracture.      L5 vertebral body mild to moderate compression deformity. The superior endplate and mid vertebral body demonstrates bone marrow edema, which could reflect chronic fracture with pronounced degenerative type I Modic changes or acute to subacute fracture.    Remaining lumbar vertebral body heights are within normal limits. Otherwise, no acute fracture.    CANAL/FORAMINA: Multilevel spondylosis result in various levels and degrees of central canal stenosis and neural foraminal stenosis. L4-L5 high-grade severe canal stenosis. Please defer characterization of the spondylosis and the effect on the thecal sac   and  neural foramina to the MRI lumbar spine 02/15/2022.    PARASPINAL: Left kidney midpole small cyst. No specific follow-up recommended. Vascular calcifications.      Impression    IMPRESSION:  1.  L4 inferior endplate large central concavity resulting in severe central vertebral body compression deformity. Findings redemonstrated from recent prior examination. There is mild bone marrow edema adjacent to the compression deformity on MRI   02/15/2022, which could reflect chronic fracture with degenerative type I Modic changes or acute/subacute fracture.      2.  L5 vertebral body mild to moderate compression deformity. The superior endplate and mid vertebral body demonstrates bone marrow edema, which could reflect chronic fracture with pronounced degenerative type I Modic changes or acute to subacute   fracture.    3.  Otherwise, no acute fracture.    4.  Diffuse bony demineralization.    5.  Multilevel spondylosis.    6.  L4-L5 severe high-grade severe central canal stenosis.     MR Lumbar Spine w Contrast    Narrative    EXAM: MR LUMBAR SPINE W CONTRAST  LOCATION: [facility]  DATE/TIME: 2/16/2022 12:14 AM    INDICATION: Back pain  COMPARISON: None.  CONTRAST: 7.5mL Gadavist  TECHNIQUE: Routine Lumbar Spine MRI with IV contrast.    FINDINGS:   Nomenclature is based on 5 lumbar type vertebral bodies.     L4 inferior endplate large central concavity resulting in severe central vertebral body compression deformity. The inferior endplate demonstrates mild enhancement corresponding to mild increased STIR signal on MRI lumbar spine 02/15/2022 likely due to   mild bone marrow edema. Findings may reflect chronic fracture with degenerative type I Modic changes or acute/subacute fracture.      L5 vertebral body mild to moderate compression deformity. The superior endplate and mid vertebral body demonstrates pronounced enhancement corresponding to increased STIR signal on MRI lumbar  spine 02/15/2022 likely due to pronounced bone marrow edema.   Findings may reflect chronic fracture with pronounced degenerative type I Modic changes or acute to subacute fracture.    Remaining lumbar vertebral body heights are within normal limits.     Multilevel spondylosis result in various levels and degrees of central canal stenosis and neural foraminal stenosis. L4-L5 high-grade severe canal stenosis. Please defer characterization of the spondylosis and the effect on the thecal sac and neural   foramina to the MRI lumbar spine 02/15/2022. L4-L5 grade 1 anterolisthesis.    No intradural pathologic enhancement. Anterior epidural space demonstrates engorged venous plexus throughout. No concerning enhancement within the spinal canal.    L4-L5 interspinous bursitis with enhancement        Impression    IMPRESSION:  1.  L4 inferior endplate large central concavity resulting in severe central vertebral body compression deformity. The inferior endplate demonstrates mild enhancement corresponding to mild increased STIR signal on MRI lumbar spine 02/15/2022 likely due   to mild bone marrow edema. Findings may reflect chronic fracture with degenerative type I Modic changes or acute/subacute fracture.      2.  L5 vertebral body mild to moderate compression deformity. The superior endplate and mid vertebral body demonstrates pronounced enhancement corresponding to increased STIR signal on MRI lumbar spine 02/15/2022 likely due to pronounced bone marrow   edema. Findings may reflect chronic fracture with pronounced degenerative type I Modic changes or acute to subacute fracture.    3.  L4-L5 discitis osteomyelitis less unlikely given no significant increased T2/STIR signal (fluid signal) within the L4-L5 disc space on noncontrast MRI lumbar spine 02/15/2022. Morphology makes metastatic disease unlikely.     4.  Multilevel spondylosis. L5-S1 grade 1 anterolisthesis.    5.  L4-L5 severe high-grade thecal sac stenosis  redemonstrated from noncontrast MR lumbar spine 02/15/2022.    6.  L4-L5 interspinous bursitis.     7.  No concerning enhancement within the spinal canal.

## 2022-02-17 NOTE — TELEPHONE ENCOUNTER
RECORDS RECEIVED FROM: Trae or [place] PER BRISEIDA/Mariely Gould PA-C AT [facility]  Bilateral low back pain with bilateral sciatica/ Closed compression fracture of L4 lumbar vertebra/ Dr Benton/ MRI and CT and XR/ [facility]/ ortho con   DATE RECEIVED: Feb 23, 2022     NOTES STATUS DETAILS   OFFICE NOTE from referring provider Internal  Mariely Gould PA-C     DISCHARGE REPORT from the ER Internal  Mike Capps DO     MEDICATION LIST Internal    MRI Internal 2/15/22  MORE INTERNAL   CT SCAN Internal 2/15/22  MORE INTERNAL   XRAYS (IMAGES & REPORTS) Internal 2/17/22  MORE INTERNAL

## 2022-02-17 NOTE — PROGRESS NOTES
Resident/Fellow Attestation   I, Art Carter, was present with the medical/DAVID student who participated in the service and in the documentation of the note.  I have verified the history and personally performed the physical exam and medical decision making.  I agree with the assessment and plan of care as documented in the note.      Art Carter MD  PGY1  Date of Service (when I saw the patient): 02/17/22    [facility]    Progress Note - Medicine Service, MAROON TEAM 2       Date of Admission:  2/15/2022    Assessment & Plan        Junaid Gunter is a 73 YO Danish-speaking F with a h/o chronic hepatitis C, peripheral neuropathy, osteoporosis, hypothyroidism, hypertension, chronic thrombocytopenia and degenerative disc disease who was admitted on 2/15/2022 with poor ambulation, LR compression fracture, and incidentally found to be COVID-19 positive.    Changes today:  - PT/OT consult  - ID curbside: reasonable to administer 3 day course of Remdesivir (200mg x 1, followed by 100mg/d x 2 days IV) if staying inpatient in the coming days  - Discharge planning pending PT/OT recs    # L4 endplate compression fracture with radiculopathy  # Chronic T7 compression fracture  Hx T7 compression fracture 6/2020. Last seen by neurosurgery outpatient 3/2021 at which time imaging demonstrated stable appearance of chronic T7 fracture with multilevel lumbar spondylosis and severe stenosis of L4-L5. On arrival to ED 2/15/22, patient reported worsening back and radicular pain in addition to inability to ambulate x1 week. XR lumbar spine, MRI lumbar/thoracic spine, and CT lumbar/thoracic spine checked on admission per NS recs. Imaging showed a new L4 inferior endplate compression fracture with persistent and worsened severe canal stenosis L4-L5.  - Neurosurgery consulted:   > Recommend TSLO brace when out of bed; this was fitted 2/16/22   > Obtain lumbar AP/lateral upright x-rays with  brace; completed 2/16/22   > Plan to f/u OP if symptoms persist to discuss potential surgical intervention; no neurosurgical intervention warranted at this time  - PT/OT consulted, pending dispo rec's  - Pain control   > Tylenol PRN   > oxycodone 2.5 mg q4h PRN for breakthrough pain   > PTA gabapentin 300 mg BID    # Incidentally COVID positive, asymptomatic  Asymptomatic; denies any SOB, cough, loss of taste/smell. Doing well on RA. Received COVID-19 moderna immunizations 3/17/21 and 4/14/21. Has not received booster. D dimer 1.04 on 2/16/22.  - DVT ppx: Heparin SQ  - Patient was not taking any COVID prophylactic medications on admission   > Per ID: reasonable to administer 3 day course of Remdesivir (200mg x 1, followed by 100mg/d x 2 days IV) given patient's age and comorbid conditions; will consider this pending PT/OT consults & discharge planning   - Offer COVID-19 booster on discharge or OP f/u    # Osteoporosis   Previously on alendronate in 6/2020, unclear when discontinued.  - Plan for PCP f/u on discharge for consideration of further bisphosphonate therapy    # Increased creatinine, suspect pre-renal d/t hypovolemia  2/17/22 creatinine 1.06 (0.91 on 2/15/22, 1/07 on 2/16/22).  - BMP daily to monitor for STACY  - 1L LR bolus 2/16/22, encourage PO intake  - Will hold gabapentin and losartan if evidence of STACY on subsequent BMP    Stable / Chronic Problems  ================================================  # Chronic thrombocytopenia  2/17/22 platelet count 135.  - CBC daily    # Chronic anemia  2/17/22 10.0. Baseline appears to be 9-10.  - CBC daily    # Hypertension  Per 3/10/20 OP visit: Patient was previously on Losartan/HCTZ and amlodipine but currently only on Losartan 100 mg daily.  - PTA losartan 100 mg daily    # Hypothyroidism  Per 3/10/20 OP visit: Patient was initially taking LT 25 mcg but she took only for 8 days and stopped it 2/2020 as she felt that it caused edema.  - PTA dessicated thyroid  tablet 15 mg daily    # Chronic hepatitis C  Per 3/10/20 OP visit: Patient saw [facility] and completed required testing. Patient was advised to take the medication Mavyret but she had difficulty getting it due to language barrier. Unclear if treatment was completed.  - F/u OP      Diet: Regular Diet Adult    DVT Prophylaxis: Heparin subcutaneous, Pneumatic Compression Devices  Tong Catheter: Not present  Fluids: None  Central Lines: None  Cardiac Monitoring: None  Code Status: Full Code      Disposition Plan   Expected Discharge: Dependent on PT/OT evals today   Anticipated discharge location:  TCU vs home with home care     The patient's care was discussed with the Attending Physician, Dr. Sdaa Hale.    Kate Morales, MS3  Medicine Service, [facility] TEAM [address] [place] [address]  Securely message with the Vocera Web Console (learn more here)  Text page via [employer] [facility] Paging/Directory   Please see signed in provider for up to date coverage information  ______________________________________________________________________    Interval History   No acute events overnight. Pain has been improving but she notes significant weakness. Reports that weakness began as a result of her pain and not being able to walk. Patient is otherwise feeling well. Denies any SOB.    Data reviewed today: I reviewed all medications, new labs and imaging results over the last 24 hours.     Physical Exam   Vital Signs: Temp: (!) 96.4  F (35.8  C) Temp src: Oral BP: (!) 146/59 Pulse: 84   Resp: 16 SpO2: 93 % O2 Device: None (Room air)    Weight: 176 lbs 9.6 oz  General: Awake and engaged in conversation. NAD.  Respiratory: Nonlabored breathing on RA. Speaking in complete sentences.  Cardiovascular: Skin warm and well perfused.  Musculoskeletal: No edema. Able to move extremities.  Neurologic: Alert. Responding to questions appropriately.  Psych: Appropriate mood and affect.    Data   Recent Labs   Lab  02/17/22  0745 02/16/22  0734 02/15/22  1353   WBC 5.3 4.5 3.4*   HGB 10.0* 11.1* 11.5*   * 101* 100   * 151 155    134 139   POTASSIUM 4.5 4.8 4.1   CHLORIDE 102 102 106   CO2 25 27 29   BUN 25 16 11   CR 1.06* 1.07* 0.91   ANIONGAP 7 5 4   TACHO 8.6 9.3 9.6   GLC 91 108* 90   ALBUMIN  --   --  3.6   PROTTOTAL  --   --  8.3   BILITOTAL  --   --  0.3   ALKPHOS  --   --  132   ALT  --   --  21   AST  --   --  22     Recent Results (from the past 24 hour(s))   XR Lumbar Spine 2/3 Views    Narrative    EXAM: XR LUMBAR SPINE 2-3 VIEWS  LOCATION: [facility]  DATE/TIME: 2/17/2022 2:25 AM    INDICATION: New L4 endplate compression fracture.  COMPARISON: CT dated 02/15/2022 . MRI brain dated 02/15/2022.  TECHNIQUE: CR Lumbar Spine.      Impression    IMPRESSION: Diminished osseous mineralization and patient body habitus markedly degrade evaluation of the osseous structures. Stable vertebral body heights. Endplate sclerosis is noted at L4 inferiorly and L5 superiorly. No definite new compression   fracture deformities identified. Apparent grade 1 retrolisthesis at the levels of T12-L1, L1-L2 and L2-L3 is favored to be related to patient positioning. Otherwise stable alignment. No definite acute extraspinal abnormality identified.

## 2022-02-17 NOTE — PROGRESS NOTES
"Care Management Follow Up    Length of Stay (days): 1    Expected Discharge Date: 02/18/2022     Concerns to be Addressed: Discharge planning    Patient plan of care discussed at interdisciplinary rounds: Yes    Anticipated Discharge Disposition:  (TBD)     Anticipated Discharge Services:  (TBD)  Anticipated Discharge DME:  (TBD)    Patient/family educated on Medicare website which has current facility and service quality ratings:  Not at this time  Education Provided on the Discharge Plan:  Plan TBD  Patient/Family in Agreement with the Plan: Plan TBD      Referrals Placed by CM/SW:  None at this time  Private pay costs discussed: Not applicable    Additional Information:  Pt is a 72 year old Australian speaking female \" with PMHx chronic hepatitis C, peripheral neuropathy, osteoporosis, hypothyroidism, hypertension, thrombocytopenia, degenerative disc disease, who is admitted on 2/15/2022 due to lumbar radiculopathy and difficulty ambulating, found to have a new L4 endplate compression fracture.\"    Pt is medically ready for discharge pending PT/OT evals. Received update from PT and OT that TCU is recommended.     Due to pt being Covid+ there is only one TCU facility option- [facility] in [place] ([facility]    SW contacted [facility] liaison Blanca (p. [phone], f. [phone], has access to Epic) to inquire about bed availability at [facility] and she reported no bed availability this week and asked that SW check back on Monday.     LASHAY Rendon, [address]   [phone] (pager) [phone]  2/17/2022          "

## 2022-02-17 NOTE — PROGRESS NOTES
"   02/17/22 1053   Quick Adds   Quick Adds Certification   Type of Visit Initial PT Evaluation       Present yes   Language South Korean   Living Environment   People in Home alone   Current Living Arrangements apartment   Home Accessibility other (see comments)  (elevator access)   Transportation Anticipated family or friend will provide   Living Environment Comments Pt lives alone in apartment on the 9th floor. Pt reports elevator access, no stairs. Pt states that her niece checks on her often, but is not able to provide 24 hour assist. Pt has walk in shower with shower chair and grab bars. Pt also own 4WW that she uses for mobility.   Self-Care   Usual Activity Tolerance moderate   Current Activity Tolerance fair   Regular Exercise No   Equipment Currently Used at Home grab bar, tub/shower;shower chair  (4WW)   Fall history within last six months no   Activity/Exercise/Self-Care Comment Pt reports at baseline, she is independent with ADL's and IADL's. Mod I for mobility with 4WW. However, over the past week with incresaed pain she has been requiring assistance and has done limited walking.    General Information   Onset of Illness/Injury or Date of Surgery 02/15/22   Referring Physician Lena Elias MD   Patient/Family Therapy Goals Statement (PT) to return home   Pertinent History of Current Problem (include personal factors and/or comorbidities that impact the POC) Per EMR: \"Junaid Gunter is a 73 YO South Korean-speaking F with a h/o chronic hepatitis C, peripheral neuropathy, osteoporosis, hypothyroidism, hypertension, chronic thrombocytopenia and degenerative disc disease who was admitted on 2/15/2022 with poor ambulation, LR compression fracture, and incidentally found to be COVID-19 positive.\"   Existing Precautions/Restrictions fall;brace worn when out of bed;spinal   General Observations Activity: Up Ad Jen; TLSO when OOB   Cognition   Affect/Mental Status (Cognition) WFL   Orientation " Status (Cognition) oriented x 4   Safety Deficit (Cognition) impulsivity   Cognitive Status Comments Pt demos mild impulsivity during session with mobility.    Pain Assessment   Patient Currently in Pain Yes, see Vital Sign flowsheet  (reports back pain radiating down into hips and legs)   Integumentary/Edema   Integumentary/Edema no deficits were identifed   Posture    Posture Forward head position;Protracted shoulders   Range of Motion (ROM)   ROM Comment B LE grossly WFL   Strength (Manual Muscle Testing)   Strength Comments B LE functional weakness observed with functional trasnfers/mobility   Bed Mobility   Comment, (Bed Mobility) Sup <> sit with Cedrick; cues for log roll technique   Transfers   Comment, (Transfers) sit <> stand with CGA with FWW   Gait/Stairs (Locomotion)   Comment, (Gait/Stairs) Pt ambulates with FWW and Cedrick. Pt demos slowed gait speed, flexed forward posture, short step lengths bilaterally, and observable leg weakness. Pt reports increased pain with OOB mobility/gait.    Balance   Balance Comments Pt demos good seated balance at EOB, able to maintain sitting with SBA-mod I. Pt requires use of FWW and CGA-Cedrick for stability with standing and gait   Sensory Examination   Sensory Perception Comments Pt reports numbness/tingling in B LEs, though light touch sensation intact.    Coordination   Coordination no deficits were identified   Clinical Impression   Criteria for Skilled Therapeutic Intervention Yes, treatment indicated   PT Diagnosis (PT) impaired functional mobility   Influenced by the following impairments pain, weakness, decreased activity tolerance, impaired balance   Functional limitations due to impairments bed mobility, transfers, gait   Clinical Presentation (PT Evaluation Complexity) Stable/Uncomplicated   Clinical Presentation Rationale clinical judgement   Clinical Decision Making (Complexity) low complexity   Planned Therapy Interventions (PT) balance training;bed mobility  training;gait training;home exercise program;neuromuscular re-education;patient/family education;ROM (range of motion);stair training;strengthening;transfer training   Anticipated Equipment Needs at Discharge (PT) walker, rolling   Risk & Benefits of therapy have been explained evaluation/treatment results reviewed;care plan/treatment goals reviewed   Clinical Impression Comments Pt presents below functional mobility baseline, function greatly limited secondary to pain. Pt to benefit from skilled inpatient PT in order to address the above impairments and progress towards PLOF   PT Discharge Planning   PT Discharge Recommendation (DC Rec) Transitional Care Facility;home with assist;home with home care physical therapy   PT Rationale for DC Rec Pt currently below functional mobility baseline, activity tolerance greatly limited secondary to pain. TCU recommended for progression of safety and independence with functional mobility prior to return to home alone. If pt were to d/c home, would recommend 24 hour assist, FWW, and home therapies.    PT Brief overview of current status Ax1 FWW and gait belt   Plan of Care Review   Plan of Care Reviewed With patient   Therapy Certification   Start of care date 02/17/22   Certification date from 02/17/22   Certification date to 03/03/22   Medical Diagnosis weakness   Total Evaluation Time   Total Evaluation Time (Minutes) 10   Physical Therapy Goals   PT Frequency 5x/week   PT Predicated Duration/Target Date for Goal Attainment 03/03/22   PT Goals Bed Mobility;Transfers;Gait;PT Goal 1   PT: Bed Mobility Modified independent;Supine to/from sit;Within precautions   PT: Transfers Modified independent;Sit to/from stand;Assistive device   PT: Gait Modified independent;Rolling walker;Within precautions;150 feet   PT: Goal 1 Pt will independently don/doff TLSO and demonstrate understanding of spinal precuations for protection of spinal compression fracture.

## 2022-02-17 NOTE — PLAN OF CARE
[facility]      OUTPATIENT PHYSICAL THERAPY EVALUATION  PLAN OF TREATMENT FOR OUTPATIENT REHABILITATION  (COMPLETE FOR INITIAL CLAIMS ONLY)  Patient's Last Name, First Name, M.I.  YOB: 1950  Junaid Gunter                        Provider's Name  [facility] Medical Record No.  [id number]                               Onset Date:  02/15/22   Start of Care Date:  02/17/22      Type:     _X_PT   ___OT   ___SLP Medical Diagnosis:  weakness                        PT Diagnosis:  impaired functional mobility   Visits from SOC:  1   _________________________________________________________________________________  Plan of Treatment/Functional Goals    Planned Interventions: balance training, bed mobility training, gait training, home exercise program, neuromuscular re-education, patient/family education, ROM (range of motion), stair training, strengthening, transfer training     Goals: See Physical Therapy Goals on Care Plan in [employer] electronic health record.    Therapy Frequency: 5x/week  Predicted Duration of Therapy Intervention: 03/03/22  _________________________________________________________________________________    I CERTIFY THE NEED FOR THESE SERVICES FURNISHED UNDER        THIS PLAN OF TREATMENT AND WHILE UNDER MY CARE     (Physician co-signature of this document indicates review and certification of the therapy plan).              Certification date from: 02/17/22, Certification date to: 03/03/22    Referring Physician: Lena Elias MD            Initial Assessment        See Physical Therapy evaluation dated 02/17/22 in Epic electronic health record.

## 2022-02-17 NOTE — PROGRESS NOTES
Infectious Disease Curbside Note  I was called by Dr. Carter on 2/17/2022 at 9:59 AM to provide input for Junaid Gunter MRN [id number]. The patient is located at [facility].. The nature of this request for a cristela consultation does not permit me to perform a comprehensive review of health care records, patient/family interview, nor an examination of the patient. I obtained limited patient information from the provider on the phone call and a limited chart review.    Based on only the information I was provided today, I make the following recommendations to the treating provider/team for their review and consideration:     73 y/o lady with chronic hepatitis C, peripheral neuropathy, osteoporosis, hypothyroidism, hypertension, chronic thrombocytopenia and degenerative disc disease who was admitted on 2/15/2022 with poor ambulation, LR compression fracture, and incidentally found to be COVID-19 positive    Patient is vaxxed x 2, no booster. Asymptomatic. Test on admission positive. Not on supplemental O2  No indication for Dexamethasone. However as she is at high risk for progression given age and comorbid conditions. According to [place] trial, reasonable to administer 3 day course of Remdesivir (200mg x 1, followed by 100mg/d x 2 days IV)     These recommendations are not intended to take the place of the care team's clinical judgement, which should always be utilized to provide the most appropriate care to meet the unique needs of each patient. The recommendations offered were based on the limited scope of information provided as today's date. Should additional guidance be needed or required a formal consultation with infectious diseases is recommended.

## 2022-02-17 NOTE — PLAN OF CARE
PRIOR TO DISCHARGE        -diagnostic tests and consults completed and resulted PARTIALLY MET, xray completed   -vital signs normal or at patient baseline MET  -adequate pain control on oral analgesics MET     Nurse to notify provider when observation goals have been met and patient is ready for discharge.

## 2022-02-17 NOTE — PLAN OF CARE
PRIOR TO DISCHARGE        -diagnostic tests and consults completed and resulted NOT MET  -vital signs normal or at patient baseline MET  -adequate pain control on oral analgesics MET     Nurse to notify provider when observation goals have been met and patient is ready for discharge.

## 2022-02-17 NOTE — PLAN OF CARE
/58 (BP Location: Left arm, Cuff Size: Adult Regular)   Pulse 82   Temp (!) 95.9  F (35.5  C) (Oral)   Resp 18   Wt 80.1 kg (176 lb 9.6 oz)   SpO2 95%   BMI 35.67 kg/m         Shift: 0593-8693     Status: COVID + asymptomatic, impaired mobility and ADLs   Neuro: A&O x4. Tunisian speaking. Bed alarm on for safety. Pt got OOB numerous times today with reminders to call for help before getting OOB   Respiratory: WDL on RA sating > 90. Denies SOB   Cardiac: WDL, denies cardiac chest pain  GI/: Voids w/out difficulty via BSC. LBM 2/15/22  Diet: Regular, tolerating well. Needs assistance w/ ordering   Pain: Bilateral leg pain/neuropathy managed w/ PRN oxy q 4h   Incision/Drains: none   IV Access: R PIV SL  Labs: RBC 3.11, Hgb 10, hematocrit 31.6  Activity: Assist x1 w/ walker. Remind pt to call for help before getting OOB.      New changes this shift: PT consult completed today, recommends rehab/therapy.     Plan: Continue w/ POC

## 2022-02-18 ENCOUNTER — APPOINTMENT (OUTPATIENT)
Dept: PHYSICAL THERAPY | Facility: CLINIC | Age: 72
End: 2022-02-18
Payer: COMMERCIAL

## 2022-02-18 LAB
ANION GAP SERPL CALCULATED.3IONS-SCNC: 3 MMOL/L (ref 3–14)
BUN SERPL-MCNC: 20 MG/DL (ref 7–30)
CALCIUM SERPL-MCNC: 9 MG/DL (ref 8.5–10.1)
CHLORIDE BLD-SCNC: 108 MMOL/L (ref 94–109)
CO2 SERPL-SCNC: 26 MMOL/L (ref 20–32)
CREAT SERPL-MCNC: 0.93 MG/DL (ref 0.52–1.04)
ERYTHROCYTE [DISTWIDTH] IN BLOOD BY AUTOMATED COUNT: 14.7 % (ref 10–15)
GFR SERPL CREATININE-BSD FRML MDRD: 65 ML/MIN/1.73M2
GLUCOSE BLD-MCNC: 89 MG/DL (ref 70–99)
HCT VFR BLD AUTO: 30.4 % (ref 35–47)
HGB BLD-MCNC: 9.7 G/DL (ref 11.7–15.7)
MCH RBC QN AUTO: 31.9 PG (ref 26.5–33)
MCHC RBC AUTO-ENTMCNC: 31.9 G/DL (ref 31.5–36.5)
MCV RBC AUTO: 100 FL (ref 78–100)
PLATELET # BLD AUTO: 132 10E3/UL (ref 150–450)
POTASSIUM BLD-SCNC: 4.4 MMOL/L (ref 3.4–5.3)
RBC # BLD AUTO: 3.04 10E6/UL (ref 3.8–5.2)
SODIUM SERPL-SCNC: 137 MMOL/L (ref 133–144)
WBC # BLD AUTO: 4.6 10E3/UL (ref 4–11)

## 2022-02-18 PROCEDURE — 96376 TX/PRO/DX INJ SAME DRUG ADON: CPT

## 2022-02-18 PROCEDURE — 97116 GAIT TRAINING THERAPY: CPT | Mod: GP | Performed by: PHYSICAL THERAPIST

## 2022-02-18 PROCEDURE — 250N000013 HC RX MED GY IP 250 OP 250 PS 637

## 2022-02-18 PROCEDURE — 250N000011 HC RX IP 250 OP 636

## 2022-02-18 PROCEDURE — 36415 COLL VENOUS BLD VENIPUNCTURE: CPT

## 2022-02-18 PROCEDURE — 82310 ASSAY OF CALCIUM: CPT

## 2022-02-18 PROCEDURE — 250N000013 HC RX MED GY IP 250 OP 250 PS 637: Performed by: STUDENT IN AN ORGANIZED HEALTH CARE EDUCATION/TRAINING PROGRAM

## 2022-02-18 PROCEDURE — 85027 COMPLETE CBC AUTOMATED: CPT

## 2022-02-18 PROCEDURE — 99207 PR CDG-CODE CATEGORY CHANGED: CPT | Performed by: INTERNAL MEDICINE

## 2022-02-18 PROCEDURE — 258N000003 HC RX IP 258 OP 636

## 2022-02-18 PROCEDURE — G0378 HOSPITAL OBSERVATION PER HR: HCPCS

## 2022-02-18 PROCEDURE — 97530 THERAPEUTIC ACTIVITIES: CPT | Mod: GP | Performed by: PHYSICAL THERAPIST

## 2022-02-18 PROCEDURE — 250N000009 HC RX 250

## 2022-02-18 PROCEDURE — 99225 PR SUBSEQUENT OBSERVATION CARE,LEVEL II: CPT | Mod: GC | Performed by: INTERNAL MEDICINE

## 2022-02-18 PROCEDURE — 96372 THER/PROPH/DIAG INJ SC/IM: CPT

## 2022-02-18 RX ADMIN — SODIUM CHLORIDE 50 ML: 9 INJECTION, SOLUTION INTRAVENOUS at 20:43

## 2022-02-18 RX ADMIN — HEPARIN SODIUM 5000 UNITS: 5000 INJECTION, SOLUTION INTRAVENOUS; SUBCUTANEOUS at 06:09

## 2022-02-18 RX ADMIN — LOSARTAN POTASSIUM 100 MG: 100 TABLET, FILM COATED ORAL at 09:04

## 2022-02-18 RX ADMIN — OXYCODONE HYDROCHLORIDE 2.5 MG: 5 TABLET ORAL at 13:47

## 2022-02-18 RX ADMIN — HEPARIN SODIUM 5000 UNITS: 5000 INJECTION, SOLUTION INTRAVENOUS; SUBCUTANEOUS at 17:37

## 2022-02-18 RX ADMIN — GABAPENTIN 300 MG: 300 CAPSULE ORAL at 09:04

## 2022-02-18 RX ADMIN — GABAPENTIN 300 MG: 300 CAPSULE ORAL at 20:38

## 2022-02-18 RX ADMIN — OXYCODONE HYDROCHLORIDE 2.5 MG: 5 TABLET ORAL at 22:17

## 2022-02-18 RX ADMIN — REMDESIVIR 100 MG: 100 INJECTION, POWDER, LYOPHILIZED, FOR SOLUTION INTRAVENOUS at 20:37

## 2022-02-18 RX ADMIN — ASPIRIN 81 MG: 81 TABLET, COATED ORAL at 09:04

## 2022-02-18 RX ADMIN — THYROID, PORCINE 15 MG: 15 TABLET ORAL at 09:04

## 2022-02-18 NOTE — PROGRESS NOTES
Care Management Follow Up    Length of Stay (days): 1    Expected Discharge Date: 02/21/2022     Concerns to be Addressed: discharge planning       Patient plan of care discussed at interdisciplinary rounds: Yes    Anticipated Discharge Disposition:Home with family     Anticipated Discharge Services: Home care vs OP therapy  Anticipated Discharge DME: TLSO brace    Patient/family educated on Medicare website which has current facility and service quality ratings: NA   Education Provided on the Discharge Plan:  Not able to reach patient   Patient/Family in Agreement with the Plan: not able to reach patient     Referrals Placed by CM/SW: no referrals placed this date   Private pay costs discussed: Not applicable    Additional Information:  Per PT patient declining TCU.  They recommend 24/7 assist assist and home care if discharging to home.  Attempted to contact patient with  via phone to complete CMA.  Patient hung up on this writer x 2.  Attempted to call patient Radha carroll, and got her vm.  Pt's insurance [employer] PM[employer] and staffing will severely limit her options for home care see below:    [facility]-Ph: [phone]-not accepting [place]  [facility]-Ph: [phone]-unable to accept due to capacity  [facility]-Ph: [phone]-at capacity due to staffing  [facility]-Ph: [phone]-not accepting new referrals at this time  [facility]-Ph: [phone]-at capacity for pt's insurance  [facility]-Ph: [phone]-not accepting [place]  [facility]-Ph: [phone]-not accepting referrals due to capacity  [facility]-Ph: [phone]-not accepting pt's insurance  [facility]-Ph: [phone]-only taking referrals from [facility]  [facility]-Ph: [phone]-  [facility]-Ph: [phone]-not accepting [place]  [facility]-Ph: [phone]-not able to accept referral due to staffing  [facility]-Ph:  [phone]-not accepting new referrals due to staffing  [facility]-Ph: [phone]-do not service pt's in the community  [facility]-Ph:[phone]-no staffing available  [facility]-Ph: [phone]-no staffing available   [facility]-Ph: [phone]-[facility]-Ph: [phone]-not accepting new referrals due to staffing  [facility]-Ph: [phone]-not accepting new referrals due to staffing  [facility]-Ph: [phone]-not accepting [place]  [facility]-Ph: [phone]-not accepting [place]  [facility]-Ph: [phone]-not accepting [place]  [facility]-Ph: [phone] do not accept insurance  [facility]-Ph: [phone]-vm left  [facility]-Ph: [phone]-only accepting [facility] patients  [facility]-Ph: [phone]-left vm  [facility]-Ph: [phone], Fax: [phone], don't accept insurance  [facility]-Ph: [phone]-not accepting [place]  [facility]-Ph: [phone]-capped for [place] pt's at this time    Unlikely to be home care options for patient.  If able to get to appointment OP PT/OT would be an option.  Will need to verify with family that they can provide 24/7 assist.      RNCC will continue to follow and assist with discharge planning.      ADDENDUM 1700: Spoke with granddaughter, Radha, by phone.  She reported that family can provide 24/7 assist if needed.  She would be willing to learn how to assist with donning and doffing the brace.  Updated bedside nurse and they will work with PT to try and coordinate this care giver education.  Radha would like her grandma to have home care.  Explained that it may not be an option due to staffing/insurances issues.  She would like it explored.  Explained that OP PT/OT would be the back up plan.  RNCC will continue to follow.          CAIN Balderas  Phone: [phone]  Pager: [phone]  To  contact the weekend RNCC, Page: [phone]

## 2022-02-18 NOTE — PROGRESS NOTES
Resident/Fellow Attestation   I, Sal Tatum Jr., was present with the medical/DAVID student who participated in the service and in the documentation of the note.  I have verified the history and personally performed the physical exam and medical decision making.  I agree with the assessment and plan of care as documented in the note.      Sal Tatum Jr., MD  PGY3  Date of Service (when I saw the patient): 02/18/22    [facility]    Progress Note - Medicine Service, [facility] TEAM 2       Date of Admission:  2/15/2022    Assessment & Plan        Junaid Gunter is a 71 YO [place]er-speaking F with a h/o chronic hepatitis C, peripheral neuropathy, osteoporosis, hypothyroidism, hypertension, chronic thrombocytopenia and degenerative disc disease who was admitted on 2/15/2022 with poor ambulation, LR compression fracture, and incidentally found to be COVID-19 positive.    Changes today:  - Pending dispo  - Ctn IV Remdesivir while inpatient (3 day course)  - Med student updated patient family in AM  - Resident attempted to call family in afternoon to discuss appropriate dispo but no answer    # L4 endplate compression fracture with radiculopathy  # Chronic T7 compression fracture  Hx T7 compression fracture 6/2020. Last seen by neurosurgery outpatient 3/2021 at which time imaging demonstrated stable appearance of chronic T7 fracture with multilevel lumbar spondylosis and severe stenosis of L4-L5. On arrival to ED 2/15/22, patient reported worsening back and radicular pain in addition to inability to ambulate x1 week. XR lumbar spine, MRI lumbar/thoracic spine, and CT lumbar/thoracic spine checked on admission per NS recs. Imaging showed a new L4 inferior endplate compression fracture with persistent and worsened severe canal stenosis L4-L5.  - Neurosurgery consulted:   > TSLO brace   > S/p XR's with brace, no NS intervention planned   > Plan to f/u OP if symptoms  persist to discuss potential surgical intervention; no neurosurgical intervention warranted at this time  - PT/OT consult 2/17/22: recommend TCU  - Pain control   > Tylenol PRN   > oxycodone 2.5 mg q4h PRN for breakthrough pain   > PTA gabapentin 300 mg BID    # Incidentally COVID positive, asymptomatic  Asymptomatic; denies any SOB, cough, loss of taste/smell. Doing well on RA. Received COVID-19 moderna immunizations 3/17/21 and 4/14/21. Has not received booster. D dimer 1.04 on 2/16/22.  - DVT ppx: Heparin SQ  - Patient was not taking any COVID prophylactic medications on admission   > Per ID: reasonable to administer 3 day course of Remdesivir (200mg x 1, followed by 100mg/d x 2 days IV) given patient's age and comorbid conditions; started 2/17/22, will complete course 2/19/22 assuming patient remains inpatient  - Offer COVID-19 booster on discharge or OP f/u    # Osteoporosis   Previously on alendronate in 6/2020, unclear when discontinued.  - Plan for PCP f/u on discharge for consideration of further bisphosphonate therapy    # Increased creatinine, suspect pre-renal d/t hypovolemia - resolved  2/18/22 creatinine 0/93 (0.91 on 2/15/22, 1/07 on 2/16/22).  - 1L LR bolus 2/16/22  - Encourage PO intake  - Will CTM creatinine given Remdesivir administration    Stable / Chronic Problems  ================================================    # Chronic thrombocytopenia  2/18/22 platelet count 132.    # Chronic anemia  2/18/22 9.7. Baseline appears to be 9-10.    # Hypertension  Per 3/10/20 OP visit: Patient was previously on Losartan/HCTZ and amlodipine but currently only on Losartan 100 mg daily.  - PTA losartan 100 mg daily    # Hypothyroidism  Per 3/10/20 OP visit: Patient was initially taking LT 25 mcg but she took only for 8 days and stopped it 2/2020 as she felt that it caused edema.  - PTA dessicated thyroid tablet 15 mg daily    # Chronic hepatitis C  Per 3/10/20 OP visit: Patient saw [facility] and completed  required testing. Patient was advised to take the medication Mavyret but she had difficulty getting it due to language barrier. Unclear if treatment was completed.  - F/u OP      Diet: Regular Diet Adult    DVT Prophylaxis: Heparin subcutaneous, Pneumatic Compression Devices  Tong Catheter: Not present  Fluids: None  Central Lines: None  Cardiac Monitoring: None  Code Status: Full Code      Disposition Plan   Expected Discharge: greater than 2 midnights  Anticipated discharge location:  TCU      The patient's care was discussed with the Attending Physician, Dr. Sada Hale.    Kate Morales, MS3  Medicine Service, [address]  Securely message with the Vocera Web Console (learn more here)  Text page via [facility] Paging/Directory   Please see signed in provider for up to date coverage information  ______________________________________________________________________    Interval History   No acute events overnight. Reports continued weakness. Denies any pain, SOB, cough, and congestion. Has been working with PT. Per RN, patient has been getting out of bed independently without using call light, concern for fall risk.    Data reviewed today: I reviewed all medications, new labs and imaging results over the last 24 hours.     Physical Exam   Vital Signs: Temp: (!) 96.2  F (35.7  C) Temp src: Oral BP: (!) 149/58 Pulse: 84   Resp: 18 SpO2: 97 % O2 Device: None (Room air)    Weight: 176 lbs 9.6 oz  General: Awake and engaged in conversation. NAD.  Respiratory: Nonlabored breathing on RA. Speaking in complete sentences.  Cardiovascular: Skin warm and well perfused.  Musculoskeletal: No edema. Able to move extremities.  Neurologic: Alert. Responding to questions appropriately.  Psych: Appropriate mood and affect.    Data   Recent Labs   Lab 02/18/22  0707 02/17/22  0745 02/16/22  0734 02/15/22  1353   WBC 4.6 5.3 4.5 3.4*   HGB 9.7* 10.0* 11.1* 11.5*     102* 101* 100   * 135* 151 155    134 134 139   POTASSIUM 4.4 4.5 4.8 4.1   CHLORIDE 108 102 102 106   CO2 26 25 27 29   BUN 20 25 16 11   CR 0.93 1.06* 1.07* 0.91   ANIONGAP 3 7 5 4   TACHO 9.0 8.6 9.3 9.6   GLC 89 91 108* 90   ALBUMIN  --   --   --  3.6   PROTTOTAL  --   --   --  8.3   BILITOTAL  --   --   --  0.3   ALKPHOS  --   --   --  132   ALT  --   --   --  21   AST  --   --   --  22     No results found for this or any previous visit (from the past 24 hour(s)).

## 2022-02-18 NOTE — PROGRESS NOTES
Vital signs:  Temp: 98.4  F (36.9  C) Temp src: Oral BP: 139/44 Pulse: 82   Resp: 18 SpO2: 99 % O2 Device: None (Room air)         Summary: pt slept well this shift with problems. Bed alarm on, pt gets OOB w/o using call light, fall risk          Status: COVID + asymptomatic, impaired mobility and ADLs   Neuro: A&O x4. Slovak speaking. Bed alarm on for safety.Got OOB to the bathroom w/o calling for help, safety reinforced, bed alarm active  Respiratory: WDL on RA sating > 90. Denies SOB   Cardiac: WDL, denies cardiac chest pain  GI/: Voids w/out difficulty. LBM 2/15/22  Diet: Regular  Pain: Bilateral leg pain/neuropathy managed w/ PRN oxy q 4h   Incision/Drains: none   IV Access: R PIV SL  Labs: Reviewed   Activity: A1 w/ walker.    New changes this shift: PT consult completed yesterday      Plan: Continue w/ POC

## 2022-02-18 NOTE — PLAN OF CARE
PT-7B: Worked with patient using [employer]  via iPad this date - extensive education provided to patient, her care giver (who called during PT session) & a family member (who called during PT session). Patient struggles with understanding spinal precautions - required verbal instruction & physical assist from PT to transition from supine to/from sidelying to/from sitting throughout session (completed this transition 5 times total throughout session). Patient has moved from supine to long sitting to short sitting throughout her life & it is difficult to change this behavior & it is unclear if patient fully understands the reason for these precautions despite continual education. Her care giver states that she can provide 2.5 hours of assistance daily - PT explained need for 24/7 assistance to maintain precautions, don TLSO & complete household tasks that would require lifting, bending, twisting.  Attempted to explain to care giver & family member that these precautions & patient's need for assistance to maintain these precautions are the reason for the recommendation for TCU at this time. Patient, her care giver & her family all reporting to PT that patient does not want to go to TCU & will go home with support. Despite multiple attempts to clarify the support, it does not appear that patient will have 24/7 assistance. At this time, she cannot maintain her spinal precautions without verbal instruction & physical assist.

## 2022-02-18 NOTE — DISCHARGE SUMMARY
[facility]  Discharge Summary - Medicine & Pediatrics       Date of Admission:  2/15/2022  Date of Discharge:  2/21/2022  Discharging Provider: Dr. Mike Capps  Discharge Service: Medicine Service, SYDNIE TEAM 2    Discharge Diagnoses   L4 endplate compression fracture  Chronic T7 compression fracture  Covid-19 positive (asymptomatic)  Pre-Renal STACY  Chronic thrombocytopenia   Chronic anemia  Hypertension  Hypothyroidism  Chronic hepatitis C  Osteoporosis     Follow-ups Needed After Discharge    - Neurosurgery follow-up appointment to decide furture interventions for the L4 compression fracture   - PCP follow-up appointment for continued pain management, help with community resources, and continue management of chronic problems listed below that are known to PCP.    Discharge Disposition   Discharged to home  Condition at discharge: Stable    Hospital Course   Junaid Gunter was admitted on 2/15/2022 for L4 endplate compression fracture.  The following problems were addressed during her hospitalization:    # L4 endplate compression fracture with radiculopathy  # Chronic T7 compression fracture  This patient has a history of a T7 compression fracture in 6/2020. Last seen by neurosurgery outpatient 3/2021 at which time imaging demonstrated stable appearance of chronic T7 fracture with multilevel lumbar spondylosis and severe stenosis of L4-L5. On arrival to ED 2/15/22, patient reported worsening back and radicular pain in addition to inability to ambulate x1 week. XR lumbar spine, MRI lumbar/thoracic spine, and CT lumbar/thoracic spine showed a new L4 inferior endplate compression fracture with persistent and worsened severe canal stenosis L4-L5. Neurosurgery was consulted and recommended a TSLO brace with XR's and to follow-up outpatient for potential surgical intervention in the future. PT and OT saw and evaluated the patient and recommended that she go to a TCU upon  discharge, but patient refused saying she would rather go home with her granddaughter. This was discussed with the care coordinator and the family and the follow was done to make the safest discharge for the patient:   > 1 week of oxycodone for breakthrough pain   > Outpatient referrals for PT/OT   > Patient's insurance does not cover any home agencies   > Orders placed for commode (given at discharge) and an electrical hospital bed (to be delivered to home)     # Incidentally COVID positive, asymptomatic  Upon admission patient denied any SOB, cough, loss of taste/smell. Did not required any oxygen supplementation. Received COVID-19 moderna immunizations 3/17/21 and 4/14/21. Has not received booster. D dimer 1.04 on 2/16/22. Per ID recommendation, patient received 3 days of remdesivir while inpatient.      # Increased creatinine, suspect pre-renal d/t hypovolemia - resolved  Creatinine had a slight rise upon admission, but after replacing with IVF and oral intake, this then resolved prior to discharge.     Stable / Chronic Problems  ================================================  # Chronic thrombocytopenia  # Chronic anemia  # Hypertension  # Hypothyroidism  # Chronic hepatitis C  # Osteoporosis   The above chronic problems were monitored and continued treatments per outpatient PCP. Will have a follow-up with PCP in 1-2 weeks to readdress these issues.    Consultations This Hospital Stay   PHYSICAL THERAPY ADULT IP CONSULT  OCCUPATIONAL THERAPY ADULT IP CONSULT  NEUROSURGERY ADULT IP CONSULT  ORTHOSIS BRACE IP CONSULT  VASCULAR ACCESS CARE ADULT IP CONSULT    Code Status   Full Code     The patient was discussed with Dr. Mike Capps.    Art Carter MD  [facility] [address] [address]  Phone: [phone]  ______________________________________________________________________    Physical Exam   Vital Signs: Temp: 98.4  F (36.9  C) Temp src: Oral BP: 139/44 Pulse: 82    Resp: 18 SpO2: 99 % O2 Device: None (Room air)    Weight: 176 lbs 9.6 oz  General: Awake and engaged in conversation. NAD.  Respiratory: Nonlabored breathing on RA. Speaking in complete sentences.  Cardiovascular: Skin warm and well perfused.  Musculoskeletal: No edema. Able to move extremities.  Neurologic: Alert. Responding to questions appropriately.  Psych: Appropriate mood and affect.      Primary Care Physician   Todd Han    Discharge Orders     Significant Results and Procedures   Results for orders placed or performed during the hospital encounter of 02/15/22   XR Lumbar Spine 2/3 Views    Narrative    EXAM: XR LUMBAR SPINE 2-3 VIEWS  2/15/2022 3:30 PM      HISTORY: low back pain, sciatica    COMPARISON: MRI 6/23/2020    FINDINGS: Portable AP and crosstable lateral view of the lumbar spine.    Bony detail is significantly limited, especially on the lateral view.    5 lumbar type vertebral bodies.    Possible anterior wedging of L4 on L5, poorly detailed. Potential disc  space loss is obscured. Lower lumbar facet hypertrophy.    Vascular calcifications .      Impression    IMPRESSION: Limited bony detail on the lateral view. Spondylosis  poorly detailed. Possible anterior wedging of L4 on L5.         NATHALIE GAMING MD (Joe)         SYSTEM ID:  [id number]   MR Lumbar Spine w/o Contrast    Narrative    Thoracic and Lumbar spine MRI without contrast 2/15/2022    History: Mid-back pain; Mid-back pain, compression fracture suspected;  Mid-back pain, neuro deficit.    Comparison: CT 3/1/2021 and MRI 6/23/2020    Technique:  Axial T2-weighted, and sagittal T1, STIR, and T2-weighted  images of the lumbar spine without intravenous contrast.     Sagittal T1, STIR, and T2-weighted images of the thoracic spine  without contrast were obtained, with axial T2-weighted images through  levels of interest in the thoracic spine.     Findings:  Thoracic Spine: Exaggerated thoracic kyphosis with unchanged moderate  T7  compression deformity with superior endplate Schmorl node and  approximately 50% height loss in the central vertebral body. No  definite abnormal signal in the thoracic spinal cord at any level.  Spinal canal and neural foramen are patent.  Alignment of the thoracic vertebra appears within normal limits. Bone  marrow signal intensity on noncontrast images appears unremarkable.  Overall no change since 6/23/2020.    Lumbar Spine:  There are 5 type lumbar vertebra, used for the purposes of this  dictation.  The tip of the conus is at L2. Grade 1 anterolisthesis of  L5 on S1, unchanged since prior MRI. Lumbar spine alignment is  otherwise preserved. Regarding the bone marrow, there is abnormal T1  hypointensity surrounding the L4-5 disc space with STIR hyperintensity  in a similar distribution. There is abnormal widening of the L4-5 disc  space and mild narrowing of the L5-S1 disc space. Since 6/23/2020, new  large Schmorl's node like indentation along inferior endplate of L4.  .  On a level by level basis, the findings are as follows:    L1-2: Mild disc bulge. Bilateral ligamentum flavum hypertrophy.  Minimal small spinal canal narrowing. Neural foramina are patent.    L2-3: Mild bilateral ligamentum flavum hypertrophy. Mild disc bulge.  Mild canal narrowing. Neural foramina are grossly patent.     L3-4: Right foraminal disc protrusion. Bilateral facet hypertrophic  changes. Bilateral ligamentum flavum hypertrophy. Right lateral recess  narrowing. Mild to moderate canal narrowing and narrowing of the right  lateral recess with likely impingement of the right L4 nerve root.  Left neural foramen is patent. Mild right neural foraminal stenosis.     L4-5: Significant disc osteophyte complex and significant bilateral  ligament flavum hypertrophy and facet hypertrophy. There is severe  canal stenosis. There is moderate left, moderate to severe right  foraminal stenoses. There is right foraminal disc protrusion.  The  findings appear slightly progressed since 6/23/2020. There is fluid  signal along the left facet joint. The fluid signal also extends  inferiorly from the facet joint posterior to the left lamina. There  are inflammatory changes around right facet joint as well. Abnormal  widened appearance of the disc space with loss of definition of the L4  inferior endplate. There is approximately 80% height loss in the  central L4 vertebral body.     L5-S1: Mild anterolisthesis. Mild to moderate right neural foraminal  narrowing. Spinal canal and left neural foramen are patent.    No abnormal paraspinous collection.   The visualized paraspinous tissues anteriorly are unremarkable.      Impression    Impression:  Thoracic spine: No acute abnormality or abnormal signal in the cord.    Lumbar spine:   1. New L4 inferior endplate compression fracture with 80% loss of  height in the central disc and mild L5 superior endplate compression  deformity. Degenerative signal changes along inferior L4 and superior  L5 endplates overall findings are felt to be due to facet degenerative  changes. If there is any clinical concern for discitis osteomyelitis  or underlying malignant process, contrast enhanced imaging can be  obtained.   2. Persistent and worsened now severe canal stenosis at L4-L5 due to  facet arthropathy changes and disc osteophyte complex.     I have personally reviewed the examination and initial interpretation  and I agree with the findings.    HERIBERTO OROZCO MD         SYSTEM ID:  [id number]   MR Thoracic Spine w/o Contrast    Narrative    Thoracic and Lumbar spine MRI without contrast 2/15/2022    History: Mid-back pain; Mid-back pain, compression fracture suspected;  Mid-back pain, neuro deficit.    Comparison: CT 3/1/2021 and MRI 6/23/2020    Technique:  Axial T2-weighted, and sagittal T1, STIR, and T2-weighted  images of the lumbar spine without intravenous contrast.     Sagittal T1, STIR, and T2-weighted images of the  thoracic spine  without contrast were obtained, with axial T2-weighted images through  levels of interest in the thoracic spine.     Findings:  Thoracic Spine: Exaggerated thoracic kyphosis with unchanged moderate  T7 compression deformity with superior endplate Schmorl node and  approximately 50% height loss in the central vertebral body. No  definite abnormal signal in the thoracic spinal cord at any level.  Spinal canal and neural foramen are patent.  Alignment of the thoracic vertebra appears within normal limits. Bone  marrow signal intensity on noncontrast images appears unremarkable.  Overall no change since 6/23/2020.    Lumbar Spine:  There are 5 type lumbar vertebra, used for the purposes of this  dictation.  The tip of the conus is at L2. Grade 1 anterolisthesis of  L5 on S1, unchanged since prior MRI. Lumbar spine alignment is  otherwise preserved. Regarding the bone marrow, there is abnormal T1  hypointensity surrounding the L4-5 disc space with STIR hyperintensity  in a similar distribution. There is abnormal widening of the L4-5 disc  space and mild narrowing of the L5-S1 disc space. Since 6/23/2020, new  large Schmorl's node like indentation along inferior endplate of L4.  .  On a level by level basis, the findings are as follows:    L1-2: Mild disc bulge. Bilateral ligamentum flavum hypertrophy.  Minimal small spinal canal narrowing. Neural foramina are patent.    L2-3: Mild bilateral ligamentum flavum hypertrophy. Mild disc bulge.  Mild canal narrowing. Neural foramina are grossly patent.     L3-4: Right foraminal disc protrusion. Bilateral facet hypertrophic  changes. Bilateral ligamentum flavum hypertrophy. Right lateral recess  narrowing. Mild to moderate canal narrowing and narrowing of the right  lateral recess with likely impingement of the right L4 nerve root.  Left neural foramen is patent. Mild right neural foraminal stenosis.     L4-5: Significant disc osteophyte complex and significant  bilateral  ligament flavum hypertrophy and facet hypertrophy. There is severe  canal stenosis. There is moderate left, moderate to severe right  foraminal stenoses. There is right foraminal disc protrusion. The  findings appear slightly progressed since 6/23/2020. There is fluid  signal along the left facet joint. The fluid signal also extends  inferiorly from the facet joint posterior to the left lamina. There  are inflammatory changes around right facet joint as well. Abnormal  widened appearance of the disc space with loss of definition of the L4  inferior endplate. There is approximately 80% height loss in the  central L4 vertebral body.     L5-S1: Mild anterolisthesis. Mild to moderate right neural foraminal  narrowing. Spinal canal and left neural foramen are patent.    No abnormal paraspinous collection.   The visualized paraspinous tissues anteriorly are unremarkable.      Impression    Impression:  Thoracic spine: No acute abnormality or abnormal signal in the cord.    Lumbar spine:   1. New L4 inferior endplate compression fracture with 80% loss of  height in the central disc and mild L5 superior endplate compression  deformity. Degenerative signal changes along inferior L4 and superior  L5 endplates overall findings are felt to be due to facet degenerative  changes. If there is any clinical concern for discitis osteomyelitis  or underlying malignant process, contrast enhanced imaging can be  obtained.   2. Persistent and worsened now severe canal stenosis at L4-L5 due to  facet arthropathy changes and disc osteophyte complex.     I have personally reviewed the examination and initial interpretation  and I agree with the findings.    HERIBERTO OROZCO MD         SYSTEM ID:  [id number]   XR Chest Port 1 View    Narrative    EXAM: XR CHEST 1 VW 2/15/2022      HISTORY: covid.    COMPARISON: CT 6/20/2020.     TECHNIQUE: Frontal view of the chest.    FINDINGS: Hyperinflated lungs with mixed hazy/streaky opacities in  the  lung bases. Remaining lungs are relatively clear without focal  consolidation. Cardiomediastinal silhouette is within normal limits.  The pulmonary vasculature is distinct. No acute abnormality of the  bony thorax and visualized upper abdomen. Unchanged convex right  thoracic scoliosis.      Impression    IMPRESSION: Mixed streaky opacities in the lung bases, likely  atelectasis. Remaining lungs are clear.    I have personally reviewed the examination and initial interpretation  and I agree with the findings.    LYLY ZELAYA MD         SYSTEM ID:  [id number]   MR Lumbar Spine w Contrast    Narrative    EXAM: MR LUMBAR SPINE W CONTRAST  LOCATION: [facility]  DATE/TIME: 2/16/2022 12:14 AM    INDICATION: Back pain  COMPARISON: None.  CONTRAST: 7.5mL Gadavist  TECHNIQUE: Routine Lumbar Spine MRI with IV contrast.    FINDINGS:   Nomenclature is based on 5 lumbar type vertebral bodies.     L4 inferior endplate large central concavity resulting in severe central vertebral body compression deformity. The inferior endplate demonstrates mild enhancement corresponding to mild increased STIR signal on MRI lumbar spine 02/15/2022 likely due to   mild bone marrow edema. Findings may reflect chronic fracture with degenerative type I Modic changes or acute/subacute fracture.      L5 vertebral body mild to moderate compression deformity. The superior endplate and mid vertebral body demonstrates pronounced enhancement corresponding to increased STIR signal on MRI lumbar spine 02/15/2022 likely due to pronounced bone marrow edema.   Findings may reflect chronic fracture with pronounced degenerative type I Modic changes or acute to subacute fracture.    Remaining lumbar vertebral body heights are within normal limits.     Multilevel spondylosis result in various levels and degrees of central canal stenosis and neural foraminal stenosis. L4-L5 high-grade severe canal stenosis. Please  defer characterization of the spondylosis and the effect on the thecal sac and neural   foramina to the MRI lumbar spine 02/15/2022. L4-L5 grade 1 anterolisthesis.    No intradural pathologic enhancement. Anterior epidural space demonstrates engorged venous plexus throughout. No concerning enhancement within the spinal canal.    L4-L5 interspinous bursitis with enhancement        Impression    IMPRESSION:  1.  L4 inferior endplate large central concavity resulting in severe central vertebral body compression deformity. The inferior endplate demonstrates mild enhancement corresponding to mild increased STIR signal on MRI lumbar spine 02/15/2022 likely due   to mild bone marrow edema. Findings may reflect chronic fracture with degenerative type I Modic changes or acute/subacute fracture.      2.  L5 vertebral body mild to moderate compression deformity. The superior endplate and mid vertebral body demonstrates pronounced enhancement corresponding to increased STIR signal on MRI lumbar spine 02/15/2022 likely due to pronounced bone marrow   edema. Findings may reflect chronic fracture with pronounced degenerative type I Modic changes or acute to subacute fracture.    3.  L4-L5 discitis osteomyelitis less unlikely given no significant increased T2/STIR signal (fluid signal) within the L4-L5 disc space on noncontrast MRI lumbar spine 02/15/2022. Morphology makes metastatic disease unlikely.     4.  Multilevel spondylosis. L5-S1 grade 1 anterolisthesis.    5.  L4-L5 severe high-grade thecal sac stenosis redemonstrated from noncontrast MR lumbar spine 02/15/2022.    6.  L4-L5 interspinous bursitis.     7.  No concerning enhancement within the spinal canal.     CT Thoracic Spine w/o Contrast    Narrative    EXAM: CT THORACIC SPINE W/O CONTRAST  LOCATION: [facility]  DATE/TIME: 2/15/2022 11:57 PM    INDICATION: Back pain.  COMPARISON: MRI thoracic spine 06/22/2020  TECHNIQUE:  Routine CT Thoracic Spine without IV contrast. Multiplanar reformats. Dose reduction techniques were used.     FINDINGS:  VERTEBRA:   Diffuse bony demineralization.     T1 vertebral body within normal limits.    T2 and T3 vertebral body mild chronic compression deformities similar to prior exam.    T4, T5, T6 vertebral body mild to moderate chronic compression deformities similar to prior exam.    T7 severe chronic compression deformity similar to prior exam.    T8 and T9 mild to moderate chronic compression deformities similar to prior exam.    T10, T11, and T12 mild chronic compression deformities similar to prior examination.    No acute fracture. No acute post traumatic subluxation.    CANAL/FORAMINA: Mild exaggerated thoracic kyphosis. T6-T7 mild grade 1 anterolisthesis measuring 1 to 2 mm. No additional subluxations. Multilevel spondylosis result in various levels and degrees of central canal stenosis and neural foraminal stenosis.   No critical central canal stenosis. Please defer evaluation of the spondylosis to the MRI thoracic spine 02/15/2022.    PARASPINAL: Vascular calcifications. Mild hiatal hernia. Midesophagus fluid suggesting reflux and/or dysmotility.      Impression    IMPRESSION:  1.  Diffuse bony demineralization.   2.  Thoracic spine demonstrates multiple chronic compression deformities most severe at T7 described above.  3.  Multilevel spondylosis described above.     Lumbar spine CT w/o contrast    Addendum: 2/16/2022    L5-S1 grade 1 anterolisthesis measuring 4 to 5 mm secondary to spondylosis redemonstrated from   MRI lumbar spine 02/15/2022.     L4-L5 high-grade severe central canal stenosis redemonstrated from MRI lumbar spine 02/15/2022.        Narrative    EXAM: CT LUMBAR SPINE W/O CONTRAST  LOCATION: [facility]  DATE/TIME: 2/15/2022 11:57 PM    INDICATION: Back pain  COMPARISON: MRI lumbar spine 06/22/2020, MRI lumbar spine  02/15/2022  TECHNIQUE: Routine CT Lumbar Spine without IV contrast. Multiplanar reformats. Dose reduction techniques were used.     FINDINGS:  VERTEBRA:   Diffuse bony demineralization.     L4 inferior endplate large central concavity resulting in severe central vertebral body compression deformity. Findings redemonstrated from recent prior examination. There is mild bone marrow edema adjacent to the compression deformity on MRI 02/15/2022,   which could reflect chronic fracture with degenerative type I Modic changes or acute/subacute fracture.      L5 vertebral body mild to moderate compression deformity. The superior endplate and mid vertebral body demonstrates bone marrow edema, which could reflect chronic fracture with pronounced degenerative type I Modic changes or acute to subacute fracture.    Remaining lumbar vertebral body heights are within normal limits. Otherwise, no acute fracture.    CANAL/FORAMINA: Multilevel spondylosis result in various levels and degrees of central canal stenosis and neural foraminal stenosis. L4-L5 high-grade severe canal stenosis. Please defer characterization of the spondylosis and the effect on the thecal sac   and neural foramina to the MRI lumbar spine 02/15/2022.    PARASPINAL: Left kidney midpole small cyst. No specific follow-up recommended. Vascular calcifications.      Impression    IMPRESSION:  1.  L4 inferior endplate large central concavity resulting in severe central vertebral body compression deformity. Findings redemonstrated from recent prior examination. There is mild bone marrow edema adjacent to the compression deformity on MRI   02/15/2022, which could reflect chronic fracture with degenerative type I Modic changes or acute/subacute fracture.      2.  L5 vertebral body mild to moderate compression deformity. The superior endplate and mid vertebral body demonstrates bone marrow edema, which could reflect chronic fracture with pronounced degenerative type I  Modic changes or acute to subacute   fracture.    3.  Otherwise, no acute fracture.    4.  Diffuse bony demineralization.    5.  Multilevel spondylosis.    6.  L4-L5 severe high-grade severe central canal stenosis.     XR Lumbar Spine 2/3 Views    Narrative    EXAM: XR LUMBAR SPINE 2-3 VIEWS  LOCATION: [facility]  DATE/TIME: 2/17/2022 2:25 AM    INDICATION: New L4 endplate compression fracture.  COMPARISON: CT dated 02/15/2022 . MRI brain dated 02/15/2022.  TECHNIQUE: CR Lumbar Spine.      Impression    IMPRESSION: Diminished osseous mineralization and patient body habitus markedly degrade evaluation of the osseous structures. Stable vertebral body heights. Endplate sclerosis is noted at L4 inferiorly and L5 superiorly. No definite new compression   fracture deformities identified. Apparent grade 1 retrolisthesis at the levels of T12-L1, L1-L2 and L2-L3 is favored to be related to patient positioning. Otherwise stable alignment. No definite acute extraspinal abnormality identified.       Discharge Medications   Current Discharge Medication List      START taking these medications    Details   oxyCODONE (ROXICODONE) 5 MG tablet Take 1 tablet (5 mg) by mouth every 6 hours as needed for pain  Qty: 10 tablet, Refills: 0         CONTINUE these medications which have NOT CHANGED    Details   ACETAMINOPHEN EXTRA STRENGTH 500 MG tablet       aspirin (ASA) 81 MG EC tablet Take 1 tablet (81 mg) by mouth daily  Qty: 90 tablet, Refills: 1    Associated Diagnoses: Atherosclerosis of aorta (H)      diclofenac (VOLTAREN) 1 % topical gel Refills: 3      ferrous gluconate (FERGON) 324 (38 Fe) MG tablet       gabapentin (NEURONTIN) 300 MG capsule Take 1 capsule (300 mg) by mouth 2 times daily  Qty: 60 capsule, Refills: 1    Associated Diagnoses: Peripheral polyneuropathy; Pain of left side of body      losartan (COZAAR) 100 MG tablet       SENNA-LAX 8.6 MG tablet       thyroid (ARMOUR) 15  MG tablet Take 1 tablet (15 mg) by mouth daily  Qty: 30 tablet, Refills: 1    Associated Diagnoses: Hypothyroidism, unspecified type      tiZANidine (ZANAFLEX) 4 MG tablet Take 1 tablet (4 mg) by mouth 2 times daily as needed for muscle spasms  Qty: 30 tablet, Refills: 1    Associated Diagnoses: Neck pain      vitamin D3 (CHOLECALCIFEROL) 2000 units (50 mcg) tablet            Allergies   Allergies   Allergen Reactions     Egg [Chicken-Derived Products (Egg)] Itching

## 2022-02-19 LAB
CREAT SERPL-MCNC: 0.88 MG/DL (ref 0.52–1.04)
GFR SERPL CREATININE-BSD FRML MDRD: 69 ML/MIN/1.73M2
HOLD SPECIMEN: NORMAL
PLATELET # BLD AUTO: 134 10E3/UL (ref 150–450)

## 2022-02-19 PROCEDURE — 96372 THER/PROPH/DIAG INJ SC/IM: CPT

## 2022-02-19 PROCEDURE — 250N000013 HC RX MED GY IP 250 OP 250 PS 637

## 2022-02-19 PROCEDURE — 85049 AUTOMATED PLATELET COUNT: CPT

## 2022-02-19 PROCEDURE — 250N000013 HC RX MED GY IP 250 OP 250 PS 637: Performed by: STUDENT IN AN ORGANIZED HEALTH CARE EDUCATION/TRAINING PROGRAM

## 2022-02-19 PROCEDURE — 82565 ASSAY OF CREATININE: CPT | Performed by: STUDENT IN AN ORGANIZED HEALTH CARE EDUCATION/TRAINING PROGRAM

## 2022-02-19 PROCEDURE — G0378 HOSPITAL OBSERVATION PER HR: HCPCS

## 2022-02-19 PROCEDURE — 36415 COLL VENOUS BLD VENIPUNCTURE: CPT

## 2022-02-19 PROCEDURE — 99225 PR SUBSEQUENT OBSERVATION CARE,LEVEL II: CPT | Mod: GC | Performed by: INTERNAL MEDICINE

## 2022-02-19 PROCEDURE — 250N000011 HC RX IP 250 OP 636

## 2022-02-19 PROCEDURE — 99207 PR CDG-CODE CATEGORY CHANGED: CPT | Performed by: INTERNAL MEDICINE

## 2022-02-19 RX ORDER — BUDESONIDE 0.25 MG/2ML
0.25 INHALANT ORAL 2 TIMES DAILY
COMMUNITY

## 2022-02-19 RX ORDER — LEVOTHYROXINE SODIUM 25 UG/1
25 TABLET ORAL DAILY
COMMUNITY

## 2022-02-19 RX ORDER — FLUVOXAMINE MALEATE 100 MG
100 TABLET ORAL 2 TIMES DAILY
COMMUNITY

## 2022-02-19 RX ORDER — AMLODIPINE BESYLATE 2.5 MG/1
2.5 TABLET ORAL DAILY
COMMUNITY

## 2022-02-19 RX ADMIN — ASPIRIN 81 MG: 81 TABLET, COATED ORAL at 08:29

## 2022-02-19 RX ADMIN — OXYCODONE HYDROCHLORIDE 2.5 MG: 5 TABLET ORAL at 03:53

## 2022-02-19 RX ADMIN — LOSARTAN POTASSIUM 100 MG: 100 TABLET, FILM COATED ORAL at 08:29

## 2022-02-19 RX ADMIN — THYROID, PORCINE 15 MG: 15 TABLET ORAL at 08:29

## 2022-02-19 RX ADMIN — GABAPENTIN 300 MG: 300 CAPSULE ORAL at 08:29

## 2022-02-19 RX ADMIN — GABAPENTIN 300 MG: 300 CAPSULE ORAL at 19:21

## 2022-02-19 RX ADMIN — HEPARIN SODIUM 5000 UNITS: 5000 INJECTION, SOLUTION INTRAVENOUS; SUBCUTANEOUS at 17:31

## 2022-02-19 RX ADMIN — HEPARIN SODIUM 5000 UNITS: 5000 INJECTION, SOLUTION INTRAVENOUS; SUBCUTANEOUS at 06:00

## 2022-02-19 NOTE — PROGRESS NOTES
[facility]    Progress Note - Medicine Service, MAROON TEAM 2       Date of Admission:  2/15/2022    Assessment & Plan        Junaid Gunter is a 71 YO Tristanian-speaking F with a h/o chronic hepatitis C, peripheral neuropathy, osteoporosis, hypothyroidism, hypertension, chronic thrombocytopenia and degenerative disc disease who was admitted on 2/15/2022 with poor ambulation, LR compression fracture, and incidentally found to be COVID-19 positive.    Changes today:  - Pending dispo, granddaughter will need to come to hospital for brace training prior to discharge home  - Ctn IV Remdesivir while inpatient (3 day course)    # L4 endplate compression fracture with radiculopathy  # Chronic T7 compression fracture  Hx T7 compression fracture 6/2020. Last seen by neurosurgery outpatient 3/2021 at which time imaging demonstrated stable appearance of chronic T7 fracture with multilevel lumbar spondylosis and severe stenosis of L4-L5. On arrival to ED 2/15/22, patient reported worsening back and radicular pain in addition to inability to ambulate x1 week. XR lumbar spine, MRI lumbar/thoracic spine, and CT lumbar/thoracic spine checked on admission per NS recs. Imaging showed a new L4 inferior endplate compression fracture with persistent and worsened severe canal stenosis L4-L5.  - Neurosurgery consulted:   > TSLO brace   > S/p XR's with brace, no NS intervention planned   > Plan to f/u OP if symptoms persist to discuss potential surgical intervention; no neurosurgical intervention warranted at this time  - PT/OT consult 2/17/22: recommend TCU  - Pain control   > Tylenol PRN   > oxycodone 2.5 mg q4h PRN for breakthrough pain   > PTA gabapentin 300 mg BID    # Incidentally COVID positive, asymptomatic  Asymptomatic; denies any SOB, cough, loss of taste/smell. Doing well on RA. Received COVID-19 moderna immunizations 3/17/21 and 4/14/21. Has not received booster. D dimer 1.04 on  2/16/22.  - DVT ppx: Heparin SQ  - Patient was not taking any COVID prophylactic medications on admission   > Per ID: reasonable to administer 3 day course of Remdesivir (200mg x 1, followed by 100mg/d x 2 days IV) given patient's age and comorbid conditions; started 2/17/22, will complete course 2/19/22 assuming patient remains inpatient  - Offer COVID-19 booster on discharge or OP f/u    # Osteoporosis   Previously on alendronate in 6/2020, unclear when discontinued.  - Plan for PCP f/u on discharge for consideration of further bisphosphonate therapy    # Increased creatinine, suspect pre-renal d/t hypovolemia - resolved  2/18/22 creatinine 0/93 (0.91 on 2/15/22, 1/07 on 2/16/22).  - 1L LR bolus 2/16/22  - Encourage PO intake  - Will CTM creatinine given Remdesivir administration    Stable / Chronic Problems  ================================================    # Chronic thrombocytopenia  2/18/22 platelet count 132.    # Chronic anemia  2/18/22 9.7. Baseline appears to be 9-10.    # Hypertension  Per 3/10/20 OP visit: Patient was previously on Losartan/HCTZ and amlodipine but currently only on Losartan 100 mg daily.  - PTA losartan 100 mg daily    # Hypothyroidism  Per 3/10/20 OP visit: Patient was initially taking LT 25 mcg but she took only for 8 days and stopped it 2/2020 as she felt that it caused edema.  - PTA dessicated thyroid tablet 15 mg daily    # Chronic hepatitis C  Per 3/10/20 OP visit: Patient saw  Hepatology and completed required testing. Patient was advised to take the medication Mavyret but she had difficulty getting it due to language barrier. Unclear if treatment was completed.  - F/u OP      Diet: Regular Diet Adult    DVT Prophylaxis: Heparin subcutaneous, Pneumatic Compression Devices  Tong Catheter: Not present  Fluids: None  Central Lines: None  Cardiac Monitoring: None  Code Status: Full Code      Disposition Plan   Expected Discharge: greater than 2 midnights  Anticipated discharge  location:  [facility]      The patient's care was discussed with the Attending Physician, Dr. Sada Hale.    Art Carter MD  Medicine Service, [facility] TEAM [address] [place] [address]  Securely message with the Vocera Web Console (learn more here)  Text page via [facility] Paging/Directory   Please see signed in provider for up to date coverage information  ______________________________________________________________________    Interval History   NAEON. Reports continued weakness. Denies any pain, SOB, cough, and congestion. Has been working with PT. Per RN, patient has been getting out of bed independently without using call light, concern for fall risk.    Data reviewed today: I reviewed all medications, new labs and imaging results over the last 24 hours.     Physical Exam   Vital Signs: Temp: (!) 96.2  F (35.7  C) Temp src: Oral BP: (!) 140/42 Pulse: 74   Resp: 19 SpO2: 98 % O2 Device: None (Room air)    Weight: 176 lbs 9.6 oz  General: Awake and engaged in conversation. NAD.  Respiratory: Nonlabored breathing on RA. Speaking in complete sentences.  Cardiovascular: Skin warm and well perfused.  Musculoskeletal: No edema. Able to move extremities.  Neurologic: Alert. Responding to questions appropriately.  Psych: Appropriate mood and affect.    Data   Recent Labs   Lab 02/19/22  0746 02/18/22  0707 02/17/22  0745 02/16/22  0734 02/15/22  1353   WBC  --  4.6 5.3 4.5 3.4*   HGB  --  9.7* 10.0* 11.1* 11.5*   MCV  --  100 102* 101* 100   * 132* 135* 151 155   NA  --  137 134 134 139   POTASSIUM  --  4.4 4.5 4.8 4.1   CHLORIDE  --  108 102 102 106   CO2  --  26 25 27 29   BUN  --  20 25 16 11   CR 0.88 0.93 1.06* 1.07* 0.91   ANIONGAP  --  3 7 5 4   TACHO  --  9.0 8.6 9.3 9.6   GLC  --  89 91 108* 90   ALBUMIN  --   --   --   --  3.6   PROTTOTAL  --   --   --   --  8.3   BILITOTAL  --   --   --   --  0.3   ALKPHOS  --   --   --   --  132   ALT  --   --   --   --  21   AST  --   --   --   --   22     No results found for this or any previous visit (from the past 24 hour(s)).

## 2022-02-19 NOTE — PROGRESS NOTES
Observation Goals:  Vital signs normal or at baseline: met, patient is on room air  Adequate pain control: partially met, patient denies pain

## 2022-02-19 NOTE — UTILIZATION REVIEW
"  [facility] Utilization Review  Admission Status; Secondary Review Determination     Admission Date: 2/15/2022  1:06 PM      Under the authority of the Utilization Management Committee, the utilization review process indicated a secondary review on the above patient.  The review outcome is based on review of the medical records, discussions with staff, and applying clinical experience noted on the date of the review.        (X) Observation Status Appropriate - This patient does not meet hospital inpatient criteria and is placed in observation status. If this patient's primary payer is Medicare and was admitted as an inpatient, Condition Code 44 should be used and patient status changed to \"observation\".   () Observation Status concurrent Review           RATIONALE FOR DETERMINATION   71 yo M with h/o chronic hepatitis, peripheral neuropathy, osteoporosis, hypothyroidism, hypertension, chronic thrombocytopenia, degenerative disc disease, admitted on 2/15 with poor ambulation, lumbar compression fracture and incidental Covid positive.  Patient completed 3-day course of IV remdesivir, imaging showed new L4 inferior endplate compression fracture with worsened canal stenosis L4-5.  Neurosurgery team recommend TLSO brace, using low-dose oxycodone for pain control.  Patient does not have any fevers, hypoxia, pain well controlled, waiting for granddaughter to come to the hospital for brace training, medically stable and ready for discharge, does not meet criteria for inpatient stay, recommend continue observation status      The severity of illness, intensity of service provided, expected LOS make the care appropriate for observation status at this time.        The information on this document is developed by the utilization review team in order for the business office to ensure compliance.  This only denotes the appropriateness of proper admission status and does not reflect the quality of care rendered.       "   The definitions of Inpatient Status and Observation Status used in making the determination above are those provided in the CMS Coverage Manual, Chapter 1 and Chapter 6, section 70.4.      Sincerely,       Sergio Chappell MD  Physician Advisor  Utilization Review-[place]    Phone: [phone]

## 2022-02-19 NOTE — PLAN OF CARE
Assumed cares [id number]    Pt sleeping comfortably between cares. Up to bedside commode independently, does express increased urgency. PIV SL. Regular diet with no intake overnight. VSS on RA. Wears TLSO brace appropriately when getting OOB. Disorientated to time. Endorsed L sided hip pain overnight, given PRN oxy with relief noted.    Continue to provide care per poc.

## 2022-02-19 NOTE — PHARMACY-ADMISSION MEDICATION HISTORY
Admission Medication History Completed by Pharmacy    See [facility] Admission Navigator for allergy information, preferred outpatient pharmacy, prior to admission medications and immunization status.     Medication History Sources:     Patient via  (granddaughter)     Dispense History       Changes made to PTA medication list (reason):    Added:   o Omeprazole 20 mg capsule (per patient & confirmed w/ dispense history)  o Levothyroxine 25 mcg tablet (per patient & confirmed w/ dispense history)    Deleted:   o Thyroid 15 mg tablet; Take 1 tablet by mouth daily (patient has not taken since 3/15/20, she takes levothyroxine for thyroid)    Changed: None    Additional Information:    It was a bit difficult to obtain an up-to-date list as the patient fills medications regularly, however, may not be taking them at home. She confirmed gabapentin (2 times daily per order, not 3 times daily by dispense history), losartan, omeprazole, acetaminophen, and levothyroxine. She denied aspirin as she takes acetaminophen and tizanidine.     I inquired about other medications in the dispense history, however, the patient denies taking these medications.   o Amlodipine 2.5 mg tablet; Take 1 tablet by mouth daily  o Fluvoxamine 100 mg tablet; Take 1 tablet by mouth 2 times daily  o Budesonide 0.25 mg/2mL suspension; Inhale 1 vial via nebulizer twice daily  Prior to Admission medications    Medication Sig Last Dose Taking? Auth Provider   ACETAMINOPHEN EXTRA STRENGTH 500 MG tablet Take 500 mg by mouth every 4 hours as needed for mild pain  Past Week Yes Reported, Patient   gabapentin (NEURONTIN) 300 MG capsule Take 1 capsule (300 mg) by mouth 2 times daily Past Month Yes Sherlyn Orozco MD   levothyroxine (SYNTHROID/LEVOTHROID) 25 MCG tablet Take 25 mcg by mouth daily Past Week Yes Unknown, Entered By History   losartan (COZAAR) 100 MG tablet Take 100 mg by mouth daily  Past Week Yes Reported, Patient   omeprazole (PRILOSEC)  20 MG DR capsule Take 20 mg by mouth daily Past Week Yes Unknown, Entered By History   vitamin D3 (CHOLECALCIFEROL) 2000 units (50 mcg) tablet Take 1 tablet by mouth daily  Past Week Yes Reported, Patient   amLODIPine (NORVASC) 2.5 MG tablet Take 2.5 mg by mouth daily Not Taking  Unknown, Entered By History   aspirin (ASA) 81 MG EC tablet Take 1 tablet (81 mg) by mouth daily  Patient not taking: Reported on 2/19/2022 Not Taking  Sherlyn Orozco MD   budesonide (PULMICORT) 0.25 MG/2ML neb solution Take 0.25 mg by nebulization 2 times daily Not Taking  Unknown, Entered By History   fluvoxaMINE (LUVOX) 100 MG tablet Take 100 mg by mouth 2 times daily Not taking  Unknown, Entered By History   tiZANidine (ZANAFLEX) 4 MG tablet Take 1 tablet (4 mg) by mouth 2 times daily as needed for muscle spasms  Patient not taking: Reported on 2/19/2022 Not Taking  Sherlyn Orozco MD       Date completed: 02/19/22    Medication history completed by: SEYMOUR Murillo, Pharmacy Intern

## 2022-02-20 PROCEDURE — 250N000013 HC RX MED GY IP 250 OP 250 PS 637

## 2022-02-20 PROCEDURE — 99207 PR CDG-MDM COMPONENT: MEETS MODERATE - DOWN CODED: CPT | Performed by: STUDENT IN AN ORGANIZED HEALTH CARE EDUCATION/TRAINING PROGRAM

## 2022-02-20 PROCEDURE — 99232 SBSQ HOSP IP/OBS MODERATE 35: CPT | Mod: GC | Performed by: STUDENT IN AN ORGANIZED HEALTH CARE EDUCATION/TRAINING PROGRAM

## 2022-02-20 PROCEDURE — 250N000013 HC RX MED GY IP 250 OP 250 PS 637: Performed by: STUDENT IN AN ORGANIZED HEALTH CARE EDUCATION/TRAINING PROGRAM

## 2022-02-20 PROCEDURE — G0378 HOSPITAL OBSERVATION PER HR: HCPCS

## 2022-02-20 PROCEDURE — 250N000011 HC RX IP 250 OP 636

## 2022-02-20 PROCEDURE — 96372 THER/PROPH/DIAG INJ SC/IM: CPT

## 2022-02-20 RX ADMIN — OXYCODONE HYDROCHLORIDE 2.5 MG: 5 TABLET ORAL at 01:58

## 2022-02-20 RX ADMIN — GABAPENTIN 300 MG: 300 CAPSULE ORAL at 08:41

## 2022-02-20 RX ADMIN — LOSARTAN POTASSIUM 100 MG: 100 TABLET, FILM COATED ORAL at 08:41

## 2022-02-20 RX ADMIN — ACETAMINOPHEN 650 MG: 325 TABLET, FILM COATED ORAL at 23:02

## 2022-02-20 RX ADMIN — THYROID, PORCINE 15 MG: 15 TABLET ORAL at 08:41

## 2022-02-20 RX ADMIN — ASPIRIN 81 MG: 81 TABLET, COATED ORAL at 08:42

## 2022-02-20 RX ADMIN — ACETAMINOPHEN 650 MG: 325 TABLET, FILM COATED ORAL at 05:13

## 2022-02-20 RX ADMIN — GABAPENTIN 300 MG: 300 CAPSULE ORAL at 19:46

## 2022-02-20 RX ADMIN — HEPARIN SODIUM 5000 UNITS: 5000 INJECTION, SOLUTION INTRAVENOUS; SUBCUTANEOUS at 05:05

## 2022-02-20 RX ADMIN — HEPARIN SODIUM 5000 UNITS: 5000 INJECTION, SOLUTION INTRAVENOUS; SUBCUTANEOUS at 18:14

## 2022-02-20 NOTE — PLAN OF CARE
Discharge goal to be met before discharge:    -diagnostic tests and consults completed and resulted-met   -vital signs normal or at patient baseline-not met hypertensive   -adequate pain control on oral analgesics-not met, pain with movement per pt report.

## 2022-02-20 NOTE — PROGRESS NOTES
[facility]    Progress Note - Medicine Service, MAROON TEAM 2       Date of Admission:  2/15/2022    Assessment & Plan        Junaid Gunter is a 71 YO Iranian-speaking F with a h/o chronic hepatitis C, peripheral neuropathy, osteoporosis, hypothyroidism, hypertension, chronic thrombocytopenia and degenerative disc disease who was admitted on 2/15/2022 with poor ambulation, LR compression fracture, and incidentally found to be COVID-19 positive.    Changes Today:  - Pending dispo, granddaughter will need to come to hospital for brace training prior to discharge home  - Spoke with car coordinator and training will most likely be tomorrow, Monday. Also working on getting home care covered before patient is taken home by family.     # L4 endplate compression fracture with radiculopathy  # Chronic T7 compression fracture  Hx T7 compression fracture 6/2020. Last seen by neurosurgery outpatient 3/2021 at which time imaging demonstrated stable appearance of chronic T7 fracture with multilevel lumbar spondylosis and severe stenosis of L4-L5. On arrival to ED 2/15/22, patient reported worsening back and radicular pain in addition to inability to ambulate x1 week. XR lumbar spine, MRI lumbar/thoracic spine, and CT lumbar/thoracic spine checked on admission per NS recs. Imaging showed a new L4 inferior endplate compression fracture with persistent and worsened severe canal stenosis L4-L5.  - Neurosurgery consulted:   > TSLO brace   > S/p XR's with brace, no NS intervention planned   > Plan to f/u OP if symptoms persist to discuss potential surgical intervention; no neurosurgical intervention warranted at this time  - PT/OT consult 2/17/22: recommend TCU  - Pain control   > Tylenol PRN   > oxycodone 2.5 mg q4h PRN for breakthrough pain   > PTA gabapentin 300 mg BID    # Incidentally COVID positive, asymptomatic  Asymptomatic; denies any SOB, cough, loss of taste/smell. Doing well  on RA. Received COVID-19 moderna immunizations 3/17/21 and 4/14/21. Has not received booster. D dimer 1.04 on 2/16/22.  - DVT ppx: Heparin SQ  - Patient was not taking any COVID prophylactic medications on admission   > Per ID: reasonable to administer 3 day course of Remdesivir (200mg x 1, followed by 100mg/d x 2 days IV) given patient's age and comorbid conditions; started 2/17/22, will complete course 2/19/22)  - Offer COVID-19 booster on discharge or OP f/u    # Osteoporosis   Previously on alendronate in 6/2020, unclear when discontinued.  - Plan for PCP f/u on discharge for consideration of further bisphosphonate therapy    # Increased creatinine, suspect pre-renal d/t hypovolemia - resolved  2/18/22 creatinine 0/93 (0.91 on 2/15/22, 1/07 on 2/16/22).  - 1L LR bolus 2/16/22  - Encourage PO intake  - Will CTM creatinine given Remdesivir administration    Stable / Chronic Problems  ================================================  # Chronic thrombocytopenia  2/18/22 platelet count 132.    # Chronic anemia  2/18/22 9.7. Baseline appears to be 9-10.    # Hypertension  Per 3/10/20 OP visit: Patient was previously on Losartan/HCTZ and amlodipine but currently only on Losartan 100 mg daily.  - PTA losartan 100 mg daily    # Hypothyroidism  Per 3/10/20 OP visit: Patient was initially taking LT 25 mcg but she took only for 8 days and stopped it 2/2020 as she felt that it caused edema.  - PTA dessicated thyroid tablet 15 mg daily    # Chronic hepatitis C  Per 3/10/20 OP visit: Patient saw [facility] and completed required testing. Patient was advised to take the medication Mavyret but she had difficulty getting it due to language barrier. Unclear if treatment was completed.  - F/u OP      Diet: Regular Diet Adult    DVT Prophylaxis: Heparin subcutaneous, Pneumatic Compression Devices  Tong Catheter: Not present  Fluids: None  Central Lines: None  Cardiac Monitoring: None  Code Status: Full Code      Disposition Plan    Expected Discharge: Tomorrow pending PT training for brace.  Anticipated discharge location:  TCU or home with family     The patient's care was discussed with the attending physician, Dr. Capps.    Art Carter MD  Medicine Service, [address]  Securely message with the Vocera Web Console (learn more here)  Text page via [facility] Paging/Directory   Please see signed in provider for up to date coverage information  ______________________________________________________________________    Interval History   NAEON. Denies any pain, SOB, cough, and congestion. Patient has been getting out of bed independently without using call light, concern for fall risk.    Data reviewed today: I reviewed all medications, new labs and imaging results over the last 24 hours.     Physical Exam   Vital Signs: Temp: 96.9  F (36.1  C) Temp src: Oral BP: 131/62 Pulse: 82   Resp: 18 SpO2: 97 % O2 Device: None (Room air)    Weight: 176 lbs 6.4 oz  General: Awake and engaged in conversation. NAD.  Respiratory: Nonlabored breathing on RA. Speaking in complete sentences.  Cardiovascular: Skin warm and well perfused.  Musculoskeletal: No edema. Able to move extremities.  Neurologic: Alert. Responding to questions appropriately.  Psych: Appropriate mood and affect.    Data   Recent Labs   Lab 02/19/22  0746 02/18/22  0707 02/17/22  0745 02/16/22  0734 02/15/22  1353   WBC  --  4.6 5.3 4.5 3.4*   HGB  --  9.7* 10.0* 11.1* 11.5*   MCV  --  100 102* 101* 100   * 132* 135* 151 155   NA  --  137 134 134 139   POTASSIUM  --  4.4 4.5 4.8 4.1   CHLORIDE  --  108 102 102 106   CO2  --  26 25 27 29   BUN  --  20 25 16 11   CR 0.88 0.93 1.06* 1.07* 0.91   ANIONGAP  --  3 7 5 4   TACHO  --  9.0 8.6 9.3 9.6   GLC  --  89 91 108* 90   ALBUMIN  --   --   --   --  3.6   PROTTOTAL  --   --   --   --  8.3   BILITOTAL  --   --   --   --  0.3   ALKPHOS  --   --   --   --  132   ALT  --   --   --    --  21   AST  --   --   --   --  22     No results found for this or any previous visit (from the past 24 hour(s)).

## 2022-02-20 NOTE — PLAN OF CARE
BP (!) 116/37 (BP Location: Left arm, Patient Position: Right side, Cuff Size: Adult Regular)   Pulse 94   Temp 97.7  F (36.5  C) (Oral)   Resp 16   Wt 80 kg (176 lb 6.4 oz)   SpO2 95%   BMI 35.63 kg/m      Time: 6806-3087  Status:Admitted on 02/15/22 due to poor ambulation, lumbar compression fx and COVID-19 positive.   Neuro/Pain:  Pakistani speaking, A&Ox3, disoriented to time. C/o numbness on BLE. C/o pain when getting of bed and walking around. Oxycodone 2.5 mg q 4 prn and scheduled Gabapentin 300 mg twice a day. TLSO brace on at all time.   Activity: independent in her room using walker.   Cardiac/vs: elevated BP, didn't meet parameter for provider notification. Afebrile.   Respiratory: room air, denied coughing and SOB. LS clear, but diminished on lower lobes.   GI/: soft large bm x 1 for this shift. Active bowel sound. Voiding spontaneously without difficult.   Diet: regular, good appetite and denied nausea.   Skin: JOHNNY, refused assessment.   LDAs: PIV saline locked.   Labs/Imaging: reviewed.   Change this shift: none   Plan: continue on obs status, waiting for discharge.

## 2022-02-20 NOTE — PLAN OF CARE
Shift: 1900 - 0730  RE For Admission:  Impaired mobility and ADLs, Bilateral low back pain with bilateral sciatica, Closed compression fracture of L4, COVID19 pos  Code Status: Full Code  Activity: Pt stayed in bed  Pain: Pt had c/o pain and was given PRN Tylenol 650mg PO and Oxycodone 2.5mg PO  Neuro: A&O x4. Able to let needs be known verbally; uses .  Cardiac: VSS. No c/o chest pains  Respiratory: Oxygen sat 96-97 on RA. No wheezing or SOB. Lungs clear  GI/: Pt continent of urine. Up ad bonifacio and ambulates to restroom by self. Has bedside commode. Bowel sounds active. No BM after 11pm  Diet/appetite: Regular diet  LDA's: R  PIV-SL.   Incisions/Drains/Skin: No skin issues noted. Wears TLSO brace  Prophylaxis: Heparin   Labs: Reviewed  New Changes This Shift: None   Plan: Pt changed to observation status. Pending dispo, granddaughter will need to come to hospital for brace training prior to discharge home.     Shahnaz Grimaldo RN, BSN-BC 7B Med/Surg  [facility]

## 2022-02-21 ENCOUNTER — APPOINTMENT (OUTPATIENT)
Dept: PHYSICAL THERAPY | Facility: CLINIC | Age: 72
End: 2022-02-21
Payer: COMMERCIAL

## 2022-02-21 VITALS
BODY MASS INDEX: 35.63 KG/M2 | HEART RATE: 80 BPM | RESPIRATION RATE: 16 BRPM | SYSTOLIC BLOOD PRESSURE: 144 MMHG | TEMPERATURE: 95.6 F | DIASTOLIC BLOOD PRESSURE: 51 MMHG | WEIGHT: 176.4 LBS | OXYGEN SATURATION: 97 %

## 2022-02-21 PROCEDURE — 99239 HOSP IP/OBS DSCHRG MGMT >30: CPT | Mod: GC | Performed by: STUDENT IN AN ORGANIZED HEALTH CARE EDUCATION/TRAINING PROGRAM

## 2022-02-21 PROCEDURE — 250N000013 HC RX MED GY IP 250 OP 250 PS 637

## 2022-02-21 PROCEDURE — 250N000011 HC RX IP 250 OP 636

## 2022-02-21 PROCEDURE — 97530 THERAPEUTIC ACTIVITIES: CPT | Mod: GP

## 2022-02-21 PROCEDURE — 96372 THER/PROPH/DIAG INJ SC/IM: CPT

## 2022-02-21 PROCEDURE — 250N000013 HC RX MED GY IP 250 OP 250 PS 637: Performed by: STUDENT IN AN ORGANIZED HEALTH CARE EDUCATION/TRAINING PROGRAM

## 2022-02-21 PROCEDURE — 97116 GAIT TRAINING THERAPY: CPT | Mod: GP

## 2022-02-21 PROCEDURE — G0378 HOSPITAL OBSERVATION PER HR: HCPCS

## 2022-02-21 RX ORDER — LEVOTHYROXINE SODIUM 25 UG/1
25 TABLET ORAL DAILY
Status: DISCONTINUED | OUTPATIENT
Start: 2022-02-21 | End: 2022-02-21 | Stop reason: HOSPADM

## 2022-02-21 RX ORDER — ACETAMINOPHEN 325 MG/1
650 TABLET ORAL EVERY 6 HOURS PRN
Qty: 100 TABLET | Refills: 0 | Status: SHIPPED | OUTPATIENT
Start: 2022-02-21 | End: 2022-03-23

## 2022-02-21 RX ORDER — OXYCODONE HYDROCHLORIDE 5 MG/1
5 TABLET ORAL 2 TIMES DAILY PRN
Qty: 6 TABLET | Refills: 0 | Status: SHIPPED | OUTPATIENT
Start: 2022-02-21 | End: 2022-02-24

## 2022-02-21 RX ADMIN — LOSARTAN POTASSIUM 100 MG: 100 TABLET, FILM COATED ORAL at 07:52

## 2022-02-21 RX ADMIN — ASPIRIN 81 MG: 81 TABLET, COATED ORAL at 07:52

## 2022-02-21 RX ADMIN — HEPARIN SODIUM 5000 UNITS: 5000 INJECTION, SOLUTION INTRAVENOUS; SUBCUTANEOUS at 05:26

## 2022-02-21 RX ADMIN — OXYCODONE HYDROCHLORIDE 2.5 MG: 5 TABLET ORAL at 05:38

## 2022-02-21 RX ADMIN — LEVOTHYROXINE SODIUM 25 MCG: 0.03 TABLET ORAL at 11:41

## 2022-02-21 RX ADMIN — THYROID, PORCINE 15 MG: 15 TABLET ORAL at 07:52

## 2022-02-21 RX ADMIN — GABAPENTIN 300 MG: 300 CAPSULE ORAL at 07:52

## 2022-02-21 NOTE — PLAN OF CARE
/41 (BP Location: Left arm)   Pulse 75   Temp 96.9  F (36.1  C) (Oral)   Resp 18   Wt 80 kg (176 lb 6.4 oz)   SpO2 95%   BMI 35.63 kg/m     Shift: 9166-5451    Activity: Up to commode  Pain/nausea: Pt c/o pain (prn tylenol), denies nausea  Neuro: A&O x3 (disoriented to time). Able to let needs be known verbally; uses .  Cardiac: VSS. Denies chest pains  Respiratory: Oxygen sat 96-97 on RA. Denies SOB  GI/: Pt continent of urine. Up ad bonifacio, has bedside commode.   Diet/appetite: Regular diet  LDA's: R  PIV-SL.   Incisions/Drains/Skin: No skin issues noted. Wears TLSO brace  Labs: Reviewed  New Changes This Shift: None   Plan: Continue POC, granddaughter will need to come to hospital for brace training and getting homecare covered before discharge

## 2022-02-21 NOTE — PROGRESS NOTES
Care Management Discharge Note    Discharge Date: 02/21/2022       Discharge Disposition:  Home with granddaugter  Discharge Services:  OP PT/OT - no home care available  Discharge DME: [facility] - commode delivered to bedside today. [employer] - bed - orders sent, they will review and get in touch with granddaughter.     Discharge Transportation: family or friend will provide      Education Provided on the Discharge Plan:  yes  Persons Notified of Discharge Plans: Radha, granddaughter  Patient/Family in Agreement with the Plan:  yes    Handoff Referral Completed: Yes    Additional Information:  Per M2 resident patient will discharge home today. Pt is able to get her TLSO brace on herself and only needs help with tightening it. RNCC and M2 resident did conference call with Radha, philippeughter to review discharge plans.     Radha would like a commode and hospital bed ordered. MD will write orders and RNCC will send to [employer]. Will work on getting commode delivered to bedside. Reviewed with Radha that bed orders will need to be reviewed by DME company and that delivery would need to happen after discharge.     Discussed that no home care agencies are available. Radha would like OP PT/OT referral placed.    Patient has PCA hours. Radha wants to have these increased. Discussed that she will need to contact [facility] to go through process to increase hours. Radha requested a note from M2 resident as well describing patients needs with ADLs, etc.     Radha will  pt at 4PM    Spoke with Abdiaziz Charlton (ph:[phone] fx:[phone] [email or web address])who will look out for hospital bed order, face sheet, F2F and progress note. She will contact Radha regarding delivery.     12:20PM Spoke with [facility] Salamanca they will deliver commode to bedside today. Need order signed by attending. M2 working on this.     2PM  Faxed hospital bed order, updated Abdiaziz Charlton via email. Updated  granddaughter    4PM  [employer] needs attending to co-sign hospital bed F2F note and wants height and weight added. Resident will add. He will have attending cosign susanne. Updated Latesha Freed and will get this to her tomorrow.     2/22 8:25AM - faxed over co-signed note to [employer]     Jadyn Coughlin RN, [place]  Float Care Coordinator  Covering 7B [facility]   Pager: [phone]

## 2022-02-21 NOTE — PROGRESS NOTES
Blood pressure 122/41, pulse 75, temperature 96.9  F (36.1  C), temperature source Oral, resp. rate 18, weight 80 kg (176 lb 6.4 oz), SpO2 95 %.  *      Activity: standby assistx1 .  Neuros: A & O x2, disoriented to time and situation.[place] speaking using  for communication..  Cardiac: HR 75, regular rate and rhythm. /41 stable.  Respiratory: LS diminished. O2 sats high 95% r,a Denies SOB. Unlabored. R 20.  GI/: BS+adequate urine output.  Diet: regular   Lines: R PIV  SL Incisions: None.   Labs none .   Pain/nausea: PRN oxycodone x1 for left hip pain . Denies nausea.   New changes this shift: None.   Plan: Continue with TLSO brace on at all time.

## 2022-02-21 NOTE — PLAN OF CARE
Goal Outcome Evaluation: Discharge    Plan of Care Reviewed With: patient     Overall Patient Progress: no change    Patient worked with PT today. Good appetite. Baseline numbness and tingling bilateral feet. Up to bedside commode. Patient's grand-daughter will pick her up at 1800. Medications ready at discharge pharmacy. Waiting for bedside commode to be delivered.

## 2022-02-21 NOTE — PROGRESS NOTES
"Brief Progress Note:    Face to face encounter with MD.    Hospital Bed Documentation:   Hospital bed is required for body positioning, to allow for safe transfers to wheelchair and standing and frequent changes in body position, not feasible in an ordinary bed     NOTE: Patient must have a \"Yes\" in one of the four following questions to qualify for a hospital bed.    1. Does the patient require positioning of the body in ways not feasible with an ordinary bed due to a medical condition that is expected to last at least 1 month? No    2. Does the patient require, for the alleviation of pain, positioning of the body in ways not feasible with an ordinary bed? Yes (Please explain): With the L4 compression fracture, positioning only made with electrical bed can be used to heal alleviate pain.     3. Does the patient require the head of the bed to be elevated more than 30 degrees most of the time due to congestive heart failure, chronic pulmonary disease, or aspiration? No    4. Does the patient require traction that can only be attached to a hospital bed? No    Additional Criteria:    Does the patient require frequent changes in body position and/or have an immediate need for change in body position? Yes - Patient qualifies for Semi Electric Bed     Trapeze Criteria:  (Patient must meet standard hospital bed criteria also)   1. Does patient need this device to sit up because of a respiratory condition, for change in body position for other medical reasons, or to get in or out of bed? No    I, the undersigned, certify that the above prescribed supplies are medically necessary for this patient and is both reasonable and necessary in reference to accepted standards of medical and necessary in reference to accepted standards of medical practice in the treatment of this patient's condition and is not prescribed as a convenience.     Height: 4' 11\"  Weight: 80 kg    Art Carter MD  Internal Medicine Resident, PGY-1  [place] " Team 2

## 2022-02-22 ENCOUNTER — PATIENT OUTREACH (OUTPATIENT)
Dept: CARE COORDINATION | Facility: CLINIC | Age: 72
End: 2022-02-22
Payer: COMMERCIAL

## 2022-02-22 DIAGNOSIS — Z71.89 OTHER SPECIFIED COUNSELING: ICD-10-CM

## 2022-02-22 NOTE — PLAN OF CARE
Physical Therapy Discharge Summary    Reason for therapy discharge:    Discharged to home with outpatient therapy.    Progress towards therapy goal(s). See goals on Care Plan in [facility] electronic health record for goal details.  Goals partially met.  Barriers to achieving goals:   discharge from facility.    Therapy recommendation(s):    Continued therapy is recommended.  Rationale/Recommendations:  for progression towards PLOF.

## 2022-02-22 NOTE — PROGRESS NOTES
Clinic Care Coordination Contact    Background: Care Coordination referral placed from [facility] discharge report for reason of patient meeting criteria for a TCM outreach call by [facility] team.    Assessment: Upon chart review, [facility] Team member will cancel/close the referral for TCM outreach due to reason below:    Patient has a follow up appointment with an appropriate provider today for hospital discharge.    Plan: Care Coordination referral for TCM outreach canceled.    Shelley Helms MA  [facility], [facility]    Clinic Care Coordination Contact  [facility]/Voicemail       Clinical Data: Care Coordinator Outreach  Outreach attempted x 1.  Left message on patient's voicemail with call back information and requested return call.  Plan: Care Coordinator will try to reach patient again in 1-2 business days.    Shelley Helms [facility]  [phone]  [facility]

## 2022-02-22 NOTE — PLAN OF CARE
Goal Outcome Evaluation:    Plan of Care Reviewed With: patient     Overall Patient Progress: adequate for discharge    Alert. Disoriented to time. [place] speaking. Phone  used at bedside for communication. VSS. Denies SOB. C/O back discomfort, declined PRN pain meds. Reports taking pain meds just once a day. Up with stand by assist to the bedside commode. Pt discharging home. Discharge instructions were reviewed (via ) with patient. Pt expressed understanding. Discharge medications were picked up via house orderly and given to patient. Patient was brought down to [facility] where grand-daughter will be picking patient up. A commode was ordered for discharge. This was sent home with patient. Continue to monitor and follow plan of care.

## 2022-02-23 ENCOUNTER — PRE VISIT (OUTPATIENT)
Dept: ORTHOPEDICS | Facility: CLINIC | Age: 72
End: 2022-02-23
Payer: COMMERCIAL

## 2022-03-28 ENCOUNTER — TELEPHONE (OUTPATIENT)
Dept: NEUROSURGERY | Facility: CLINIC | Age: 72
End: 2022-03-28

## 2022-03-28 NOTE — TELEPHONE ENCOUNTER
Called patient to reschedule due provider has an emergency procedure.  Patient cancelled as well, due to to much pain- can't physically get to clinic.  Patient was advised at the direction of nursing to call 911 if pain was severe. Patient didn't want to reschedule as she wants provider to come to the home.

## 2022-03-28 NOTE — TELEPHONE ENCOUNTER
"Called patient this morning to see if she could arrive sooner due to providers going to surgery in the late morning.  Per , patient was not planning on coming to the appointment today and citing transportation as the issue.  Writer gave notice to  to reschedule her appointment to another day that she can get her.   When they attempted to reschedule, the  told staffer she cancelled today since she can't physically make it to the clinic. Deferred to nursing.    Discussed with Julia Skinner CNP who recommends she come in for an in person office visit for a follow up visit in clinic or if she physically can't get care, she will need to call 911 and be taken by ambulance for care.    Used  to discuss with patient.     Patient does not endorse any new symptoms since her discharge.   She reports her pain is too much to come to the clinic and her legs are also not working properly. She does not consider her condition worse since she left the hospital  She continues to use the brace.     Patient reports she has been to many, many doctors, had many tests and appointments and Does not want any further treatment for the back or from  neurosurgery    She reports she ca only go to the ED as she would need a \"stretcher\" to leave the house    Writer asked if our office could talk to her family. Patient gave the ok to talk her her grandchild Radha   [phone]    Called granddaughter. Per Radha, the patient has the same symptoms as when she left the hospital. She clarifies that indeed Junaid is not currently walking.   She asks for a provider to come to their house. Writer reports that unfortunately it is not an option. She goes on to say that we can call her an ambulance.   Will discuss with providers and will call Radha back for next steps              "

## 2022-03-29 NOTE — TELEPHONE ENCOUNTER
Per Julia Skinner CNP -     If the patient is severe enough to need an ambulance/stretcher ride to a clinic appt, the patient should be seen in the ER for evaluation.     Called Radha using [facility] interpretor services -     Attempted to reach out to Radha (patient granddaughter), no answer. Left voice message stating that per Julia Skinner CNP's recommendations, if the patient is requiring a stretcher/ambulance to get into a clinic appt, she needs to get into the ER for evaluation. Left call back number for patient or Radha to call clinic back to further discuss if needed.

## 2022-04-25 ENCOUNTER — TELEPHONE (OUTPATIENT)
Dept: FAMILY MEDICINE | Facility: CLINIC | Age: 72
End: 2022-04-25
Payer: COMMERCIAL

## 2022-04-26 NOTE — TELEPHONE ENCOUNTER
I have not seen this patient after 3/2020 at [facility]. Please schedule for earliest available Slot for hospital follow up as PT asking to cosign her notes and I will be able to unless we see her.     OK to use same day slot.     Thank you,  Sherlyn Orozco MD on 4/25/2022

## 2023-08-16 NOTE — DISCHARGE INSTRUCTIONS
Please make an appointment to follow up with Your Primary Care Provider as soon as possible for further work up of your anemia and to discuss a referral to physical therapy.    Take Tylenol, as needed, for mild pain.  Take 400 mg ibuprofen every 8 hours as needed, for moderate to severe pain and inflammation.    Take this medication with food to prevent stomach upset.  Drink plenty of fluid to protect your kidneys. If you taking a chronic antacid medication, make sure to take this while you are taking this medication. You should not take this medication if you have a history of GI bleeding, ulcers, or stomach surgery or have kidney failure.     Return to the ED for worsening pain, weakness, headache, vision changes, confusion, numbness, joint swelling, or fever.       16-Aug-2023 20:30

## 2024-03-06 ENCOUNTER — ANCILLARY PROCEDURE (OUTPATIENT)
Dept: ULTRASOUND IMAGING | Facility: CLINIC | Age: 74
End: 2024-03-06
Attending: INTERNAL MEDICINE
Payer: COMMERCIAL

## 2024-03-06 DIAGNOSIS — D53.9 NUTRITIONAL ANEMIA, UNSPECIFIED: ICD-10-CM

## 2024-03-06 DIAGNOSIS — B18.2 CHRONIC HEPATITIS C WITH HEPATIC COMA (H): ICD-10-CM

## 2024-03-06 PROCEDURE — 76705 ECHO EXAM OF ABDOMEN: CPT | Performed by: RADIOLOGY

## 2024-09-29 NOTE — ED TRIAGE NOTES
Presents with complaint of indigestion and vomiting. Was seen ER yesterday for same complaint. Was discharged and given prescriptions, but did not fill them.  
Yes